# Patient Record
Sex: FEMALE | Race: WHITE | NOT HISPANIC OR LATINO | Employment: OTHER | URBAN - METROPOLITAN AREA
[De-identification: names, ages, dates, MRNs, and addresses within clinical notes are randomized per-mention and may not be internally consistent; named-entity substitution may affect disease eponyms.]

---

## 2022-11-01 ENCOUNTER — HOSPITAL ENCOUNTER (INPATIENT)
Facility: HOSPITAL | Age: 62
LOS: 5 days | Discharge: HOME | End: 2022-11-08
Attending: EMERGENCY MEDICINE | Admitting: INTERNAL MEDICINE

## 2022-11-01 ENCOUNTER — APPOINTMENT (OUTPATIENT)
Dept: CT IMAGING | Facility: HOSPITAL | Age: 62
End: 2022-11-01

## 2022-11-01 DIAGNOSIS — J18.0 BRONCHOPNEUMONIA: ICD-10-CM

## 2022-11-01 DIAGNOSIS — N17.9 ACUTE KIDNEY INJURY: Primary | ICD-10-CM

## 2022-11-01 PROBLEM — I48.91 ATRIAL FIBRILLATION: Status: ACTIVE | Noted: 2022-11-01

## 2022-11-01 PROBLEM — J18.9 PNEUMONIA: Status: ACTIVE | Noted: 2022-11-01

## 2022-11-01 LAB
ALBUMIN SERPL-MCNC: 4.1 G/DL (ref 3.5–5.2)
ALBUMIN/GLOB SERPL: 1.1 G/DL
ALP SERPL-CCNC: 92 U/L (ref 39–117)
ALT SERPL W P-5'-P-CCNC: 12 U/L (ref 1–33)
AMORPH URATE CRY URNS QL MICRO: ABNORMAL /HPF
ANION GAP SERPL CALCULATED.3IONS-SCNC: 21 MMOL/L (ref 5–15)
AST SERPL-CCNC: 21 U/L (ref 1–32)
BACTERIA UR QL AUTO: ABNORMAL /HPF
BASOPHILS # BLD AUTO: 0 10*3/MM3 (ref 0–0.2)
BASOPHILS NFR BLD AUTO: 0.2 % (ref 0–1.5)
BILIRUB SERPL-MCNC: 0.4 MG/DL (ref 0–1.2)
BILIRUB UR QL STRIP: ABNORMAL
BUN SERPL-MCNC: 50 MG/DL (ref 8–23)
BUN/CREAT SERPL: 18.1 (ref 7–25)
CALCIUM SPEC-SCNC: 9 MG/DL (ref 8.6–10.5)
CHLORIDE SERPL-SCNC: 103 MMOL/L (ref 98–107)
CLARITY UR: ABNORMAL
CO2 SERPL-SCNC: 17 MMOL/L (ref 22–29)
COLOR UR: ABNORMAL
CREAT SERPL-MCNC: 2.77 MG/DL (ref 0.57–1)
D DIMER PPP FEU-MCNC: 1.09 MG/L (FEU) (ref 0–0.59)
D-LACTATE SERPL-SCNC: 1.1 MMOL/L (ref 0.5–2)
DEPRECATED RDW RBC AUTO: 43.3 FL (ref 37–54)
EGFRCR SERPLBLD CKD-EPI 2021: 18.6 ML/MIN/1.73
EOSINOPHIL # BLD AUTO: 0.1 10*3/MM3 (ref 0–0.4)
EOSINOPHIL NFR BLD AUTO: 1.2 % (ref 0.3–6.2)
ERYTHROCYTE [DISTWIDTH] IN BLOOD BY AUTOMATED COUNT: 13.5 % (ref 12.3–15.4)
GLOBULIN UR ELPH-MCNC: 3.8 GM/DL
GLUCOSE SERPL-MCNC: 116 MG/DL (ref 65–99)
GLUCOSE UR STRIP-MCNC: NEGATIVE MG/DL
HCT VFR BLD AUTO: 34.3 % (ref 34–46.6)
HGB BLD-MCNC: 11.5 G/DL (ref 12–15.9)
HGB UR QL STRIP.AUTO: NEGATIVE
HOLD SPECIMEN: NORMAL
HYALINE CASTS UR QL AUTO: ABNORMAL /LPF
KETONES UR QL STRIP: ABNORMAL
L PNEUMO1 AG UR QL IA: NEGATIVE
LEUKOCYTE ESTERASE UR QL STRIP.AUTO: ABNORMAL
LIPASE SERPL-CCNC: 42 U/L (ref 13–60)
LYMPHOCYTES # BLD AUTO: 2.7 10*3/MM3 (ref 0.7–3.1)
LYMPHOCYTES NFR BLD AUTO: 31.9 % (ref 19.6–45.3)
MCH RBC QN AUTO: 29.2 PG (ref 26.6–33)
MCHC RBC AUTO-ENTMCNC: 33.6 G/DL (ref 31.5–35.7)
MCV RBC AUTO: 86.7 FL (ref 79–97)
MONOCYTES # BLD AUTO: 0.9 10*3/MM3 (ref 0.1–0.9)
MONOCYTES NFR BLD AUTO: 10.5 % (ref 5–12)
NEUTROPHILS NFR BLD AUTO: 4.7 10*3/MM3 (ref 1.7–7)
NEUTROPHILS NFR BLD AUTO: 56.2 % (ref 42.7–76)
NITRITE UR QL STRIP: NEGATIVE
NRBC BLD AUTO-RTO: 0.1 /100 WBC (ref 0–0.2)
NT-PROBNP SERPL-MCNC: 1217 PG/ML (ref 0–900)
PH UR STRIP.AUTO: <=5 [PH] (ref 5–8)
PLATELET # BLD AUTO: 258 10*3/MM3 (ref 140–450)
PMV BLD AUTO: 7.5 FL (ref 6–12)
POTASSIUM SERPL-SCNC: 3.6 MMOL/L (ref 3.5–5.2)
PROCALCITONIN SERPL-MCNC: 0.17 NG/ML (ref 0–0.25)
PROT SERPL-MCNC: 7.9 G/DL (ref 6–8.5)
PROT UR QL STRIP: ABNORMAL
QT INTERVAL: 442 MS
RBC # BLD AUTO: 3.95 10*6/MM3 (ref 3.77–5.28)
RBC # UR STRIP: ABNORMAL /HPF
REF LAB TEST METHOD: ABNORMAL
S PNEUM AG SPEC QL LA: NEGATIVE
SODIUM SERPL-SCNC: 141 MMOL/L (ref 136–145)
SP GR UR STRIP: 1.02 (ref 1–1.03)
SQUAMOUS #/AREA URNS HPF: ABNORMAL /HPF
TROPONIN T SERPL-MCNC: <0.01 NG/ML (ref 0–0.03)
UROBILINOGEN UR QL STRIP: ABNORMAL
WBC # UR STRIP: ABNORMAL /HPF
WBC NRBC COR # BLD: 8.3 10*3/MM3 (ref 3.4–10.8)

## 2022-11-01 PROCEDURE — 84145 PROCALCITONIN (PCT): CPT | Performed by: EMERGENCY MEDICINE

## 2022-11-01 PROCEDURE — P9612 CATHETERIZE FOR URINE SPEC: HCPCS

## 2022-11-01 PROCEDURE — 81001 URINALYSIS AUTO W/SCOPE: CPT | Performed by: EMERGENCY MEDICINE

## 2022-11-01 PROCEDURE — 94640 AIRWAY INHALATION TREATMENT: CPT

## 2022-11-01 PROCEDURE — 87449 NOS EACH ORGANISM AG IA: CPT | Performed by: PHYSICIAN ASSISTANT

## 2022-11-01 PROCEDURE — 80053 COMPREHEN METABOLIC PANEL: CPT | Performed by: EMERGENCY MEDICINE

## 2022-11-01 PROCEDURE — 25010000002 MORPHINE PER 10 MG: Performed by: INTERNAL MEDICINE

## 2022-11-01 PROCEDURE — G0378 HOSPITAL OBSERVATION PER HR: HCPCS

## 2022-11-01 PROCEDURE — 25010000002 VANCOMYCIN 10 G RECONSTITUTED SOLUTION: Performed by: EMERGENCY MEDICINE

## 2022-11-01 PROCEDURE — 83690 ASSAY OF LIPASE: CPT | Performed by: EMERGENCY MEDICINE

## 2022-11-01 PROCEDURE — 71250 CT THORAX DX C-: CPT

## 2022-11-01 PROCEDURE — 36415 COLL VENOUS BLD VENIPUNCTURE: CPT

## 2022-11-01 PROCEDURE — 83605 ASSAY OF LACTIC ACID: CPT

## 2022-11-01 PROCEDURE — 85025 COMPLETE CBC W/AUTO DIFF WBC: CPT | Performed by: EMERGENCY MEDICINE

## 2022-11-01 PROCEDURE — 87899 AGENT NOS ASSAY W/OPTIC: CPT | Performed by: PHYSICIAN ASSISTANT

## 2022-11-01 PROCEDURE — 83880 ASSAY OF NATRIURETIC PEPTIDE: CPT | Performed by: EMERGENCY MEDICINE

## 2022-11-01 PROCEDURE — 84484 ASSAY OF TROPONIN QUANT: CPT | Performed by: EMERGENCY MEDICINE

## 2022-11-01 PROCEDURE — 87040 BLOOD CULTURE FOR BACTERIA: CPT | Performed by: EMERGENCY MEDICINE

## 2022-11-01 PROCEDURE — 93005 ELECTROCARDIOGRAM TRACING: CPT

## 2022-11-01 PROCEDURE — 99285 EMERGENCY DEPT VISIT HI MDM: CPT

## 2022-11-01 PROCEDURE — 25010000002 ONDANSETRON PER 1 MG: Performed by: PHYSICIAN ASSISTANT

## 2022-11-01 PROCEDURE — 25010000002 FENTANYL CITRATE (PF) 50 MCG/ML SOLUTION: Performed by: EMERGENCY MEDICINE

## 2022-11-01 PROCEDURE — 94799 UNLISTED PULMONARY SVC/PX: CPT

## 2022-11-01 PROCEDURE — 93005 ELECTROCARDIOGRAM TRACING: CPT | Performed by: EMERGENCY MEDICINE

## 2022-11-01 PROCEDURE — 85379 FIBRIN DEGRADATION QUANT: CPT | Performed by: EMERGENCY MEDICINE

## 2022-11-01 PROCEDURE — 25010000002 CEFEPIME PER 500 MG: Performed by: EMERGENCY MEDICINE

## 2022-11-01 RX ORDER — SODIUM CHLORIDE 0.9 % (FLUSH) 0.9 %
10 SYRINGE (ML) INJECTION EVERY 12 HOURS SCHEDULED
Status: DISCONTINUED | OUTPATIENT
Start: 2022-11-01 | End: 2022-11-08 | Stop reason: HOSPADM

## 2022-11-01 RX ORDER — SODIUM CHLORIDE 0.9 % (FLUSH) 0.9 %
10 SYRINGE (ML) INJECTION AS NEEDED
Status: DISCONTINUED | OUTPATIENT
Start: 2022-11-01 | End: 2022-11-08 | Stop reason: HOSPADM

## 2022-11-01 RX ORDER — MAGNESIUM SULFATE 1 G/100ML
1 INJECTION INTRAVENOUS AS NEEDED
Status: DISCONTINUED | OUTPATIENT
Start: 2022-11-01 | End: 2022-11-08 | Stop reason: HOSPADM

## 2022-11-01 RX ORDER — HYDROXYZINE HYDROCHLORIDE 25 MG/1
25 TABLET, FILM COATED ORAL 3 TIMES DAILY PRN
COMMUNITY

## 2022-11-01 RX ORDER — FLUTICASONE PROPIONATE 50 MCG
1 SPRAY, SUSPENSION (ML) NASAL DAILY
COMMUNITY

## 2022-11-01 RX ORDER — POTASSIUM CHLORIDE 1.5 G/1.77G
40 POWDER, FOR SOLUTION ORAL AS NEEDED
Status: DISCONTINUED | OUTPATIENT
Start: 2022-11-01 | End: 2022-11-08 | Stop reason: HOSPADM

## 2022-11-01 RX ORDER — ONDANSETRON 4 MG/1
4 TABLET, FILM COATED ORAL EVERY 6 HOURS PRN
Status: DISCONTINUED | OUTPATIENT
Start: 2022-11-01 | End: 2022-11-08 | Stop reason: HOSPADM

## 2022-11-01 RX ORDER — MULTIPLE VITAMINS W/ MINERALS TAB 9MG-400MCG
1 TAB ORAL DAILY
COMMUNITY

## 2022-11-01 RX ORDER — ACETAMINOPHEN 160 MG/5ML
650 SOLUTION ORAL EVERY 4 HOURS PRN
Status: DISCONTINUED | OUTPATIENT
Start: 2022-11-01 | End: 2022-11-08 | Stop reason: HOSPADM

## 2022-11-01 RX ORDER — LISINOPRIL 20 MG/1
20 TABLET ORAL 2 TIMES DAILY
Status: ON HOLD | COMMUNITY
End: 2022-12-02 | Stop reason: SDUPTHER

## 2022-11-01 RX ORDER — BISACODYL 10 MG
10 SUPPOSITORY, RECTAL RECTAL DAILY PRN
Status: DISCONTINUED | OUTPATIENT
Start: 2022-11-01 | End: 2022-11-08 | Stop reason: HOSPADM

## 2022-11-01 RX ORDER — ACETAMINOPHEN 325 MG/1
650 TABLET ORAL EVERY 4 HOURS PRN
Status: DISCONTINUED | OUTPATIENT
Start: 2022-11-01 | End: 2022-11-08 | Stop reason: HOSPADM

## 2022-11-01 RX ORDER — METOPROLOL TARTRATE 50 MG/1
75 TABLET, FILM COATED ORAL 2 TIMES DAILY
COMMUNITY
End: 2022-11-08 | Stop reason: HOSPADM

## 2022-11-01 RX ORDER — POTASSIUM CHLORIDE 20 MEQ/1
40 TABLET, EXTENDED RELEASE ORAL AS NEEDED
Status: DISCONTINUED | OUTPATIENT
Start: 2022-11-01 | End: 2022-11-08 | Stop reason: HOSPADM

## 2022-11-01 RX ORDER — MAGNESIUM SULFATE HEPTAHYDRATE 40 MG/ML
2 INJECTION, SOLUTION INTRAVENOUS AS NEEDED
Status: DISCONTINUED | OUTPATIENT
Start: 2022-11-01 | End: 2022-11-08 | Stop reason: HOSPADM

## 2022-11-01 RX ORDER — BISACODYL 5 MG/1
5 TABLET, DELAYED RELEASE ORAL DAILY PRN
Status: DISCONTINUED | OUTPATIENT
Start: 2022-11-01 | End: 2022-11-08 | Stop reason: HOSPADM

## 2022-11-01 RX ORDER — SODIUM CHLORIDE 9 MG/ML
125 INJECTION, SOLUTION INTRAVENOUS CONTINUOUS
Status: DISCONTINUED | OUTPATIENT
Start: 2022-11-01 | End: 2022-11-02

## 2022-11-01 RX ORDER — HYDROXYZINE HYDROCHLORIDE 25 MG/1
25 TABLET, FILM COATED ORAL 3 TIMES DAILY PRN
Status: DISCONTINUED | OUTPATIENT
Start: 2022-11-01 | End: 2022-11-08 | Stop reason: HOSPADM

## 2022-11-01 RX ORDER — BUSPIRONE HYDROCHLORIDE 15 MG/1
15 TABLET ORAL 3 TIMES DAILY
Status: DISCONTINUED | OUTPATIENT
Start: 2022-11-01 | End: 2022-11-08 | Stop reason: HOSPADM

## 2022-11-01 RX ORDER — ONDANSETRON 2 MG/ML
4 INJECTION INTRAMUSCULAR; INTRAVENOUS EVERY 6 HOURS PRN
Status: DISCONTINUED | OUTPATIENT
Start: 2022-11-01 | End: 2022-11-08 | Stop reason: HOSPADM

## 2022-11-01 RX ORDER — HYDROCHLOROTHIAZIDE 25 MG/1
25 TABLET ORAL DAILY
COMMUNITY
End: 2022-12-04 | Stop reason: HOSPADM

## 2022-11-01 RX ORDER — SODIUM CHLORIDE 9 MG/ML
75 INJECTION, SOLUTION INTRAVENOUS CONTINUOUS
Status: DISCONTINUED | OUTPATIENT
Start: 2022-11-01 | End: 2022-11-03

## 2022-11-01 RX ORDER — ATORVASTATIN CALCIUM 40 MG/1
40 TABLET, FILM COATED ORAL NIGHTLY
Status: DISCONTINUED | OUTPATIENT
Start: 2022-11-01 | End: 2022-11-08 | Stop reason: HOSPADM

## 2022-11-01 RX ORDER — ACETAMINOPHEN 650 MG/1
650 SUPPOSITORY RECTAL EVERY 4 HOURS PRN
Status: DISCONTINUED | OUTPATIENT
Start: 2022-11-01 | End: 2022-11-08 | Stop reason: HOSPADM

## 2022-11-01 RX ORDER — SUMATRIPTAN 50 MG/1
50 TABLET, FILM COATED ORAL
COMMUNITY

## 2022-11-01 RX ORDER — NITROGLYCERIN 0.4 MG/1
0.4 TABLET SUBLINGUAL
Status: DISCONTINUED | OUTPATIENT
Start: 2022-11-01 | End: 2022-11-08 | Stop reason: HOSPADM

## 2022-11-01 RX ORDER — ESCITALOPRAM OXALATE 20 MG/1
20 TABLET ORAL DAILY
COMMUNITY

## 2022-11-01 RX ORDER — TOPIRAMATE 25 MG/1
50 TABLET ORAL 2 TIMES DAILY
Status: DISCONTINUED | OUTPATIENT
Start: 2022-11-01 | End: 2022-11-08 | Stop reason: HOSPADM

## 2022-11-01 RX ORDER — POLYETHYLENE GLYCOL 3350 17 G/17G
17 POWDER, FOR SOLUTION ORAL DAILY PRN
Status: DISCONTINUED | OUTPATIENT
Start: 2022-11-01 | End: 2022-11-08 | Stop reason: HOSPADM

## 2022-11-01 RX ORDER — IPRATROPIUM BROMIDE AND ALBUTEROL SULFATE 2.5; .5 MG/3ML; MG/3ML
3 SOLUTION RESPIRATORY (INHALATION) EVERY 4 HOURS PRN
Status: DISCONTINUED | OUTPATIENT
Start: 2022-11-01 | End: 2022-11-08 | Stop reason: HOSPADM

## 2022-11-01 RX ORDER — ESCITALOPRAM OXALATE 10 MG/1
20 TABLET ORAL DAILY
Status: DISCONTINUED | OUTPATIENT
Start: 2022-11-02 | End: 2022-11-08 | Stop reason: HOSPADM

## 2022-11-01 RX ORDER — ATORVASTATIN CALCIUM 40 MG/1
40 TABLET, FILM COATED ORAL DAILY
COMMUNITY

## 2022-11-01 RX ORDER — TRAZODONE HYDROCHLORIDE 50 MG/1
50 TABLET ORAL NIGHTLY PRN
COMMUNITY

## 2022-11-01 RX ORDER — CHOLECALCIFEROL (VITAMIN D3) 125 MCG
5 CAPSULE ORAL NIGHTLY PRN
Status: DISCONTINUED | OUTPATIENT
Start: 2022-11-01 | End: 2022-11-08 | Stop reason: HOSPADM

## 2022-11-01 RX ORDER — SUMATRIPTAN 50 MG/1
50 TABLET, FILM COATED ORAL
Status: DISCONTINUED | OUTPATIENT
Start: 2022-11-01 | End: 2022-11-08 | Stop reason: HOSPADM

## 2022-11-01 RX ORDER — ALUMINA, MAGNESIA, AND SIMETHICONE 2400; 2400; 240 MG/30ML; MG/30ML; MG/30ML
15 SUSPENSION ORAL EVERY 6 HOURS PRN
Status: DISCONTINUED | OUTPATIENT
Start: 2022-11-01 | End: 2022-11-08 | Stop reason: HOSPADM

## 2022-11-01 RX ORDER — AMOXICILLIN 250 MG
2 CAPSULE ORAL 2 TIMES DAILY
Status: DISCONTINUED | OUTPATIENT
Start: 2022-11-01 | End: 2022-11-08 | Stop reason: HOSPADM

## 2022-11-01 RX ORDER — FENTANYL CITRATE 50 UG/ML
50 INJECTION, SOLUTION INTRAMUSCULAR; INTRAVENOUS ONCE
Status: COMPLETED | OUTPATIENT
Start: 2022-11-01 | End: 2022-11-01

## 2022-11-01 RX ORDER — BUSPIRONE HYDROCHLORIDE 15 MG/1
15 TABLET ORAL 3 TIMES DAILY
COMMUNITY

## 2022-11-01 RX ORDER — TOPIRAMATE 50 MG/1
50 TABLET, FILM COATED ORAL 2 TIMES DAILY
COMMUNITY

## 2022-11-01 RX ADMIN — ATORVASTATIN CALCIUM 40 MG: 40 TABLET, FILM COATED ORAL at 21:46

## 2022-11-01 RX ADMIN — FENTANYL CITRATE 50 MCG: 50 INJECTION, SOLUTION INTRAMUSCULAR; INTRAVENOUS at 12:10

## 2022-11-01 RX ADMIN — ONDANSETRON 4 MG: 2 INJECTION INTRAMUSCULAR; INTRAVENOUS at 15:12

## 2022-11-01 RX ADMIN — SODIUM CHLORIDE 1000 ML: 9 INJECTION, SOLUTION INTRAVENOUS at 10:25

## 2022-11-01 RX ADMIN — MORPHINE SULFATE 4 MG: 4 INJECTION, SOLUTION INTRAMUSCULAR; INTRAVENOUS at 21:46

## 2022-11-01 RX ADMIN — BUSPIRONE HYDROCHLORIDE 15 MG: 15 TABLET ORAL at 21:46

## 2022-11-01 RX ADMIN — MORPHINE SULFATE 4 MG: 4 INJECTION, SOLUTION INTRAMUSCULAR; INTRAVENOUS at 15:12

## 2022-11-01 RX ADMIN — SODIUM CHLORIDE 75 ML/HR: 9 INJECTION, SOLUTION INTRAVENOUS at 18:04

## 2022-11-01 RX ADMIN — TOPIRAMATE 50 MG: 25 TABLET, FILM COATED ORAL at 21:45

## 2022-11-01 RX ADMIN — Medication 10 ML: at 21:46

## 2022-11-01 RX ADMIN — CEFEPIME 2 G: 2 INJECTION, POWDER, FOR SOLUTION INTRAVENOUS at 10:27

## 2022-11-01 RX ADMIN — SODIUM CHLORIDE, POTASSIUM CHLORIDE, SODIUM LACTATE AND CALCIUM CHLORIDE 1000 ML: 600; 310; 30; 20 INJECTION, SOLUTION INTRAVENOUS at 11:30

## 2022-11-01 RX ADMIN — SENNOSIDES AND DOCUSATE SODIUM 2 TABLET: 50; 8.6 TABLET ORAL at 21:45

## 2022-11-01 RX ADMIN — SODIUM CHLORIDE 125 ML/HR: 9 INJECTION, SOLUTION INTRAVENOUS at 18:42

## 2022-11-01 RX ADMIN — VANCOMYCIN HYDROCHLORIDE 1500 MG: 10 INJECTION, POWDER, LYOPHILIZED, FOR SOLUTION INTRAVENOUS at 10:28

## 2022-11-01 RX ADMIN — SODIUM CHLORIDE 1000 ML: 9 INJECTION, SOLUTION INTRAVENOUS at 18:42

## 2022-11-01 RX ADMIN — IPRATROPIUM BROMIDE AND ALBUTEROL SULFATE 3 ML: .5; 3 SOLUTION RESPIRATORY (INHALATION) at 17:07

## 2022-11-01 RX ADMIN — ACETAMINOPHEN 650 MG: 325 TABLET, FILM COATED ORAL at 17:02

## 2022-11-01 NOTE — ED PROVIDER NOTES
Subjective   History of Present Illness  History Provided By: Patient    Chief Complaint: Cough, shortness of breath going on for 3 days, also has not had any urine output for slightly less than 24 hours.  Onset: 3 days ago  Timing: Worsening  Location: Chest  Quality: Productive cough, shortness of breath  Severity: Moderate  Modifying Factors: None    Other: Found to be very hypotensive in triage.  71/44.  Patient states she has a history of A. fib.  Has been nauseous but no vomiting.  No fever.    Review of Systems   Constitutional: Negative for chills and fever.   Respiratory: Positive for cough, shortness of breath and wheezing.    Cardiovascular: Negative for chest pain.   Gastrointestinal: Negative for abdominal pain, diarrhea and vomiting.   All other systems reviewed and are negative.      Past Medical History:   Diagnosis Date   • Atrial fibrillation (HCC)    • Atrial fibrillation (HCC)    • Depression    • Hypertension    • Migraine        Allergies   Allergen Reactions   • Penicillins Shortness Of Breath       Past Surgical History:   Procedure Laterality Date   • THORACOTOMY Left     for TB       Family History   Problem Relation Age of Onset   • No Known Problems Mother    • No Known Problems Father        Social History     Socioeconomic History   • Marital status:    Tobacco Use   • Smoking status: Never   • Smokeless tobacco: Never   Vaping Use   • Vaping Use: Never used           Objective   Physical Exam  Constitutional:  No acute distress.  Head:  Atraumatic.  Normocephalic.   Eyes:  No scleral icterus. Normal conjunctiva  ENT:  Moist mucosa.  No nasal discharge present.  Cardiovascular:  Well perfused.  Equal pulses.  Regular rhythm and normal rate.  Normal capillary refill.    Pulmonary/Chest: Tachypneic.  Speaking in mildly shortened sentences.  Diminished breath sounds bilaterally.  Abdominal:  Non-distended. Non-tender.   Extremities:  No peripheral edema.  No Deformity  Skin:  Warm,  "dry  Neurological:  Alert, awake, and appropriate.  Normal speech.      Procedures           ED Course      /53   Pulse 64   Temp 98.6 °F (37 °C) (Oral)   Resp 16   Ht 162.6 cm (64\")   Wt 74.8 kg (165 lb)   SpO2 99%   BMI 28.32 kg/m²   Labs Reviewed   COMPREHENSIVE METABOLIC PANEL - Abnormal; Notable for the following components:       Result Value    Glucose 116 (*)     BUN 50 (*)     Creatinine 2.77 (*)     CO2 17.0 (*)     Anion Gap 21.0 (*)     eGFR 18.6 (*)     All other components within normal limits    Narrative:     GFR Normal >60  Chronic Kidney Disease <60  Kidney Failure <15     D-DIMER, QUANTITATIVE - Abnormal; Notable for the following components:    D-Dimer, Quantitative 1.09 (*)     All other components within normal limits    Narrative:     Reference Range  --------------------------------------------------------------------     < 0.50   Negative Predictive Value  0.50-0.59   Indeterminate    >= 0.60   Probable VTE             A very low percentage of patients with DVT may yield D-Dimer results   below the cut-off of 0.50 mg/L FEU.  This is known to be more   prevalent in patients with distal DVT.             Results of this test should always be interpreted in conjunction with   the patient's medical history, clinical presentation and other   findings.  Clinical diagnosis should not be based on the result of   [place] D-Dimer alone.   BNP (IN-HOUSE) - Abnormal; Notable for the following components:    proBNP 1,217.0 (*)     All other components within normal limits    Narrative:     Among patients with dyspnea, NT-proBNP is highly sensitive for the detection of acute congestive heart failure. In addition NT-proBNP of <300 pg/ml effectively rules out acute congestive heart failure with 99% negative predictive value.    Results may be falsely decreased if patient taking Biotin.     CBC WITH AUTO DIFFERENTIAL - Abnormal; Notable for the following components:    Hemoglobin 11.5 (*)     " "All other components within normal limits   URINALYSIS W/ MICROSCOPIC IF INDICATED (NO CULTURE) - Abnormal; Notable for the following components:    Color, UA Dark Yellow (*)     Appearance, UA Cloudy (*)     Ketones, UA Trace (*)     Bilirubin, UA Small (1+) (*)     Protein, UA 30 mg/dL (1+) (*)     Leuk Esterase, UA Trace (*)     All other components within normal limits   URINALYSIS, MICROSCOPIC ONLY - Abnormal; Notable for the following components:    RBC, UA 0-2 (*)     WBC, UA 0-2 (*)     All other components within normal limits   LEGIONELLA ANTIGEN, URINE - Normal   STREP PNEUMO AG, URINE OR CSF - Normal   LIPASE - Normal   TROPONIN (IN-HOUSE) - Normal    Narrative:     Troponin T Reference Range:  <= 0.03 ng/mL-   Negative for AMI  >0.03 ng/mL-     Abnormal for myocardial necrosis.  Clinicians would have to utilize clinical acumen, EKG, Troponin and serial changes to determine if it is an Acute Myocardial Infarction or myocardial injury due to an underlying chronic condition.       Results may be falsely decreased if patient taking Biotin.     PROCALCITONIN - Normal    Narrative:     As a Marker for Sepsis (Non-Neonates):    1. <0.5 ng/mL represents a low risk of severe sepsis and/or septic shock.  2. >2 ng/mL represents a high risk of severe sepsis and/or septic shock.    As a Marker for Lower Respiratory Tract Infections that require antibiotic therapy:    PCT on Admission    Antibiotic Therapy       6-12 Hrs later    >0.5                Strongly Recommended  >0.25 - <0.5        Recommended   0.1 - 0.25          Discouraged              Remeasure/reassess PCT  <0.1                Strongly Discouraged     Remeasure/reassess PCT    As 28 day mortality risk marker: \"Change in Procalcitonin Result\" (>80% or <=80%) if Day 0 (or Day 1) and Day 4 values are available. Refer to http://www.[employer]-pct-calculator.com    Change in PCT <=80%  A decrease of PCT levels below or equal to 80% defines a positive change in " PCT test result representing a higher risk for 28-day all-cause mortality of patients diagnosed with severe sepsis for septic shock.    Change in PCT >80%  A decrease of PCT levels of more than 80% defines a negative change in PCT result representing a lower risk for 28-day all-cause mortality of patients diagnosed with severe sepsis or septic shock.      POC LACTATE - Normal   BLOOD CULTURE   BLOOD CULTURE   RESPIRATORY CULTURE   MRSA SCREEN, PCR   POC LACTATE   CBC AND DIFFERENTIAL    Narrative:     The following orders were created for panel order CBC & Differential.  Procedure                               Abnormality         Status                     ---------                               -----------         ------                     CBC Auto Differential[[id number]]        Abnormal            Final result                 Please view results for these tests on the individual orders.   EXTRA TUBES    Narrative:     The following orders were created for panel order Extra Tubes.  Procedure                               Abnormality         Status                     ---------                               -----------         ------                     Gold Top - SST[[id number]]                                   Final result                 Please view results for these tests on the individual orders.   GOLD TOP - SST     Medications   sodium chloride 0.9 % flush 10 mL (has no administration in time range)   nitroglycerin (NITROSTAT) SL tablet 0.4 mg (has no administration in time range)   sodium chloride 0.9 % flush 10 mL (has no administration in time range)   sodium chloride 0.9 % flush 10 mL (has no administration in time range)   acetaminophen (TYLENOL) tablet 650 mg (650 mg Oral Given 11/1/22 1702)     Or   acetaminophen (TYLENOL) 160 MG/5ML solution 650 mg ( Oral Not Given:  See Alt 11/1/22 1702)     Or   acetaminophen (TYLENOL) suppository 650 mg ( Rectal Not Given:  See Alt 11/1/22 1702)    aluminum-magnesium hydroxide-simethicone (MAALOX MAX) 400-400-40 MG/5ML suspension 15 mL (has no administration in time range)   sennosides-docusate (PERICOLACE) 8.6-50 MG per tablet 2 tablet (has no administration in time range)     And   polyethylene glycol (MIRALAX) packet 17 g (has no administration in time range)     And   bisacodyl (DULCOLAX) EC tablet 5 mg (has no administration in time range)     And   bisacodyl (DULCOLAX) suppository 10 mg (has no administration in time range)   ondansetron (ZOFRAN) tablet 4 mg ( Oral Not Given:  See Alt 11/1/22 1512)     Or   ondansetron (ZOFRAN) injection 4 mg (4 mg Intravenous Given 11/1/22 1512)   melatonin tablet 5 mg (has no administration in time range)   ipratropium-albuterol (DUO-NEB) nebulizer solution 3 mL (3 mL Nebulization Given 11/1/22 1707)   potassium chloride (K-DUR,KLOR-CON) CR tablet 40 mEq (has no administration in time range)   potassium chloride (KLOR-CON) packet 40 mEq (has no administration in time range)   Magnesium Sulfate 2 gram infusion - Mg less than or equal to 1.5 mg/dL (has no administration in time range)     Or   Magnesium Sulfate 1 gram infusion - Mg 1.6-1.9 mg/dL (has no administration in time range)   atorvastatin (LIPITOR) tablet 40 mg (has no administration in time range)   busPIRone (BUSPAR) tablet 15 mg (has no administration in time range)   escitalopram (LEXAPRO) tablet 20 mg (has no administration in time range)   hydrOXYzine (ATARAX) tablet 25 mg (has no administration in time range)   metoprolol tartrate (LOPRESSOR) tablet 75 mg (has no administration in time range)   SUMAtriptan (IMITREX) tablet 50 mg (has no administration in time range)   topiramate (TOPAMAX) tablet 50 mg (has no administration in time range)   morphine injection 4 mg (4 mg Intravenous Given 11/1/22 1512)   sodium chloride 0.9 % infusion (has no administration in time range)   Pharmacy to dose vancomycin (has no administration in time range)   sodium  chloride 0.9 % bolus 1,000 mL (0 mL Intravenous Stopped 11/1/22 1114)   vancomycin 1500 mg/500 mL 0.9% NS IVPB ([facility]) (0 mg Intravenous Stopped 11/1/22 1325)   cefepime 2 gm IVPB in 100 ml NS (MBP) (0 g Intravenous Stopped 11/1/22 1114)   lactated ringers bolus 1,000 mL (0 mL Intravenous Stopped 11/1/22 1321)   fentaNYL citrate (PF) (SUBLIMAZE) injection 50 mcg (50 mcg Intravenous Given 11/1/22 1210)     CT Chest Without Contrast Diagnostic    Result Date: 11/1/2022  1. Patchy groundglass opacities within the bilateral lower lobes, likely representing bronchopneumonia and/or bronchiolitis.    Electronically Signed By-Tee Rao MD On:11/1/2022 12:51 PM This report was finalized on [id number] by  Tee Rao MD.                                         MDM   Critical Care Time     The high probability of sudden, clinically significant deterioration in the patient's condition required the highest level of my preparedness to intervene urgently.  The services I provided to this patient were to treat and/or prevent clinically significant deterioration that could result in: Cardiovascular collapse and death. Services included the following: chart data review, reviewing nurses notes an/or old charts, documentation time, consultant collaboration regarding findings and treatment options, vital sign assessments and ordering, interpreting and reviewing diagnostic studies/lab test.  Aggregate critical care time was 35 minutes, which includes only time during which I was engaged in work directly related to the patient's care, as described above, whether at the bedside or elsewhere in the Emergency Department. It did not include time spent performing other reported procedures or the services of residents, students, nurses or physician assistants.        Patient very hypotensive upon arrival.  Patient with IRENE.  Bronchopneumonia.  Pressure responded quickly to fluids.  Given 2 L and now hypertensive.  Patient  nontoxic-appearing.  Will admit to hospitalist service.  Final diagnoses:   Acute kidney injury (HCC)   Bronchopneumonia       ED Disposition  ED Disposition     ED Disposition   Decision to Admit    Condition   --    Comment   Level of Care: Med/Surg [1]   Admitting Physician: TC IVEY [[id number]]   Attending Physician: TC IVEY [[id number]]               No follow-up provider specified.       Medication List      No changes were made to your prescriptions during this visit.          Wilmer Rodrigues MD  11/01/22 8822

## 2022-11-01 NOTE — CONSULTS
[facility] CONSULT NOTE    Referring Provider: Dr. Blake Walker  Reason for Consultation: Acute kidney injury    Chief complaint.  Shortness of breath, cough    History of present illness:   Patient is 62 years old female with history of hypertension, depression, paroxysmal atrial fibrillation, presented to the hospital with few days history of cough, shortness of breath, thick expectoration, nausea, decreased appetite and oral intake.  Patient did not have any abdominal pain, vomiting, diarrhea, constipation, hematuria or dysuria but had decreased urine output since yesterday.  Patient was hypotensive on presentation so received IV fluid bolus.  Her creatinine was 2.77 Mg/DL which prompted renal consult.  Patient denies any use of NSAIDs.  Patient is on metoprolol, lisinopril and hydrochlorothiazide for blood pressure control as per HPI    History  Past Medical History:   Diagnosis Date   • Atrial fibrillation (HCC)    • Atrial fibrillation (HCC)    • Depression    • Hypertension    • Migraine      Past Surgical History:   Procedure Laterality Date   • THORACOTOMY Left     for TB     Social History     Tobacco Use   • Smoking status: Never   • Smokeless tobacco: Never   Vaping Use   • Vaping Use: Never used     Family History   Problem Relation Age of Onset   • No Known Problems Mother    • No Known Problems Father        Review of Systems  ROS  As per HPI  Objective     Vital Signs  Temp:  [98.6 °F (37 °C)] 98.6 °F (37 °C)  Heart Rate:  [57-93] 64  Resp:  [12-26] 16  BP: ()/(42-99) 131/53    I/O this shift:  In: 2600 [IV Piggyback:2600]  Out: -   No intake/output data recorded.    Physical Exam:  Physical Exam    General Appearance: alert, oriented x 3, no acute distress   Skin: warm and dry  HEENT: oral mucosa dry, nonicteric sclera  Neck: supple, no JVD  Lungs: Few scattered wheezes  Heart: RRR, normal S1 and S2  Abdomen: soft, nontender, nondistended  : no palpable bladder  Extremities: no edema,  cyanosis or clubbing  Neuro: normal speech and mental status     Results Review:   I reviewed the patient's new clinical results.    Lab Results   Component Value Date    CALCIUM 9.0 11/01/2022     Results from last 7 days   Lab Units 11/01/22  1024   SODIUM mmol/L 141   POTASSIUM mmol/L 3.6   CHLORIDE mmol/L 103   CO2 mmol/L 17.0*   BUN mg/dL 50*   CREATININE mg/dL 2.77*   GLUCOSE mg/dL 116*   CALCIUM mg/dL 9.0   WBC 10*3/mm3 8.30   HEMOGLOBIN g/dL 11.5*   PLATELETS 10*3/mm3 258   ALT (SGPT) U/L 12   AST (SGOT) U/L 21     Lab Results   Component Value Date    TROPONINT <0.010 11/01/2022     Estimated Creatinine Clearance: 20.3 mL/min (A) (by C-G formula based on SCr of 2.77 mg/dL (H)).No results found for: URICACID    Brief Urine Lab Results  (Last result in the past 365 days)      Color   Clarity   Blood   Leuk Est   Nitrite   Protein   CREAT   Urine HCG        11/01/22 1040 Dark Yellow  Comment: Result checked   Cloudy  Comment: Result checked   Negative   Trace   Negative   30 mg/dL (1+)                 Prior to Admission medications    Medication Sig Start Date End Date Taking? Authorizing Provider   atorvastatin (LIPITOR) 40 MG tablet Take 1 tablet by mouth Daily.   Yes Danny Cervantes MD   busPIRone (BUSPAR) 15 MG tablet Take 1 tablet by mouth 3 (Three) Times a Day.   Yes Danny Cervantes MD   escitalopram (LEXAPRO) 20 MG tablet Take 1 tablet by mouth Daily.   Yes Danny Cervantes MD   fluticasone (FLONASE) 50 MCG/ACT nasal spray 1 spray into the nostril(s) as directed by provider Daily.   Yes Danny Cervantes MD   hydroCHLOROthiazide (HYDRODIURIL) 25 MG tablet Take 1 tablet by mouth Daily.   Yes Danny Cervantes MD   hydrOXYzine (ATARAX) 25 MG tablet Take 1 tablet by mouth 3 (Three) Times a Day As Needed for Itching.   Yes Danny Cervantes MD   lisinopril (PRINIVIL,ZESTRIL) 20 MG tablet Take 1 tablet by mouth 2 (Two) Times a Day.   Yes Danny Cervantes MD   metoprolol  tartrate (LOPRESSOR) 50 MG tablet Take 1.5 tablets by mouth 2 (Two) Times a Day.   Yes Danny Cervantes MD   multivitamin with minerals tablet tablet Take 1 tablet by mouth Daily.   Yes Danny Cervantes MD   SUMAtriptan (IMITREX) 50 MG tablet Take 1 tablet by mouth Every 2 (Two) Hours As Needed for Migraine. Take one tablet at onset of headache. May repeat dose one time in 2 hours if headache not relieved.   Yes Danny Cervantes MD   topiramate (TOPAMAX) 50 MG tablet Take 1 tablet by mouth 2 (Two) Times a Day.   Yes Danny Cervantes MD   traZODone (DESYREL) 50 MG tablet Take 1-2 tablets by mouth At Night As Needed for Sleep.   Yes Danny Cervantes MD       atorvastatin, 40 mg, Oral, Nightly  busPIRone, 15 mg, Oral, TID  [START ON 11/2/2022] escitalopram, 20 mg, Oral, Daily  [START ON 11/2/2022] metoprolol tartrate, 75 mg, Oral, BID  senna-docusate sodium, 2 tablet, Oral, BID  sodium chloride, 1,000 mL, Intravenous, Once  sodium chloride, 10 mL, Intravenous, Q12H  topiramate, 50 mg, Oral, BID      Pharmacy to dose vancomycin,   sodium chloride, 75 mL/hr, Last Rate: 75 mL/hr (11/01/22 1804)  sodium chloride, 125 mL/hr        Assessment & Plan       1.  Acute kidney injury  Patient had normal creatinine as baseline but increased to 2.77 Mg/DL.  Seems to be prerenal in etiology.  Patient clinically looks dry.  Electrolytes acceptable    2.  History of hypertension  Patient hypotensive on presentation,Secondary to dehydration or SIRS  Patient received IV fluid bolus on presentation.  Antihypertensives on hold    3.  Dyspnea/cough  Probably due to pneumonia.    4.  Metabolic acidosis  Increased anion gap, secondary to IRENE    Recs:  -Repeat IV fluid bolus and continue IV hydration  -Keep off antihypertensives  -Order urine sodium  -Surveillance labs    I discussed the patients findings and my recommendations with patient, family and nursing staff    Christiano Jose MD  11/01/22  18:27 EDT

## 2022-11-01 NOTE — H&P
[facility]      Patient Name: Andressa Marks  : 1960  MRN: [id number]  Primary Care Physician:  Provider, No Known  Date of admission: 2022      Subjective      Chief Complaint: SOA    History of Present Illness: Andressa Marks is a 62 y.o. female with a past medical history to include atrial fibrillation rate control, hypertension, depression, migraines who presented to [facility] on 2022 complaining of 3-day history of shortness of breath and low urine output for 24 hours.  Patient reports she is having a productive cough with brown/bloody sputum for 2 days.  She reports that she is short of breath with coughing.  She denies having any chest pain, nausea, vomiting.  She denies having any abdominal pain, diarrhea or constipation.  She reports decreased urine.  She denies fever and chills.  She denies lightheadedness and syncopal episodes.  She reports there is no provoking or alleviating factors.    Emergency department work-up vital signs upon arrival 71/44 she was given a fluid bolus which blood pressure improved to 150/99.  She was also given vancomycin in ED.  UA negative.  Glucose 116.  BUN 50.  Creatinine 2.77.  CO2 17.0.  Lipase 42.  Troponin <0.010.  D-dimer 1.09.  proBNP 1217.0.  Cultures pending.  Sputum culture pending.  Pro-Honorio 0.17.  Lactate 1.1.  White blood count 8.30.  Hemoglobin 11.5.      Review of Systems   Constitutional: Negative for chills and fever.   HENT: Negative for congestion.    Eyes: Negative.    Cardiovascular: Negative for chest pain and syncope.   Respiratory: Positive for cough, hemoptysis, shortness of breath and sputum production.    Skin: Negative.    Musculoskeletal: Positive for back pain.   Gastrointestinal: Negative for bloating, abdominal pain, constipation, diarrhea, nausea and vomiting.   Genitourinary: Negative.    Neurological: Negative.    Psychiatric/Behavioral: Positive for depression.           Personal History     Past Medical History:   Diagnosis Date   • Atrial fibrillation (HCC)    • Atrial fibrillation (HCC)    • Depression    • Hypertension    • Migraine        Past Surgical History:   Procedure Laterality Date   • THORACOTOMY Left     for TB       Family History: family history includes No Known Problems in her father and mother. Otherwise pertinent FHx was reviewed and not pertinent to current issue.    Social History:  reports that she has never smoked. She has never used smokeless tobacco.    Home Medications:  Prior to Admission Medications     Prescriptions Last Dose Informant Patient Reported? Taking?    atorvastatin (LIPITOR) 40 MG tablet   Yes Yes    Take 1 tablet by mouth Daily.    busPIRone (BUSPAR) 15 MG tablet   Yes Yes    Take 1 tablet by mouth 3 (Three) Times a Day.    escitalopram (LEXAPRO) 20 MG tablet   Yes Yes    Take 1 tablet by mouth Daily.    fluticasone (FLONASE) 50 MCG/ACT nasal spray   Yes Yes    1 spray into the nostril(s) as directed by provider Daily.    hydroCHLOROthiazide (HYDRODIURIL) 25 MG tablet   Yes Yes    Take 1 tablet by mouth Daily.    hydrOXYzine (ATARAX) 25 MG tablet   Yes Yes    Take 1 tablet by mouth 3 (Three) Times a Day As Needed for Itching.    lisinopril (PRINIVIL,ZESTRIL) 20 MG tablet   Yes Yes    Take 1 tablet by mouth 2 (Two) Times a Day.    metoprolol tartrate (LOPRESSOR) 50 MG tablet   Yes Yes    Take 1.5 tablets by mouth 2 (Two) Times a Day.    multivitamin with minerals tablet tablet   Yes Yes    Take 1 tablet by mouth Daily.    SUMAtriptan (IMITREX) 50 MG tablet   Yes Yes    Take 1 tablet by mouth Every 2 (Two) Hours As Needed for Migraine. Take one tablet at onset of headache. May repeat dose one time in 2 hours if headache not relieved.    topiramate (TOPAMAX) 50 MG tablet   Yes Yes    Take 1 tablet by mouth 2 (Two) Times a Day.    traZODone (DESYREL) 50 MG tablet   Yes Yes    Take 1-2 tablets by mouth At Night As Needed for Sleep.             Allergies:  Allergies   Allergen Reactions   • Penicillins Shortness Of Breath       Objective      Vitals:   Temp:  [98.6 °F (37 °C)] 98.6 °F (37 °C)  Heart Rate:  [57-93] 62  Resp:  [12-26] 12  BP: ()/(42-99) 118/46  Flow (L/min):  [2] 2    Physical Exam  Constitutional:       General: She is not in acute distress.     Appearance: She is obese.   HENT:      Head: Normocephalic and atraumatic.   Cardiovascular:      Rate and Rhythm: Normal rate and regular rhythm.      Pulses: Normal pulses.      Heart sounds: Normal heart sounds.   Pulmonary:      Effort: Pulmonary effort is normal.      Breath sounds: Wheezing present.   Abdominal:      General: Bowel sounds are normal.      Palpations: Abdomen is soft.      Tenderness: There is no abdominal tenderness.   Musculoskeletal:         General: Normal range of motion.      Cervical back: Normal range of motion and neck supple.   Skin:     General: Skin is warm and dry.   Neurological:      General: No focal deficit present.      Mental Status: She is alert and oriented to person, place, and time.            Result Review    Result Review:  I have personally reviewed the results from the time of this admission to 11/1/2022 16:55 EDT and agree with these findings:  [x]  Laboratory  [x]  Microbiology  [x]  Radiology  [x]  EKG/Telemetry   [x]  Cardiology/Vascular   []  Pathology  []  Old records  []  Other:        Assessment & Plan        Active Hospital Problems:  Active Hospital Problems    Diagnosis    • **Pneumonia    • Atrial fibrillation (HCC)      Plan:     Pneumonia   -CT chest without Patchy groundglass opacities within the bilateral lower lobes, likely  representing bronchopneumonia and/or bronchiolitis.  -White blood count 8.30  -Lactate 1.1  -Pro-Honorio 0.17  -Blood culture pending  -Sputum culture pending  -Urine antigen strep pneumo and Legionella negative  -Respiratory viral panel negative  -O2 and sats 2 L nasal cannula 100%.  -ABX: Vancomycin in  ED  -Nebs  -D-dimer 1.09  -proBNP 1277.0  -Monitor H&H      IRENE   -Current BUN 50  -Current creatinine 2.77  -Hold nephrotoxic medicines  -UA negative  -Fluids  -Patient with elevated D-dimer, hemoptysis, CTA PE protocol needed once okay with nephrology.  Patient on therapeutic heparin for prophylaxis    A-fib, stable   -Continue metoprolol    Hypertension  -Hold lisinopril and hydrochlorothiazide  -Current /46      Hyperlipidemia  -Continue statin therapy    Migraines  - Continue sumatriptan and Topamax    DVT prophylaxis:  No DVT prophylaxis order currently exists.  Therapeutic heparin has been ordered.  CODE STATUS:    Level Of Support Discussed With: Patient  Code Status (Patient has no pulse and is not breathing): CPR (Attempt to Resuscitate)  Medical Interventions (Patient has pulse or is breathing): Full Support    Admission Status:  I believe this patient meets inpatient status.    I discussed the patient's findings and my recommendations with patient.        Signature: Electronically signed by Kelli Curiel PA-C, 11/01/22, 4:46 PM EDT.

## 2022-11-01 NOTE — CASE MANAGEMENT/SOCIAL WORK
Discharge Planning Assessment   [facility]     Patient Name: Andressa Marks  MRN: [id number]  Today's Date: 11/1/2022    Admit Date: 11/1/2022    Plan: Home with daughter, watch for Oxygen needs and IV ATB   Discharge Needs Assessment     Row Name 11/01/22 1922       Living Environment    People in Home child(arin), adult    Current Living Arrangements home    Primary Care Provided by self    Able to Return to Prior Arrangements yes       Resource/Environmental Concerns    Resource/Environmental Concerns none    Transportation Concerns none       Transition Planning    Patient/Family Anticipates Transition to home with family    Patient/Family Anticipated Services at Transition none    Transportation Anticipated family or friend will provide       Discharge Needs Assessment    Readmission Within the Last 30 Days no previous admission in last 30 days    Equipment Currently Used at Home none    Concerns to be Addressed denies needs/concerns at this time    Anticipated Changes Related to Illness none    Equipment Needed After Discharge none               Discharge Plan     Row Name 11/01/22 1923       Plan    Plan Home with daughter, watch for Oxygen needs and IV ATB    Patient/Family in Agreement with Plan yes    Plan Comments Met with Patient at bedside Lives at home with Daughter PCP is Dr Barragan in [place] (not listed in [email or web address]) and Pharmacy verified, able to afford medications. Denies any needs at this time. D/C Barriers: New oxygen need, IV ATB, Renal consult              Continued Care and Services - Admitted Since 11/1/2022    Coordination has not been started for this encounter.       Expected Discharge Date and Time     Expected Discharge Date Expected Discharge Time    Nov 3, 2022          Demographic Summary     Row Name 11/01/22 1922       General Information    Admission Type observation    Arrived From emergency department    Referral Source admission list    Reason for Consult discharge planning     Preferred Language English               Functional Status     Row Name 11/01/22 1922       Functional Status    Usual Activity Tolerance good    Current Activity Tolerance good       Functional Status, IADL    Medications independent    Meal Preparation independent    Housekeeping independent    Laundry independent    Shopping independent       Mental Status    General Appearance WDL WDL       Mental Status Summary    Recent Changes in Mental Status/Cognitive Functioning no changes              Met with patient at bedside wearing mask and goggles, Spent less than 15 minutes in room at greater than 6 feet distance.           Rosalia Alfred RN

## 2022-11-01 NOTE — PROGRESS NOTES
"Pharmacy Antimicrobial Dosing Service    Subjective:  Andressa Marks is a 62 y.o.female admitted with PNA. Pharmacy has been consulted to dose Vancomycin for possible PNA.    PMH: elevated Scr, afib      Assessment/Plan    1. Day #1 Vancomycin: Pulse dosing d/t renal dysfxn. Vanc 1500mg (20 mg/kg ABW) given in ED. Will initiate pulse dosing at this time due to elevated SCr. Random level 0800 on 11/2. Will plan to re-dose when vancomycin level <20 mcg/mL.    2. Day #1 Cefepime: 2g IV q24h for estCrCl < 30 mL/min.    Will continue to monitor drug levels, renal function, culture and sensitivities, and patient clinical status.       Objective:  Relevant clinical data and objective history reviewed:  162.6 cm (64\")   74.8 kg (165 lb)   Ideal body weight: 54.7 kg (120 lb 9.5 oz)  Adjusted ideal body weight: 62.8 kg (138 lb 5.7 oz)  Body mass index is 28.32 kg/m².        Results from last 7 days   Lab Units 11/01/22  1024   CREATININE mg/dL 2.77*     Estimated Creatinine Clearance: 20.3 mL/min (A) (by C-G formula based on SCr of 2.77 mg/dL (H)).  I/O last 3 completed shifts:  In: 2600 [IV Piggyback:2600]  Out: -     Results from last 7 days   Lab Units 11/01/22  1024   WBC 10*3/mm3 8.30     Temperature    11/01/22 1007   Temp: 98.6 °F (37 °C)     Baseline culture/source/susceptibility:  Microbiology Results (last 10 days)       Procedure Component Value - Date/Time    Legionella Antigen, Urine - Urine, Urine, Clean Catch [[id number]]  (Normal) Collected: 11/01/22 1040    Lab Status: Final result Specimen: Urine, Clean Catch Updated: 11/01/22 1529     LEGIONELLA ANTIGEN, URINE Negative    S. Pneumo Ag Urine or CSF - Urine, Urine, Clean Catch [[id number]]  (Normal) Collected: 11/01/22 1040    Lab Status: Final result Specimen: Urine, Clean Catch Updated: 11/01/22 1529     Strep Pneumo Ag Negative            Anti-Infectives (From admission, onward)      Ordered     Dose/Rate Route Frequency Start Stop    11/01/22 1920  " Vancomycin Pharmacy Intermittent/Pulse Dosing        Ordering Provider: Kelli Curiel PA-C     Does not apply As Needed 11/01/22 1918 11/06/22 1917 11/01/22 1712  Pharmacy to dose vancomycin        Ordering Provider: Kelli Curiel PA-C     Does not apply Continuous PRN 11/01/22 1712 11/06/22 1711 11/01/22 1022  vancomycin 1500 mg/500 mL 0.9% NS IVPB ([facility])        Ordering Provider: Wilmer Rodrigues MD    20 mg/kg × 74.8 kg Intravenous Once 11/01/22 1024 11/01/22 1325    11/01/22 1022  cefepime 2 gm IVPB in 100 ml NS (MBP)        Ordering Provider: Wilmer Rodrigues MD    2 g  over 30 Minutes Intravenous Once 11/01/22 1024 11/01/22 1114            Jenny Nicolas [facility]  11/01/22 19:21 EDT

## 2022-11-01 NOTE — ED NOTES
Nursing report ED to floor  Andressa Marks  62 y.o.  female    HPI:   Chief Complaint   Patient presents with    Shortness of Breath     SOA,, Not eating or drinking, pt reports she has an abscess to right side has been sick for one week not getting any better.         Admitting doctor:   Blake Walker MD    Admitting diagnosis:   The primary encounter diagnosis was Acute kidney injury (HCC). A diagnosis of Bronchopneumonia was also pertinent to this visit.    Code status:   Current Code Status       Date Active Code Status Order ID Comments User Context       11/1/2022 1458 CPR (Attempt to Resuscitate) [id number]  Kelli Curiel, [facility] ED        Question Answer    Code Status (Patient has no pulse and is not breathing) CPR (Attempt to Resuscitate)    Medical Interventions (Patient has pulse or is breathing) Full Support    Level Of Support Discussed With Patient                    Allergies:   Penicillins    Isolation:  No active isolations     Fall Risk:  Fall Risk Assessment was completed, and patient is at high risk for falls.   Predictive Model Details         7 (Low) Factor Value    Calculated 11/1/2022 18:08 Age 62    Risk of Fall Model Musculoskeletal Assessment WDL     Active Peripheral IV Present     Imaging order in this encounter Present     Number of Distinct Medication Classes administered 7     Drug Use Not Asked     Diastolic BP 53     Skin Assessment WDL     Magnesium not on file     Creatinine 2.77 mg/dL     Kashif Scale not on file     Number of administrations of Analgesic Narcotics 2     Peripheral Vascular Assessment WDL     Calcium 9 mg/dL     Financial Class Medicaid     Gastrointestinal Assessment WDL     Cardiac Assessment WDL     Respiratory Rate 16     Albumin 4.1 g/dL     Days after Admission 0.329     Chloride 103 mmol/L     Total Bilirubin 0.4 mg/dL     ALT 12 U/L     Potassium 3.6 mmol/L         Weight:       11/01/22  1007   Weight: 74.8 kg (165 lb)       Intake and  Output    Intake/Output Summary (Last 24 hours) at 11/1/2022 1933  Last data filed at 11/1/2022 1325  Gross per 24 hour   Intake 2600 ml   Output --   Net 2600 ml       Diet:   Dietary Orders (From admission, onward)       Start     Ordered    11/01/22 1457  Diet Regular  Diet Effective Now        Question:  Diet / Texture / Consistency  Answer:  Regular    11/01/22 1458                     Most recent vitals:   Vitals:    11/01/22 1701 11/01/22 1707 11/01/22 1830 11/01/22 1841   BP: 131/53  106/44    BP Location:       Patient Position:       Pulse: 73 64 87 76   Resp:  16     Temp:       TempSrc:       SpO2: 100% 99% 97% 97%   Weight:       Height:           Active LDAs/IV Access:   Lines, Drains & Airways       Active LDAs       Name Placement date Placement time Site Days    Peripheral IV 11/01/22 1020 Right Forearm 11/01/22  1020  Forearm  less than 1    Peripheral IV 11/01/22 1028 Left Forearm 11/01/22  1028  Forearm  less than 1                    Skin Condition:   Skin Assessments (last day)       None             Labs (abnormal labs have a star):   Labs Reviewed   COMPREHENSIVE METABOLIC PANEL - Abnormal; Notable for the following components:       Result Value    Glucose 116 (*)     BUN 50 (*)     Creatinine 2.77 (*)     CO2 17.0 (*)     Anion Gap 21.0 (*)     eGFR 18.6 (*)     All other components within normal limits    Narrative:     GFR Normal >60  Chronic Kidney Disease <60  Kidney Failure <15     D-DIMER, QUANTITATIVE - Abnormal; Notable for the following components:    D-Dimer, Quantitative 1.09 (*)     All other components within normal limits    Narrative:     Reference Range  --------------------------------------------------------------------     < 0.50   Negative Predictive Value  0.50-0.59   Indeterminate    >= 0.60   Probable VTE             A very low percentage of patients with DVT may yield D-Dimer results   below the cut-off of 0.50 mg/L FEU.  This is known to be more   prevalent in  patients with distal DVT.             Results of this test should always be interpreted in conjunction with   the patient's medical history, clinical presentation and other   findings.  Clinical diagnosis should not be based on the result of   [place] D-Dimer alone.   BNP (IN-HOUSE) - Abnormal; Notable for the following components:    proBNP 1,217.0 (*)     All other components within normal limits    Narrative:     Among patients with dyspnea, NT-proBNP is highly sensitive for the detection of acute congestive heart failure. In addition NT-proBNP of <300 pg/ml effectively rules out acute congestive heart failure with 99% negative predictive value.    Results may be falsely decreased if patient taking Biotin.     CBC WITH AUTO DIFFERENTIAL - Abnormal; Notable for the following components:    Hemoglobin 11.5 (*)     All other components within normal limits   URINALYSIS W/ MICROSCOPIC IF INDICATED (NO CULTURE) - Abnormal; Notable for the following components:    Color, UA Dark Yellow (*)     Appearance, UA Cloudy (*)     Ketones, UA Trace (*)     Bilirubin, UA Small (1+) (*)     Protein, UA 30 mg/dL (1+) (*)     Leuk Esterase, UA Trace (*)     All other components within normal limits   URINALYSIS, MICROSCOPIC ONLY - Abnormal; Notable for the following components:    RBC, UA 0-2 (*)     WBC, UA 0-2 (*)     All other components within normal limits   LEGIONELLA ANTIGEN, URINE - Normal   STREP PNEUMO AG, URINE OR CSF - Normal   LIPASE - Normal   TROPONIN (IN-HOUSE) - Normal    Narrative:     Troponin T Reference Range:  <= 0.03 ng/mL-   Negative for AMI  >0.03 ng/mL-     Abnormal for myocardial necrosis.  Clinicians would have to utilize clinical acumen, EKG, Troponin and serial changes to determine if it is an Acute Myocardial Infarction or myocardial injury due to an underlying chronic condition.       Results may be falsely decreased if patient taking Biotin.     PROCALCITONIN - Normal    Narrative:     As a  "Marker for Sepsis (Non-Neonates):    1. <0.5 ng/mL represents a low risk of severe sepsis and/or septic shock.  2. >2 ng/mL represents a high risk of severe sepsis and/or septic shock.    As a Marker for Lower Respiratory Tract Infections that require antibiotic therapy:    PCT on Admission    Antibiotic Therapy       6-12 Hrs later    >0.5                Strongly Recommended  >0.25 - <0.5        Recommended   0.1 - 0.25          Discouraged              Remeasure/reassess PCT  <0.1                Strongly Discouraged     Remeasure/reassess PCT    As 28 day mortality risk marker: \"Change in Procalcitonin Result\" (>80% or <=80%) if Day 0 (or Day 1) and Day 4 values are available. Refer to http://[email or web address]    Change in PCT <=80%  A decrease of PCT levels below or equal to 80% defines a positive change in PCT test result representing a higher risk for 28-day all-cause mortality of patients diagnosed with severe sepsis for septic shock.    Change in PCT >80%  A decrease of PCT levels of more than 80% defines a negative change in PCT result representing a lower risk for 28-day all-cause mortality of patients diagnosed with severe sepsis or septic shock.      POC LACTATE - Normal   BLOOD CULTURE   BLOOD CULTURE   RESPIRATORY CULTURE   MRSA SCREEN, PCR   SODIUM, URINE, RANDOM   POC LACTATE   CBC AND DIFFERENTIAL    Narrative:     The following orders were created for panel order CBC & Differential.  Procedure                               Abnormality         Status                     ---------                               -----------         ------                     CBC Auto Differential[[id number]]        Abnormal            Final result                 Please view results for these tests on the individual orders.   EXTRA TUBES    Narrative:     The following orders were created for panel order Extra Tubes.  Procedure                               Abnormality         Status                   "   ---------                               -----------         ------                     Gold Top - SST[[id number]]                                   Final result                 Please view results for these tests on the individual orders.   GOLD TOP - SST       LOC: Person, Place, Time, and Situation    Telemetry:  Med/Surg    Cardiac Monitoring Ordered: yes    EKG:   ECG 12 Lead Dyspnea   Preliminary Result   HEART RATE= 74  bpm   RR Interval= 808  ms   VT Interval= 135  ms   P Horizontal Axis=   deg   P Front Axis= 66  deg   QRSD Interval= 106  ms   QT Interval= 442  ms   QRS Axis= -15  deg   T Wave Axis= 61  deg   - NORMAL ECG -   Sinus rhythm   Electronically Signed By:    Date and Time of Study: 2022-11-01 10:12:37          Medications Given in the ED:   Medications   sodium chloride 0.9 % flush 10 mL (has no administration in time range)   nitroglycerin (NITROSTAT) SL tablet 0.4 mg (has no administration in time range)   sodium chloride 0.9 % flush 10 mL (has no administration in time range)   sodium chloride 0.9 % flush 10 mL (has no administration in time range)   acetaminophen (TYLENOL) tablet 650 mg (650 mg Oral Given 11/1/22 1702)     Or   acetaminophen (TYLENOL) 160 MG/5ML solution 650 mg ( Oral Not Given:  See Alt 11/1/22 1702)     Or   acetaminophen (TYLENOL) suppository 650 mg ( Rectal Not Given:  See Alt 11/1/22 1702)   aluminum-magnesium hydroxide-simethicone (MAALOX MAX) 400-400-40 MG/5ML suspension 15 mL (has no administration in time range)   sennosides-docusate (PERICOLACE) 8.6-50 MG per tablet 2 tablet (has no administration in time range)     And   polyethylene glycol (MIRALAX) packet 17 g (has no administration in time range)     And   bisacodyl (DULCOLAX) EC tablet 5 mg (has no administration in time range)     And   bisacodyl (DULCOLAX) suppository 10 mg (has no administration in time range)   ondansetron (ZOFRAN) tablet 4 mg ( Oral Not Given:  See Alt 11/1/22 1512)     Or   ondansetron  (ZOFRAN) injection 4 mg (4 mg Intravenous Given 11/1/22 1512)   melatonin tablet 5 mg (has no administration in time range)   ipratropium-albuterol (DUO-NEB) nebulizer solution 3 mL (3 mL Nebulization Given 11/1/22 1707)   potassium chloride (K-DUR,KLOR-CON) CR tablet 40 mEq (has no administration in time range)   potassium chloride (KLOR-CON) packet 40 mEq (has no administration in time range)   Magnesium Sulfate 2 gram infusion - Mg less than or equal to 1.5 mg/dL (has no administration in time range)     Or   Magnesium Sulfate 1 gram infusion - Mg 1.6-1.9 mg/dL (has no administration in time range)   atorvastatin (LIPITOR) tablet 40 mg (has no administration in time range)   busPIRone (BUSPAR) tablet 15 mg (has no administration in time range)   escitalopram (LEXAPRO) tablet 20 mg (has no administration in time range)   hydrOXYzine (ATARAX) tablet 25 mg (has no administration in time range)   metoprolol tartrate (LOPRESSOR) tablet 75 mg (has no administration in time range)   SUMAtriptan (IMITREX) tablet 50 mg (has no administration in time range)   topiramate (TOPAMAX) tablet 50 mg (has no administration in time range)   morphine injection 4 mg (4 mg Intravenous Given 11/1/22 1512)   sodium chloride 0.9 % infusion (75 mL/hr Intravenous New Bag 11/1/22 1804)   Pharmacy to dose vancomycin (has no administration in time range)   sodium chloride 0.9 % bolus 1,000 mL (1,000 mL Intravenous New Bag 11/1/22 1842)   sodium chloride 0.9 % infusion (125 mL/hr Intravenous New Bag 11/1/22 1842)   Vancomycin Pharmacy Intermittent/Pulse Dosing (has no administration in time range)   cefepime 2 gm IVPB in 100 ml NS (MBP) (has no administration in time range)   sodium chloride 0.9 % bolus 1,000 mL (0 mL Intravenous Stopped 11/1/22 1114)   vancomycin 1500 mg/500 mL 0.9% NS IVPB ([facility]) (0 mg Intravenous Stopped 11/1/22 1325)   cefepime 2 gm IVPB in 100 ml NS (MBP) (0 g Intravenous Stopped 11/1/22 1114)   lactated ringers bolus  1,000 mL (0 mL Intravenous Stopped 11/1/22 1321)   fentaNYL citrate (PF) (SUBLIMAZE) injection 50 mcg (50 mcg Intravenous Given 11/1/22 1210)       Imaging results:  CT Chest Without Contrast Diagnostic    Result Date: 11/1/2022  1. Patchy groundglass opacities within the bilateral lower lobes, likely representing bronchopneumonia and/or bronchiolitis.    Electronically Signed By-Tee Rao MD On:11/1/2022 12:51 PM This report was finalized on [id number] by  Tee Rao MD.     Social issues:   Social History     Socioeconomic History    Marital status:    Tobacco Use    Smoking status: Never    Smokeless tobacco: Never   Vaping Use    Vaping Use: Never used       NIH Stroke Scale:  Interval: (not recorded)  1a. Level of Consciousness: (not recorded)  1b. LOC Questions: (not recorded)  1c. LOC Commands: (not recorded)  2. Best Gaze: (not recorded)  3. Visual: (not recorded)  4. Facial Palsy: (not recorded)  5a. Motor Arm, Left: (not recorded)  5b. Motor Arm, Right: (not recorded)  6a. Motor Leg, Left: (not recorded)  6b. Motor Leg, Right: (not recorded)  7. Limb Ataxia: (not recorded)  8. Sensory: (not recorded)  9. Best Language: (not recorded)  10. Dysarthria: (not recorded)  11. Extinction and Inattention (formerly Neglect): (not recorded)    Total (NIH Stroke Scale): (not recorded)     Additional notable assessment information:     Nursing report ED to floor:    Umu Elias RN   11/01/22 19:33 EDT

## 2022-11-02 LAB
ANION GAP SERPL CALCULATED.3IONS-SCNC: 9 MMOL/L (ref 5–15)
B PARAPERT DNA SPEC QL NAA+PROBE: NOT DETECTED
B PERT DNA SPEC QL NAA+PROBE: NOT DETECTED
BASOPHILS # BLD AUTO: 0 10*3/MM3 (ref 0–0.2)
BASOPHILS NFR BLD AUTO: 0.4 % (ref 0–1.5)
BUN SERPL-MCNC: 39 MG/DL (ref 8–23)
BUN/CREAT SERPL: 31.5 (ref 7–25)
C PNEUM DNA NPH QL NAA+NON-PROBE: NOT DETECTED
CALCIUM SPEC-SCNC: 7.9 MG/DL (ref 8.6–10.5)
CHLORIDE SERPL-SCNC: 116 MMOL/L (ref 98–107)
CO2 SERPL-SCNC: 20 MMOL/L (ref 22–29)
CREAT SERPL-MCNC: 1.24 MG/DL (ref 0.57–1)
DEPRECATED RDW RBC AUTO: 44.2 FL (ref 37–54)
EGFRCR SERPLBLD CKD-EPI 2021: 48.7 ML/MIN/1.73
EOSINOPHIL # BLD AUTO: 0.3 10*3/MM3 (ref 0–0.4)
EOSINOPHIL NFR BLD AUTO: 4.5 % (ref 0.3–6.2)
ERYTHROCYTE [DISTWIDTH] IN BLOOD BY AUTOMATED COUNT: 13.7 % (ref 12.3–15.4)
FLUAV SUBTYP SPEC NAA+PROBE: NOT DETECTED
FLUBV RNA ISLT QL NAA+PROBE: NOT DETECTED
GLUCOSE SERPL-MCNC: 111 MG/DL (ref 65–99)
HADV DNA SPEC NAA+PROBE: NOT DETECTED
HCOV 229E RNA SPEC QL NAA+PROBE: NOT DETECTED
HCOV HKU1 RNA SPEC QL NAA+PROBE: NOT DETECTED
HCOV NL63 RNA SPEC QL NAA+PROBE: NOT DETECTED
HCOV OC43 RNA SPEC QL NAA+PROBE: NOT DETECTED
HCT VFR BLD AUTO: 27 % (ref 34–46.6)
HGB BLD-MCNC: 9.1 G/DL (ref 12–15.9)
HMPV RNA NPH QL NAA+NON-PROBE: DETECTED
HPIV1 RNA ISLT QL NAA+PROBE: NOT DETECTED
HPIV2 RNA SPEC QL NAA+PROBE: NOT DETECTED
HPIV3 RNA NPH QL NAA+PROBE: NOT DETECTED
HPIV4 P GENE NPH QL NAA+PROBE: NOT DETECTED
LYMPHOCYTES # BLD AUTO: 1.5 10*3/MM3 (ref 0.7–3.1)
LYMPHOCYTES NFR BLD AUTO: 26.9 % (ref 19.6–45.3)
M PNEUMO IGG SER IA-ACNC: NOT DETECTED
MAGNESIUM SERPL-MCNC: 1.9 MG/DL (ref 1.6–2.4)
MCH RBC QN AUTO: 29.3 PG (ref 26.6–33)
MCHC RBC AUTO-ENTMCNC: 33.7 G/DL (ref 31.5–35.7)
MCV RBC AUTO: 87 FL (ref 79–97)
MONOCYTES # BLD AUTO: 0.5 10*3/MM3 (ref 0.1–0.9)
MONOCYTES NFR BLD AUTO: 9.5 % (ref 5–12)
MRSA DNA SPEC QL NAA+PROBE: NORMAL
NEUTROPHILS NFR BLD AUTO: 3.3 10*3/MM3 (ref 1.7–7)
NEUTROPHILS NFR BLD AUTO: 58.7 % (ref 42.7–76)
NRBC BLD AUTO-RTO: 0.1 /100 WBC (ref 0–0.2)
PLATELET # BLD AUTO: 175 10*3/MM3 (ref 140–450)
PMV BLD AUTO: 7.7 FL (ref 6–12)
POTASSIUM SERPL-SCNC: 4.5 MMOL/L (ref 3.5–5.2)
RBC # BLD AUTO: 3.11 10*6/MM3 (ref 3.77–5.28)
RHINOVIRUS RNA SPEC NAA+PROBE: NOT DETECTED
RSV RNA NPH QL NAA+NON-PROBE: NOT DETECTED
SARS-COV-2 RNA NPH QL NAA+NON-PROBE: NOT DETECTED
SODIUM SERPL-SCNC: 145 MMOL/L (ref 136–145)
SODIUM UR-SCNC: 26 MMOL/L
WBC NRBC COR # BLD: 5.6 10*3/MM3 (ref 3.4–10.8)

## 2022-11-02 PROCEDURE — 94799 UNLISTED PULMONARY SVC/PX: CPT

## 2022-11-02 PROCEDURE — 84300 ASSAY OF URINE SODIUM: CPT | Performed by: INTERNAL MEDICINE

## 2022-11-02 PROCEDURE — 25010000002 HEPARIN (PORCINE) PER 1000 UNITS: Performed by: HOSPITALIST

## 2022-11-02 PROCEDURE — 25010000002 ONDANSETRON PER 1 MG: Performed by: PHYSICIAN ASSISTANT

## 2022-11-02 PROCEDURE — 87070 CULTURE OTHR SPECIMN AEROBIC: CPT | Performed by: PHYSICIAN ASSISTANT

## 2022-11-02 PROCEDURE — 25010000002 MORPHINE PER 10 MG: Performed by: INTERNAL MEDICINE

## 2022-11-02 PROCEDURE — 36415 COLL VENOUS BLD VENIPUNCTURE: CPT | Performed by: PHYSICIAN ASSISTANT

## 2022-11-02 PROCEDURE — G0378 HOSPITAL OBSERVATION PER HR: HCPCS

## 2022-11-02 PROCEDURE — 25010000002 CEFEPIME PER 500 MG: Performed by: HOSPITALIST

## 2022-11-02 PROCEDURE — 0202U NFCT DS 22 TRGT SARS-COV-2: CPT | Performed by: INTERNAL MEDICINE

## 2022-11-02 PROCEDURE — 87641 MR-STAPH DNA AMP PROBE: CPT | Performed by: PHYSICIAN ASSISTANT

## 2022-11-02 PROCEDURE — 85025 COMPLETE CBC W/AUTO DIFF WBC: CPT | Performed by: PHYSICIAN ASSISTANT

## 2022-11-02 PROCEDURE — 80048 BASIC METABOLIC PNL TOTAL CA: CPT | Performed by: HOSPITALIST

## 2022-11-02 PROCEDURE — 83735 ASSAY OF MAGNESIUM: CPT | Performed by: PHYSICIAN ASSISTANT

## 2022-11-02 PROCEDURE — 87205 SMEAR GRAM STAIN: CPT | Performed by: PHYSICIAN ASSISTANT

## 2022-11-02 RX ORDER — GUAIFENESIN 600 MG/1
600 TABLET, EXTENDED RELEASE ORAL EVERY 12 HOURS SCHEDULED
Status: DISCONTINUED | OUTPATIENT
Start: 2022-11-02 | End: 2022-11-02

## 2022-11-02 RX ORDER — GUAIFENESIN/DEXTROMETHORPHAN 100-10MG/5
5 SYRUP ORAL EVERY 4 HOURS PRN
Status: DISCONTINUED | OUTPATIENT
Start: 2022-11-02 | End: 2022-11-08 | Stop reason: HOSPADM

## 2022-11-02 RX ORDER — HYDROCODONE BITARTRATE AND ACETAMINOPHEN 5; 325 MG/1; MG/1
1 TABLET ORAL EVERY 4 HOURS PRN
Status: DISCONTINUED | OUTPATIENT
Start: 2022-11-02 | End: 2022-11-08 | Stop reason: HOSPADM

## 2022-11-02 RX ORDER — HEPARIN SODIUM 5000 [USP'U]/ML
5000 INJECTION, SOLUTION INTRAVENOUS; SUBCUTANEOUS EVERY 8 HOURS SCHEDULED
Status: DISCONTINUED | OUTPATIENT
Start: 2022-11-02 | End: 2022-11-08 | Stop reason: HOSPADM

## 2022-11-02 RX ADMIN — TOPIRAMATE 50 MG: 25 TABLET, FILM COATED ORAL at 20:56

## 2022-11-02 RX ADMIN — ACETAMINOPHEN 650 MG: 325 TABLET, FILM COATED ORAL at 16:09

## 2022-11-02 RX ADMIN — MORPHINE SULFATE 4 MG: 4 INJECTION, SOLUTION INTRAMUSCULAR; INTRAVENOUS at 02:23

## 2022-11-02 RX ADMIN — SENNOSIDES AND DOCUSATE SODIUM 2 TABLET: 50; 8.6 TABLET ORAL at 09:51

## 2022-11-02 RX ADMIN — IPRATROPIUM BROMIDE AND ALBUTEROL SULFATE 3 ML: .5; 3 SOLUTION RESPIRATORY (INHALATION) at 06:50

## 2022-11-02 RX ADMIN — GUAIFENESIN SYRUP AND DEXTROMETHORPHAN 5 ML: 100; 10 SYRUP ORAL at 16:09

## 2022-11-02 RX ADMIN — HYDROCODONE BITARTRATE AND ACETAMINOPHEN 1 TABLET: 5; 325 TABLET ORAL at 22:30

## 2022-11-02 RX ADMIN — Medication 10 ML: at 20:57

## 2022-11-02 RX ADMIN — ATORVASTATIN CALCIUM 40 MG: 40 TABLET, FILM COATED ORAL at 20:56

## 2022-11-02 RX ADMIN — TOPIRAMATE 50 MG: 25 TABLET, FILM COATED ORAL at 09:51

## 2022-11-02 RX ADMIN — METOPROLOL TARTRATE 75 MG: 50 TABLET, FILM COATED ORAL at 20:56

## 2022-11-02 RX ADMIN — SODIUM CHLORIDE 125 ML/HR: 9 INJECTION, SOLUTION INTRAVENOUS at 05:41

## 2022-11-02 RX ADMIN — Medication 10 ML: at 09:50

## 2022-11-02 RX ADMIN — BUSPIRONE HYDROCHLORIDE 15 MG: 15 TABLET ORAL at 09:51

## 2022-11-02 RX ADMIN — ONDANSETRON 4 MG: 2 INJECTION INTRAMUSCULAR; INTRAVENOUS at 02:23

## 2022-11-02 RX ADMIN — CEFEPIME 2 G: 2 INJECTION, POWDER, FOR SOLUTION INTRAVENOUS at 22:30

## 2022-11-02 RX ADMIN — METOPROLOL TARTRATE 75 MG: 50 TABLET, FILM COATED ORAL at 09:51

## 2022-11-02 RX ADMIN — CEFEPIME 2 G: 2 INJECTION, POWDER, FOR SOLUTION INTRAVENOUS at 09:54

## 2022-11-02 RX ADMIN — HEPARIN SODIUM 5000 UNITS: 5000 INJECTION INTRAVENOUS; SUBCUTANEOUS at 21:00

## 2022-11-02 RX ADMIN — ESCITALOPRAM OXALATE 20 MG: 10 TABLET ORAL at 09:51

## 2022-11-02 RX ADMIN — SENNOSIDES AND DOCUSATE SODIUM 2 TABLET: 50; 8.6 TABLET ORAL at 20:56

## 2022-11-02 RX ADMIN — IPRATROPIUM BROMIDE AND ALBUTEROL SULFATE 3 ML: .5; 3 SOLUTION RESPIRATORY (INHALATION) at 18:57

## 2022-11-02 RX ADMIN — GUAIFENESIN SYRUP AND DEXTROMETHORPHAN 5 ML: 100; 10 SYRUP ORAL at 20:56

## 2022-11-02 RX ADMIN — BUSPIRONE HYDROCHLORIDE 15 MG: 15 TABLET ORAL at 20:56

## 2022-11-02 RX ADMIN — HYDROCODONE BITARTRATE AND ACETAMINOPHEN 1 TABLET: 5; 325 TABLET ORAL at 18:07

## 2022-11-02 NOTE — PROGRESS NOTES
[facility] Medicine Services Daily Progress Note    Patient Name: Andressa Marks  : 1960  MRN: [id number]  Primary Care Physician:  Provider, No Known  Date of admission: 2022      Subjective      Patient reports that she is feeling better this morning.  Renal function has significantly improved.  Nephrology has seen the patient and signed off.  We will keep for another 24 hours to monitor her respiratory status.  Hopefully discharge tomorrow.    Objective      Vitals:   Temp:  [97.9 °F (36.6 °C)-98 °F (36.7 °C)] 97.9 °F (36.6 °C)  Heart Rate:  [56-87] 57  Resp:  [16-23] 16  BP: (106-134)/(44-70) 134/70  Flow (L/min):  [1-2] 1    PHYSICAL EXAM:     Constitutional:       Appearance: Normal appearance.   HENT:      Head: Normocephalic and atraumatic.   Eyes:      Extraocular Movements: Extraocular movements intact.      Pupils: Pupils are equal, round, and reactive to light.   Cardiovascular:      Rate and Rhythm: Normal rate and regular rhythm.      Pulses: Normal pulses.      Heart sounds: Normal heart sounds.   Pulmonary:     Lungs are clear to auscultation bilaterally; no wheezes appreciated  Abdominal:      General: Abdomen is flat. Bowel sounds are normal. There is no distension.   Musculoskeletal:      Cervical back: Normal range of motion and neck supple.      Right lower leg: No edema.      Left lower leg: No edema.   Skin:     General: Skin is warm.   Neurological:      General: No focal deficit present.      Mental Status: He is alert and oriented to person, place, and time.   Psychiatric:         Mood and Affect: Mood normal.         Behavior: Behavior normal.         Assessment & Plan    Pneumonia   -CT chest without Patchy groundglass opacities within the bilateral lower lobes, likely  -Respiratory viral panel positive for human metapneumovirus  - We will continue antibiotics for now but will likely discontinue on discharge  - Wean O2      IRENE, improved   - Trend  BMP  - IV fluids  - Was seen by nephrology who subsequently signed off     A-fib, stable   -Continue metoprolol     Hypertension   Hyperlipidemia  Migraines  - Continue  home meds    DVT prophylaxis:  Medical DVT prophylaxis orders are present.    CODE STATUS:    Level Of Support Discussed With: Patient  Code Status (Patient has no pulse and is not breathing): CPR (Attempt to Resuscitate)  Medical Interventions (Patient has pulse or is breathing): Full Support      Expected length of stay: TBD    Electronically signed by Uche Siegel MD, 11/02/22, 14:46 EDT.  [facility] Hospitalist Team

## 2022-11-02 NOTE — PROGRESS NOTES
"[facility] PROGRESS NOTE     LOS: 0 days    Patient Care Team:  Provider, No Known as PCP - General      Subjective     Patient seen and examined this morning.  Patient has cough and wheezing  Urine output started to improve    Objective     Vital Sign Min/Max for last 24 hours  Temp:  [98 °F (36.7 °C)] 98 °F (36.7 °C)  Heart Rate:  [56-93] 71  Resp:  [12-23] 23  BP: ()/(42-99) 108/53                       Flowsheet Rows    Flowsheet Row First Filed Value   Admission Height 162.6 cm (64\") Documented at 11/01/2022 1007   Admission Weight 74.8 kg (165 lb) Documented at 11/01/2022 1007          I/O this shift:  In: 360 [P.O.:360]  Out: -   I/O last 3 completed shifts:  In: 4972.9 [I.V.:1372.9; IV Piggyback:3600]  Out: 0     Physical Exam:  Physical Exam    General Appearance: alert, oriented x 3, no acute distress   Skin: warm and dry  HEENT: oral mucosa dry, nonicteric sclera  Neck: supple, no JVD  Lungs: Bilateral wheezing  Heart: RRR, normal S1 and S2  Abdomen: soft, nontender, nondistended  : no palpable bladder  Extremities: no edema, cyanosis or clubbing  Neuro: normal speech and mental status        LABS:  Lab Results   Component Value Date    CALCIUM 7.9 (L) 11/02/2022     Results from last 7 days   Lab Units 11/02/22  0808 11/01/22  1024   MAGNESIUM mg/dL 1.9  --    SODIUM mmol/L 145 141   POTASSIUM mmol/L 4.5 3.6   CHLORIDE mmol/L 116* 103   CO2 mmol/L 20.0* 17.0*   BUN mg/dL 39* 50*   CREATININE mg/dL 1.24* 2.77*   GLUCOSE mg/dL 111* 116*   CALCIUM mg/dL 7.9* 9.0   WBC 10*3/mm3 5.60 8.30   HEMOGLOBIN g/dL 9.1* 11.5*   PLATELETS 10*3/mm3 175 258   ALT (SGPT) U/L  --  12   AST (SGOT) U/L  --  21     Lab Results   Component Value Date    TROPONINT <0.010 11/01/2022     Estimated Creatinine Clearance: 46.6 mL/min (A) (by C-G formula based on SCr of 1.24 mg/dL (H)).      Brief Urine Lab Results  (Last result in the past 365 days)      Color   Clarity   Blood   Leuk Est   Nitrite   Protein   CREAT   " Urine HCG        11/01/22 1040 Dark Yellow  Comment: Result checked   Cloudy  Comment: Result checked   Negative   Trace   Negative   30 mg/dL (1+)               WEIGHTS:     Wt Readings from Last 1 Encounters:   11/02/22 0435 79 kg (174 lb 2.6 oz)   11/01/22 1007 74.8 kg (165 lb)       atorvastatin, 40 mg, Oral, Nightly  busPIRone, 15 mg, Oral, TID  cefepime, 2 g, Intravenous, Q12H  escitalopram, 20 mg, Oral, Daily  guaiFENesin, 600 mg, Oral, Q12H  metoprolol tartrate, 75 mg, Oral, BID  senna-docusate sodium, 2 tablet, Oral, BID  sodium chloride, 10 mL, Intravenous, Q12H  topiramate, 50 mg, Oral, BID      sodium chloride, 75 mL/hr, Last Rate: 75 mL/hr (11/01/22 1804)        Assessment & Plan       1.  Acute kidney injury  Nonoliguric ATN due to dehydration and hypotension  Renal function improving with IV hydration.  Creatinine 1.2 Mg/DL this morning     2.  History of hypertension  Patient hypotensive on presentation,Secondary to dehydration or SIRS  Blood pressure still low but improving  Antihypertensives on hold     3.  Dyspnea/cough  Probably due to pneumonia.     4.  Metabolic acidosis  Increased anion gap, secondary to IRENE  Improving with IV hydration     Recs:  -Continue gentle IV hydration  -Keep off antihypertensives  -I will sign off.  Please call for any question    Christiano Jose MD  11/02/22  10:26 EDT

## 2022-11-03 ENCOUNTER — APPOINTMENT (OUTPATIENT)
Dept: CT IMAGING | Facility: HOSPITAL | Age: 62
End: 2022-11-03

## 2022-11-03 PROBLEM — N17.9 ACUTE KIDNEY INJURY: Status: ACTIVE | Noted: 2022-11-03

## 2022-11-03 LAB
ALBUMIN SERPL-MCNC: 3.2 G/DL (ref 3.5–5.2)
ANION GAP SERPL CALCULATED.3IONS-SCNC: 9 MMOL/L (ref 5–15)
BUN SERPL-MCNC: 32 MG/DL (ref 8–23)
BUN/CREAT SERPL: 28.3 (ref 7–25)
CALCIUM SPEC-SCNC: 8 MG/DL (ref 8.6–10.5)
CHLORIDE SERPL-SCNC: 114 MMOL/L (ref 98–107)
CO2 SERPL-SCNC: 19 MMOL/L (ref 22–29)
CREAT SERPL-MCNC: 1.13 MG/DL (ref 0.57–1)
EGFRCR SERPLBLD CKD-EPI 2021: 54.4 ML/MIN/1.73
GLUCOSE SERPL-MCNC: 105 MG/DL (ref 65–99)
MAGNESIUM SERPL-MCNC: 1.7 MG/DL (ref 1.6–2.4)
PHOSPHATE SERPL-MCNC: 2.1 MG/DL (ref 2.5–4.5)
PHOSPHATE SERPL-MCNC: 2.1 MG/DL (ref 2.5–4.5)
POTASSIUM SERPL-SCNC: 4.2 MMOL/L (ref 3.5–5.2)
SODIUM SERPL-SCNC: 142 MMOL/L (ref 136–145)

## 2022-11-03 PROCEDURE — 25010000002 HEPARIN (PORCINE) PER 1000 UNITS: Performed by: HOSPITALIST

## 2022-11-03 PROCEDURE — 94799 UNLISTED PULMONARY SVC/PX: CPT

## 2022-11-03 PROCEDURE — 25010000002 CEFEPIME PER 500 MG: Performed by: HOSPITALIST

## 2022-11-03 PROCEDURE — 0 IOPAMIDOL PER 1 ML: Performed by: HOSPITALIST

## 2022-11-03 PROCEDURE — 80069 RENAL FUNCTION PANEL: CPT | Performed by: INTERNAL MEDICINE

## 2022-11-03 PROCEDURE — 93010 ELECTROCARDIOGRAM REPORT: CPT | Performed by: INTERNAL MEDICINE

## 2022-11-03 PROCEDURE — 84100 ASSAY OF PHOSPHORUS: CPT | Performed by: HOSPITALIST

## 2022-11-03 PROCEDURE — 93005 ELECTROCARDIOGRAM TRACING: CPT | Performed by: HOSPITALIST

## 2022-11-03 PROCEDURE — 36415 COLL VENOUS BLD VENIPUNCTURE: CPT | Performed by: PHYSICIAN ASSISTANT

## 2022-11-03 PROCEDURE — 83735 ASSAY OF MAGNESIUM: CPT | Performed by: PHYSICIAN ASSISTANT

## 2022-11-03 PROCEDURE — 71275 CT ANGIOGRAPHY CHEST: CPT

## 2022-11-03 PROCEDURE — 25010000002 ONDANSETRON PER 1 MG: Performed by: PHYSICIAN ASSISTANT

## 2022-11-03 RX ORDER — HYDROCHLOROTHIAZIDE 25 MG/1
25 TABLET ORAL DAILY
Status: DISCONTINUED | OUTPATIENT
Start: 2022-11-03 | End: 2022-11-08 | Stop reason: HOSPADM

## 2022-11-03 RX ADMIN — HYDROCODONE BITARTRATE AND ACETAMINOPHEN 1 TABLET: 5; 325 TABLET ORAL at 21:03

## 2022-11-03 RX ADMIN — ATORVASTATIN CALCIUM 40 MG: 40 TABLET, FILM COATED ORAL at 21:03

## 2022-11-03 RX ADMIN — GUAIFENESIN SYRUP AND DEXTROMETHORPHAN 5 ML: 100; 10 SYRUP ORAL at 05:12

## 2022-11-03 RX ADMIN — METOPROLOL TARTRATE 75 MG: 50 TABLET, FILM COATED ORAL at 09:04

## 2022-11-03 RX ADMIN — HEPARIN SODIUM 5000 UNITS: 5000 INJECTION INTRAVENOUS; SUBCUTANEOUS at 21:03

## 2022-11-03 RX ADMIN — Medication 10 ML: at 09:03

## 2022-11-03 RX ADMIN — IOPAMIDOL 100 ML: 755 INJECTION, SOLUTION INTRAVENOUS at 12:29

## 2022-11-03 RX ADMIN — BUSPIRONE HYDROCHLORIDE 15 MG: 15 TABLET ORAL at 21:03

## 2022-11-03 RX ADMIN — CEFEPIME 2 G: 2 INJECTION, POWDER, FOR SOLUTION INTRAVENOUS at 10:55

## 2022-11-03 RX ADMIN — HEPARIN SODIUM 5000 UNITS: 5000 INJECTION INTRAVENOUS; SUBCUTANEOUS at 15:17

## 2022-11-03 RX ADMIN — TOPIRAMATE 50 MG: 25 TABLET, FILM COATED ORAL at 09:02

## 2022-11-03 RX ADMIN — IPRATROPIUM BROMIDE AND ALBUTEROL SULFATE 3 ML: .5; 3 SOLUTION RESPIRATORY (INHALATION) at 21:36

## 2022-11-03 RX ADMIN — IPRATROPIUM BROMIDE AND ALBUTEROL SULFATE 3 ML: .5; 3 SOLUTION RESPIRATORY (INHALATION) at 04:59

## 2022-11-03 RX ADMIN — HYDROCHLOROTHIAZIDE 25 MG: 25 TABLET ORAL at 09:03

## 2022-11-03 RX ADMIN — HEPARIN SODIUM 5000 UNITS: 5000 INJECTION INTRAVENOUS; SUBCUTANEOUS at 05:12

## 2022-11-03 RX ADMIN — GUAIFENESIN SYRUP AND DEXTROMETHORPHAN 5 ML: 100; 10 SYRUP ORAL at 21:52

## 2022-11-03 RX ADMIN — SUMATRIPTAN SUCCINATE 50 MG: 50 TABLET ORAL at 15:57

## 2022-11-03 RX ADMIN — ESCITALOPRAM OXALATE 20 MG: 10 TABLET ORAL at 09:02

## 2022-11-03 RX ADMIN — ACETAMINOPHEN 650 MG: 325 TABLET, FILM COATED ORAL at 21:04

## 2022-11-03 RX ADMIN — ONDANSETRON 4 MG: 2 INJECTION INTRAMUSCULAR; INTRAVENOUS at 21:03

## 2022-11-03 RX ADMIN — METOPROLOL TARTRATE 75 MG: 50 TABLET, FILM COATED ORAL at 21:03

## 2022-11-03 RX ADMIN — BUSPIRONE HYDROCHLORIDE 15 MG: 15 TABLET ORAL at 15:17

## 2022-11-03 RX ADMIN — BUSPIRONE HYDROCHLORIDE 15 MG: 15 TABLET ORAL at 09:02

## 2022-11-03 RX ADMIN — CEFEPIME 2 G: 2 INJECTION, POWDER, FOR SOLUTION INTRAVENOUS at 21:04

## 2022-11-03 RX ADMIN — HYDROCODONE BITARTRATE AND ACETAMINOPHEN 1 TABLET: 5; 325 TABLET ORAL at 13:55

## 2022-11-03 RX ADMIN — HYDROCODONE BITARTRATE AND ACETAMINOPHEN 1 TABLET: 5; 325 TABLET ORAL at 09:12

## 2022-11-03 RX ADMIN — TOPIRAMATE 50 MG: 25 TABLET, FILM COATED ORAL at 21:03

## 2022-11-03 RX ADMIN — HYDROCODONE BITARTRATE AND ACETAMINOPHEN 1 TABLET: 5; 325 TABLET ORAL at 05:12

## 2022-11-03 RX ADMIN — Medication 10 ML: at 21:03

## 2022-11-03 NOTE — PROGRESS NOTES
[facility] Medicine Services Daily Progress Note    Patient Name: Andressa Marks  : 1960  MRN: [id number]  Primary Care Physician:  Provider, No Known  Date of admission: 2022      Subjective      Patient's renal function continues to improve and blood pressure is now elevated.  She reports that she is feeling worse this morning from a respiratory perspective.  CT angiography of the chest ordered now that her renal function is appropriate.  Showing some edema and small bilateral pleural effusions.  I discontinued her fluids and have asked pulmonary to review the case.    Objective      Vitals:   Temp:  [98 °F (36.7 °C)-98.9 °F (37.2 °C)] 98.9 °F (37.2 °C)  Heart Rate:  [66-82] 72  Resp:  [18-20] 20  BP: (134-181)/(68-76) 175/76  Flow (L/min):  [0-2] 0    PHYSICAL EXAM:     Constitutional:       Appearance: Appears unwell but not toxic  HENT:      Head: Normocephalic and atraumatic.   Eyes:      Extraocular Movements: Extraocular movements intact.      Pupils: Pupils are equal, round, and reactive to light.   Cardiovascular:      Rate and Rhythm: Normal rate and regular rhythm.      Pulses: Normal pulses.      Heart sounds: Normal heart sounds.   Pulmonary:     Lungs are clear to auscultation bilaterally; no wheezes appreciated  Abdominal:      General: Abdomen is flat. Bowel sounds are normal. There is no distension.   Musculoskeletal:      Cervical back: Normal range of motion and neck supple.      Right lower leg: No edema.      Left lower leg: No edema.   Skin:     General: Skin is warm.   Neurological:      General: No focal deficit present.      Mental Status: He is alert and oriented to person, place, and time.   Psychiatric:         Mood and Affect: Mood normal.         Behavior: Behavior normal.         Assessment & Plan    Pneumonia, multifocal  Human metapneumovirus   - Initial CT chest without contrast showing patchy groundglass opacities within the bilateral lower  lobes  - Respiratory viral panel positive for human metapneumovirus  - Repeat CT angiography of the chest showing edema and small bilateral pleural effusions  - Pulmonary consulted  - Holding IV fluids for now and may benefit from some diuresis and/oral steroids     IRENE, improved   - Trend BMP  - Was seen by nephrology who subsequently signed off     A-fib, stable   -Continue metoprolol     Hypertension   Hyperlipidemia  Migraines  - Continue  home meds    DVT prophylaxis:  Medical DVT prophylaxis orders are present.    CODE STATUS:    Level Of Support Discussed With: Patient  Code Status (Patient has no pulse and is not breathing): CPR (Attempt to Resuscitate)  Medical Interventions (Patient has pulse or is breathing): Full Support      Expected length of stay: TBD    Electronically signed by Uche Siegel MD, 11/03/22, 13:38 EDT.  [facility] Hospitalist Team

## 2022-11-03 NOTE — PLAN OF CARE
Goal Outcome Evaluation:  Plan of Care Reviewed With: patient, daughter        Progress: no change  Outcome Evaluation: Pt c/o feeling worse today, c/o headache and overall weakness. Pt receiving IV abx. Currently on RA, vital signs stable. Will continue to monitor.

## 2022-11-04 ENCOUNTER — APPOINTMENT (OUTPATIENT)
Dept: GENERAL RADIOLOGY | Facility: HOSPITAL | Age: 62
End: 2022-11-04

## 2022-11-04 LAB
ANION GAP SERPL CALCULATED.3IONS-SCNC: 13 MMOL/L (ref 5–15)
BACTERIA SPEC RESP CULT: NORMAL
BUN SERPL-MCNC: 18 MG/DL (ref 8–23)
BUN/CREAT SERPL: 18.2 (ref 7–25)
CALCIUM SPEC-SCNC: 8.6 MG/DL (ref 8.6–10.5)
CHLORIDE SERPL-SCNC: 107 MMOL/L (ref 98–107)
CO2 SERPL-SCNC: 20 MMOL/L (ref 22–29)
CREAT SERPL-MCNC: 0.99 MG/DL (ref 0.57–1)
CRP SERPL-MCNC: 14.25 MG/DL (ref 0–0.5)
EGFRCR SERPLBLD CKD-EPI 2021: 63.8 ML/MIN/1.73
GLUCOSE SERPL-MCNC: 117 MG/DL (ref 65–99)
GRAM STN SPEC: NORMAL
MAGNESIUM SERPL-MCNC: 1.7 MG/DL (ref 1.6–2.4)
PHOSPHATE SERPL-MCNC: 2.4 MG/DL (ref 2.5–4.5)
POTASSIUM SERPL-SCNC: 4.4 MMOL/L (ref 3.5–5.2)
PROCALCITONIN SERPL-MCNC: 0.08 NG/ML (ref 0–0.25)
SODIUM SERPL-SCNC: 140 MMOL/L (ref 136–145)
WHOLE BLOOD HOLD SPECIMEN: NORMAL
WHOLE BLOOD HOLD SPECIMEN: NORMAL

## 2022-11-04 PROCEDURE — 94664 DEMO&/EVAL PT USE INHALER: CPT

## 2022-11-04 PROCEDURE — 86140 C-REACTIVE PROTEIN: CPT | Performed by: HOSPITALIST

## 2022-11-04 PROCEDURE — 94799 UNLISTED PULMONARY SVC/PX: CPT

## 2022-11-04 PROCEDURE — 36415 COLL VENOUS BLD VENIPUNCTURE: CPT | Performed by: HOSPITALIST

## 2022-11-04 PROCEDURE — 83735 ASSAY OF MAGNESIUM: CPT | Performed by: PHYSICIAN ASSISTANT

## 2022-11-04 PROCEDURE — 25010000002 CEFEPIME PER 500 MG: Performed by: HOSPITALIST

## 2022-11-04 PROCEDURE — 84145 PROCALCITONIN (PCT): CPT | Performed by: HOSPITALIST

## 2022-11-04 PROCEDURE — 84100 ASSAY OF PHOSPHORUS: CPT | Performed by: HOSPITALIST

## 2022-11-04 PROCEDURE — 80048 BASIC METABOLIC PNL TOTAL CA: CPT | Performed by: HOSPITALIST

## 2022-11-04 PROCEDURE — 71046 X-RAY EXAM CHEST 2 VIEWS: CPT

## 2022-11-04 PROCEDURE — 25010000002 HEPARIN (PORCINE) PER 1000 UNITS: Performed by: HOSPITALIST

## 2022-11-04 RX ORDER — HEPARIN SODIUM 5000 [USP'U]/ML
5000 INJECTION, SOLUTION INTRAVENOUS; SUBCUTANEOUS EVERY 8 HOURS SCHEDULED
Status: DISCONTINUED | OUTPATIENT
Start: 2022-11-04 | End: 2022-11-04 | Stop reason: SDUPTHER

## 2022-11-04 RX ORDER — BUDESONIDE 0.5 MG/2ML
0.5 INHALANT ORAL
Status: DISCONTINUED | OUTPATIENT
Start: 2022-11-04 | End: 2022-11-05

## 2022-11-04 RX ORDER — IPRATROPIUM BROMIDE AND ALBUTEROL SULFATE 2.5; .5 MG/3ML; MG/3ML
3 SOLUTION RESPIRATORY (INHALATION)
Status: DISCONTINUED | OUTPATIENT
Start: 2022-11-04 | End: 2022-11-05

## 2022-11-04 RX ADMIN — METOPROLOL TARTRATE 75 MG: 50 TABLET, FILM COATED ORAL at 09:11

## 2022-11-04 RX ADMIN — CEFEPIME 2 G: 2 INJECTION, POWDER, FOR SOLUTION INTRAVENOUS at 09:11

## 2022-11-04 RX ADMIN — Medication 10 ML: at 21:13

## 2022-11-04 RX ADMIN — SENNOSIDES AND DOCUSATE SODIUM 2 TABLET: 50; 8.6 TABLET ORAL at 09:11

## 2022-11-04 RX ADMIN — DOXYCYCLINE 100 MG: 100 INJECTION, POWDER, LYOPHILIZED, FOR SOLUTION INTRAVENOUS at 23:39

## 2022-11-04 RX ADMIN — HYDROCHLOROTHIAZIDE 25 MG: 25 TABLET ORAL at 09:11

## 2022-11-04 RX ADMIN — ATORVASTATIN CALCIUM 40 MG: 40 TABLET, FILM COATED ORAL at 20:50

## 2022-11-04 RX ADMIN — HEPARIN SODIUM 5000 UNITS: 5000 INJECTION INTRAVENOUS; SUBCUTANEOUS at 21:04

## 2022-11-04 RX ADMIN — TOPIRAMATE 50 MG: 25 TABLET, FILM COATED ORAL at 09:11

## 2022-11-04 RX ADMIN — HYDROCODONE BITARTRATE AND ACETAMINOPHEN 1 TABLET: 5; 325 TABLET ORAL at 10:26

## 2022-11-04 RX ADMIN — CEFEPIME 2 G: 2 INJECTION, POWDER, FOR SOLUTION INTRAVENOUS at 20:50

## 2022-11-04 RX ADMIN — BUSPIRONE HYDROCHLORIDE 15 MG: 15 TABLET ORAL at 16:35

## 2022-11-04 RX ADMIN — IPRATROPIUM BROMIDE AND ALBUTEROL SULFATE 3 ML: .5; 3 SOLUTION RESPIRATORY (INHALATION) at 07:23

## 2022-11-04 RX ADMIN — TOPIRAMATE 50 MG: 25 TABLET, FILM COATED ORAL at 20:49

## 2022-11-04 RX ADMIN — BUSPIRONE HYDROCHLORIDE 15 MG: 15 TABLET ORAL at 09:11

## 2022-11-04 RX ADMIN — ESCITALOPRAM OXALATE 20 MG: 10 TABLET ORAL at 09:11

## 2022-11-04 RX ADMIN — BUSPIRONE HYDROCHLORIDE 15 MG: 15 TABLET ORAL at 20:49

## 2022-11-04 RX ADMIN — Medication 10 ML: at 09:12

## 2022-11-04 RX ADMIN — HEPARIN SODIUM 5000 UNITS: 5000 INJECTION INTRAVENOUS; SUBCUTANEOUS at 16:35

## 2022-11-04 RX ADMIN — HEPARIN SODIUM 5000 UNITS: 5000 INJECTION INTRAVENOUS; SUBCUTANEOUS at 05:55

## 2022-11-04 RX ADMIN — HYDROCODONE BITARTRATE AND ACETAMINOPHEN 1 TABLET: 5; 325 TABLET ORAL at 05:55

## 2022-11-04 RX ADMIN — METOPROLOL TARTRATE 75 MG: 50 TABLET, FILM COATED ORAL at 20:49

## 2022-11-04 RX ADMIN — HYDROCODONE BITARTRATE AND ACETAMINOPHEN 1 TABLET: 5; 325 TABLET ORAL at 21:03

## 2022-11-04 RX ADMIN — ACETAMINOPHEN 650 MG: 325 TABLET, FILM COATED ORAL at 09:15

## 2022-11-04 RX ADMIN — HYDROCODONE BITARTRATE AND ACETAMINOPHEN 1 TABLET: 5; 325 TABLET ORAL at 16:35

## 2022-11-04 NOTE — PAYOR COMM NOTE
"PA FORM WITH CLINICALS FOR INPATIENT PRECERT:                                AUTHORIZATION PENDING:   PLEASE CALL OR FAX DETERMINATION TO CONTACT BELOW. THANK YOU.        Dinah Raymond RN MSN  /UR  [facility]  [phone] office  [phone] fax  [email or web address]    [facility]  NPI: [id number]  Tax: [phone]              Angela Marks (62 y.o. Female)     Date of Birth   1960    Social Security Number       Address   [address] [place] [address]    Home Phone   [phone]    MRN   [id number]       D[place]ze   [place]    Marital Status                               Admission Date   11/1/22    Admission Type   Emergency    Admitting Provider   Blake Walker MD    Attending Provider   Uche Siegel MD    Department, Room/Bed   [facility] 3[facility] MEDICAL INPATIENT, 361/1       Discharge Date       Discharge Disposition       Discharge Destination                               Attending Provider: Uche Siegel MD    Allergies: Penicillins    Isolation: Contact   Infection: Human Metapneumovirus  (11/02/22)   Code Status: CPR    Ht: 162.6 cm (64\")   Wt: 80 kg (176 lb 5.9 oz)    Admission Cmt: None   Principal Problem: Pneumonia [J18.9]                 Active Insurance as of 11/1/2022     Primary Coverage     Payor Plan Insurance Group Employer/Plan Group    [facility] BY TABBY [facility] BY TABBY [id number]     Payor Plan Address Payor Plan Phone Number Payor Plan Fax Number Effective Dates    [address] [address]   1/1/2021 - None Entered    [facility] [phone]       Subscriber Name Subscriber Birth Date Member ID       ANGELA MARKS 1960 [id number]                 Emergency Contacts      (Rel.) Home Phone Work Phone Mobile Phone    ZI LINDSEY JO (Daughter) -- -- [phone]        11/03/22 1556  Inpatient Admission  Once     Completed     Level of Care: Med/Surg    Diagnosis: Acute " kidney injury (HCC) [394163]    Admitting Physician: BLAKE IVEY [[id number]]    Attending Physician: AYDEN ROCHA [[id number]]    Certification: I Certify That Inpatient Hospital Services Are Medically Necessary For Greater Than 2 Midnights        22 1555         22 1325  Initiate Observation Status  Once     Completed     Level of Care: Med/Surg    Diagnosis: Pneumonia [965712]                    History & Physical      O'Kelli Lainez PA-C at 22 1608     Attestation signed by Blake Ivey MD at 22 0926        Electronically signed by Blake Ivey MD, 22, 9:26 AM EDT.  I have reviewed this documentation and agree.                      [facility]      Patient Name: Andressa Marks  : 1960  MRN: [id number]  Primary Care Physician:  Provider, No Known  Date of admission: 2022      Subjective       Chief Complaint: SOA    History of Present Illness: Andressa Marks is a 62 y.o. female with a past medical history to include atrial fibrillation rate control, hypertension, depression, migraines who presented to [facility] on 2022 complaining of 3-day history of shortness of breath and low urine output for 24 hours.  Patient reports she is having a productive cough with brown/bloody sputum for 2 days.  She reports that she is short of breath with coughing.  She denies having any chest pain, nausea, vomiting.  She denies having any abdominal pain, diarrhea or constipation.  She reports decreased urine.  She denies fever and chills.  She denies lightheadedness and syncopal episodes.  She reports there is no provoking or alleviating factors.    Emergency department work-up vital signs upon arrival 71/44 she was given a fluid bolus which blood pressure improved to 150/99.  She was also given vancomycin in ED.  UA negative.  Glucose 116.  BUN 50.  Creatinine 2.77.  CO2 17.0.  Lipase 42.  Troponin <0.010.  D-dimer 1.09.  proBNP  1217.0.  Cultures pending.  Sputum culture pending.  Pro-Honorio 0.17.  Lactate 1.1.  White blood count 8.30.  Hemoglobin 11.5.      Review of Systems   Constitutional: Negative for chills and fever.   HENT: Negative for congestion.    Eyes: Negative.    Cardiovascular: Negative for chest pain and syncope.   Respiratory: Positive for cough, hemoptysis, shortness of breath and sputum production.    Skin: Negative.    Musculoskeletal: Positive for back pain.   Gastrointestinal: Negative for bloating, abdominal pain, constipation, diarrhea, nausea and vomiting.   Genitourinary: Negative.    Neurological: Negative.    Psychiatric/Behavioral: Positive for depression.          Personal History     Past Medical History:   Diagnosis Date   • Atrial fibrillation (HCC)    • Atrial fibrillation (HCC)    • Depression    • Hypertension    • Migraine        Past Surgical History:   Procedure Laterality Date   • THORACOTOMY Left     for TB       Family History: family history includes No Known Problems in her father and mother. Otherwise pertinent FHx was reviewed and not pertinent to current issue.    Social History:  reports that she has never smoked. She has never used smokeless tobacco.    Home Medications:  Prior to Admission Medications     Prescriptions Last Dose Informant Patient Reported? Taking?    atorvastatin (LIPITOR) 40 MG tablet   Yes Yes    Take 1 tablet by mouth Daily.    busPIRone (BUSPAR) 15 MG tablet   Yes Yes    Take 1 tablet by mouth 3 (Three) Times a Day.    escitalopram (LEXAPRO) 20 MG tablet   Yes Yes    Take 1 tablet by mouth Daily.    fluticasone (FLONASE) 50 MCG/ACT nasal spray   Yes Yes    1 spray into the nostril(s) as directed by provider Daily.    hydroCHLOROthiazide (HYDRODIURIL) 25 MG tablet   Yes Yes    Take 1 tablet by mouth Daily.    hydrOXYzine (ATARAX) 25 MG tablet   Yes Yes    Take 1 tablet by mouth 3 (Three) Times a Day As Needed for Itching.    lisinopril (PRINIVIL,ZESTRIL) 20 MG tablet   Yes  Yes    Take 1 tablet by mouth 2 (Two) Times a Day.    metoprolol tartrate (LOPRESSOR) 50 MG tablet   Yes Yes    Take 1.5 tablets by mouth 2 (Two) Times a Day.    multivitamin with minerals tablet tablet   Yes Yes    Take 1 tablet by mouth Daily.    SUMAtriptan (IMITREX) 50 MG tablet   Yes Yes    Take 1 tablet by mouth Every 2 (Two) Hours As Needed for Migraine. Take one tablet at onset of headache. May repeat dose one time in 2 hours if headache not relieved.    topiramate (TOPAMAX) 50 MG tablet   Yes Yes    Take 1 tablet by mouth 2 (Two) Times a Day.    traZODone (DESYREL) 50 MG tablet   Yes Yes    Take 1-2 tablets by mouth At Night As Needed for Sleep.            Allergies:  Allergies   Allergen Reactions   • Penicillins Shortness Of Breath       Objective       Vitals:   Temp:  [98.6 °F (37 °C)] 98.6 °F (37 °C)  Heart Rate:  [57-93] 62  Resp:  [12-26] 12  BP: ()/(42-99) 118/46  Flow (L/min):  [2] 2    Physical Exam  Constitutional:       General: She is not in acute distress.     Appearance: She is obese.   HENT:      Head: Normocephalic and atraumatic.   Cardiovascular:      Rate and Rhythm: Normal rate and regular rhythm.      Pulses: Normal pulses.      Heart sounds: Normal heart sounds.   Pulmonary:      Effort: Pulmonary effort is normal.      Breath sounds: Wheezing present.   Abdominal:      General: Bowel sounds are normal.      Palpations: Abdomen is soft.      Tenderness: There is no abdominal tenderness.   Musculoskeletal:         General: Normal range of motion.      Cervical back: Normal range of motion and neck supple.   Skin:     General: Skin is warm and dry.   Neurological:      General: No focal deficit present.      Mental Status: She is alert and oriented to person, place, and time.            Result Review    Result Review:  I have personally reviewed the results from the time of this admission to 11/1/2022 16:55 EDT and agree with these findings:  [x]  Laboratory  [x]   Microbiology  [x]  Radiology  [x]  EKG/Telemetry   [x]  Cardiology/Vascular   []  Pathology  []  Old records  []  Other:        Assessment & Plan        Active Hospital Problems:  Active Hospital Problems    Diagnosis    • **Pneumonia    • Atrial fibrillation (HCC)      Plan:     Pneumonia   -CT chest without Patchy groundglass opacities within the bilateral lower lobes, likely  representing bronchopneumonia and/or bronchiolitis.  -White blood count 8.30  -Lactate 1.1  -Pro-Honorio 0.17  -Blood culture pending  -Sputum culture pending  -Urine antigen strep pneumo and Legionella negative  -Respiratory viral panel negative  -O2 and sats 2 L nasal cannula 100%.  -ABX: Vancomycin in ED  -Nebs  -D-dimer 1.09  -proBNP 1277.0  -Monitor H&H      IRENE   -Current BUN 50  -Current creatinine 2.77  -Hold nephrotoxic medicines  -UA negative  -Fluids  -Patient with elevated D-dimer, hemoptysis, CTA PE protocol needed once okay with nephrology.  Patient on therapeutic heparin for prophylaxis    A-fib, stable   -Continue metoprolol    Hypertension  -Hold lisinopril and hydrochlorothiazide  -Current /46      Hyperlipidemia  -Continue statin therapy    Migraines  - Continue sumatriptan and Topamax    DVT prophylaxis:  No DVT prophylaxis order currently exists.  Therapeutic heparin has been ordered.  CODE STATUS:    Level Of Support Discussed With: Patient  Code Status (Patient has no pulse and is not breathing): CPR (Attempt to Resuscitate)  Medical Interventions (Patient has pulse or is breathing): Full Support    Admission Status:  I believe this patient meets inpatient status.    I discussed the patient's findings and my recommendations with patient.        Signature: Electronically signed by Kelli Curiel PA-C, 11/01/22, 4:46 PM EDT.    Electronically signed by Blake Walker MD at 11/02/22 0900          Emergency Department Notes      Wilmer Rodrigues MD at 11/01/22 1028          Subjective   History of Present  Illness  History Provided By: Patient    Chief Complaint: Cough, shortness of breath going on for 3 days, also has not had any urine output for slightly less than 24 hours.  Onset: 3 days ago  Timing: Worsening  Location: Chest  Quality: Productive cough, shortness of breath  Severity: Moderate  Modifying Factors: None    Other: Found to be very hypotensive in triage.  71/44.  Patient states she has a history of A. fib.  Has been nauseous but no vomiting.  No fever.    Review of Systems   Constitutional: Negative for chills and fever.   Respiratory: Positive for cough, shortness of breath and wheezing.    Cardiovascular: Negative for chest pain.   Gastrointestinal: Negative for abdominal pain, diarrhea and vomiting.   All other systems reviewed and are negative.      Past Medical History:   Diagnosis Date   • Atrial fibrillation (HCC)    • Atrial fibrillation (HCC)    • Depression    • Hypertension    • Migraine        Allergies   Allergen Reactions   • Penicillins Shortness Of Breath       Past Surgical History:   Procedure Laterality Date   • THORACOTOMY Left     for TB       Family History   Problem Relation Age of Onset   • No Known Problems Mother    • No Known Problems Father        Social History     Socioeconomic History   • Marital status:    Tobacco Use   • Smoking status: Never   • Smokeless tobacco: Never   Vaping Use   • Vaping Use: Never used           Objective   Physical Exam  Constitutional:  No acute distress.  Head:  Atraumatic.  Normocephalic.   Eyes:  No scleral icterus. Normal conjunctiva  ENT:  Moist mucosa.  No nasal discharge present.  Cardiovascular:  Well perfused.  Equal pulses.  Regular rhythm and normal rate.  Normal capillary refill.    Pulmonary/Chest: Tachypneic.  Speaking in mildly shortened sentences.  Diminished breath sounds bilaterally.  Abdominal:  Non-distended. Non-tender.   Extremities:  No peripheral edema.  No Deformity  Skin:  Warm, dry  Neurological:  Alert,  "awake, and appropriate.  Normal speech.      Procedures          ED Course      /53   Pulse 64   Temp 98.6 °F (37 °C) (Oral)   Resp 16   Ht 162.6 cm (64\")   Wt 74.8 kg (165 lb)   SpO2 99%   BMI 28.32 kg/m²   Labs Reviewed   COMPREHENSIVE METABOLIC PANEL - Abnormal; Notable for the following components:       Result Value    Glucose 116 (*)     BUN 50 (*)     Creatinine 2.77 (*)     CO2 17.0 (*)     Anion Gap 21.0 (*)     eGFR 18.6 (*)     All other components within normal limits    Narrative:     GFR Normal >60  Chronic Kidney Disease <60  Kidney Failure <15     D-DIMER, QUANTITATIVE - Abnormal; Notable for the following components:    D-Dimer, Quantitative 1.09 (*)     All other components within normal limits    Narrative:     Reference Range  --------------------------------------------------------------------     < 0.50   Negative Predictive Value  0.50-0.59   Indeterminate    >= 0.60   Probable VTE             A very low percentage of patients with DVT may yield D-Dimer results   below the cut-off of 0.50 mg/L FEU.  This is known to be more   prevalent in patients with distal DVT.             Results of this test should always be interpreted in conjunction with   the patient's medical history, clinical presentation and other   findings.  Clinical diagnosis should not be based on the result of   INNOVANCE D-Dimer alone.   BNP (IN-HOUSE) - Abnormal; Notable for the following components:    proBNP 1,217.0 (*)     All other components within normal limits    Narrative:     Among patients with dyspnea, NT-proBNP is highly sensitive for the detection of acute congestive heart failure. In addition NT-proBNP of <300 pg/ml effectively rules out acute congestive heart failure with 99% negative predictive value.    Results may be falsely decreased if patient taking Biotin.     CBC WITH AUTO DIFFERENTIAL - Abnormal; Notable for the following components:    Hemoglobin 11.5 (*)     All other components within " "normal limits   URINALYSIS W/ MICROSCOPIC IF INDICATED (NO CULTURE) - Abnormal; Notable for the following components:    Color, UA Dark Yellow (*)     Appearance, UA Cloudy (*)     Ketones, UA Trace (*)     Bilirubin, UA Small (1+) (*)     Protein, UA 30 mg/dL (1+) (*)     Leuk Esterase, UA Trace (*)     All other components within normal limits   URINALYSIS, MICROSCOPIC ONLY - Abnormal; Notable for the following components:    RBC, UA 0-2 (*)     WBC, UA 0-2 (*)     All other components within normal limits   LEGIONELLA ANTIGEN, URINE - Normal   STREP PNEUMO AG, URINE OR CSF - Normal   LIPASE - Normal   TROPONIN (IN-HOUSE) - Normal    Narrative:     Troponin T Reference Range:  <= 0.03 ng/mL-   Negative for AMI  >0.03 ng/mL-     Abnormal for myocardial necrosis.  Clinicians would have to utilize clinical acumen, EKG, Troponin and serial changes to determine if it is an Acute Myocardial Infarction or myocardial injury due to an underlying chronic condition.       Results may be falsely decreased if patient taking Biotin.     PROCALCITONIN - Normal    Narrative:     As a Marker for Sepsis (Non-Neonates):    1. <0.5 ng/mL represents a low risk of severe sepsis and/or septic shock.  2. >2 ng/mL represents a high risk of severe sepsis and/or septic shock.    As a Marker for Lower Respiratory Tract Infections that require antibiotic therapy:    PCT on Admission    Antibiotic Therapy       6-12 Hrs later    >0.5                Strongly Recommended  >0.25 - <0.5        Recommended   0.1 - 0.25          Discouraged              Remeasure/reassess PCT  <0.1                Strongly Discouraged     Remeasure/reassess PCT    As 28 day mortality risk marker: \"Change in Procalcitonin Result\" (>80% or <=80%) if Day 0 (or Day 1) and Day 4 values are available. Refer to http://www.Livesets-pct-calculator.com    Change in PCT <=80%  A decrease of PCT levels below or equal to 80% defines a positive change in PCT test result representing " a higher risk for 28-day all-cause mortality of patients diagnosed with severe sepsis for septic shock.    Change in PCT >80%  A decrease of PCT levels of more than 80% defines a negative change in PCT result representing a lower risk for 28-day all-cause mortality of patients diagnosed with severe sepsis or septic shock.      POC LACTATE - Normal   BLOOD CULTURE   BLOOD CULTURE   RESPIRATORY CULTURE   MRSA SCREEN, PCR   POC LACTATE   CBC AND DIFFERENTIAL    Narrative:     The following orders were created for panel order CBC & Differential.  Procedure                               Abnormality         Status                     ---------                               -----------         ------                     CBC Auto Differential[[id number]]        Abnormal            Final result                 Please view results for these tests on the individual orders.   EXTRA TUBES    Narrative:     The following orders were created for panel order Extra Tubes.  Procedure                               Abnormality         Status                     ---------                               -----------         ------                     Gold Top - SST[[id number]]                                   Final result                 Please view results for these tests on the individual orders.   GOLD TOP - SST     Medications   sodium chloride 0.9 % flush 10 mL (has no administration in time range)   nitroglycerin (NITROSTAT) SL tablet 0.4 mg (has no administration in time range)   sodium chloride 0.9 % flush 10 mL (has no administration in time range)   sodium chloride 0.9 % flush 10 mL (has no administration in time range)   acetaminophen (TYLENOL) tablet 650 mg (650 mg Oral Given 11/1/22 1702)     Or   acetaminophen (TYLENOL) 160 MG/5ML solution 650 mg ( Oral Not Given:  See Alt 11/1/22 1702)     Or   acetaminophen (TYLENOL) suppository 650 mg ( Rectal Not Given:  See Alt 11/1/22 1702)   aluminum-magnesium hydroxide-simethicone  (MAALOX MAX) 400-400-40 MG/5ML suspension 15 mL (has no administration in time range)   sennosides-docusate (PERICOLACE) 8.6-50 MG per tablet 2 tablet (has no administration in time range)     And   polyethylene glycol (MIRALAX) packet 17 g (has no administration in time range)     And   bisacodyl (DULCOLAX) EC tablet 5 mg (has no administration in time range)     And   bisacodyl (DULCOLAX) suppository 10 mg (has no administration in time range)   ondansetron (ZOFRAN) tablet 4 mg ( Oral Not Given:  See Alt 11/1/22 1512)     Or   ondansetron (ZOFRAN) injection 4 mg (4 mg Intravenous Given 11/1/22 1512)   melatonin tablet 5 mg (has no administration in time range)   ipratropium-albuterol (DUO-NEB) nebulizer solution 3 mL (3 mL Nebulization Given 11/1/22 1707)   potassium chloride (K-DUR,KLOR-CON) CR tablet 40 mEq (has no administration in time range)   potassium chloride (KLOR-CON) packet 40 mEq (has no administration in time range)   Magnesium Sulfate 2 gram infusion - Mg less than or equal to 1.5 mg/dL (has no administration in time range)     Or   Magnesium Sulfate 1 gram infusion - Mg 1.6-1.9 mg/dL (has no administration in time range)   atorvastatin (LIPITOR) tablet 40 mg (has no administration in time range)   busPIRone (BUSPAR) tablet 15 mg (has no administration in time range)   escitalopram (LEXAPRO) tablet 20 mg (has no administration in time range)   hydrOXYzine (ATARAX) tablet 25 mg (has no administration in time range)   metoprolol tartrate (LOPRESSOR) tablet 75 mg (has no administration in time range)   SUMAtriptan (IMITREX) tablet 50 mg (has no administration in time range)   topiramate (TOPAMAX) tablet 50 mg (has no administration in time range)   morphine injection 4 mg (4 mg Intravenous Given 11/1/22 1512)   sodium chloride 0.9 % infusion (has no administration in time range)   Pharmacy to dose vancomycin (has no administration in time range)   sodium chloride 0.9 % bolus 1,000 mL (0 mL Intravenous  Stopped 11/1/22 1114)   vancomycin 1500 mg/500 mL 0.9% NS IVPB ([facility]) (0 mg Intravenous Stopped 11/1/22 1325)   cefepime 2 gm IVPB in 100 ml NS (MBP) (0 g Intravenous Stopped 11/1/22 1114)   lactated ringers bolus 1,000 mL (0 mL Intravenous Stopped 11/1/22 1321)   fentaNYL citrate (PF) (SUBLIMAZE) injection 50 mcg (50 mcg Intravenous Given 11/1/22 1210)     CT Chest Without Contrast Diagnostic    Result Date: 11/1/2022  1. Patchy groundglass opacities within the bilateral lower lobes, likely representing bronchopneumonia and/or bronchiolitis.    Electronically Signed By-Tee Rao MD On:11/1/2022 12:51 PM This report was finalized on [id number] by  Tee Rao MD.                                         MDM   Critical Care Time     The high probability of sudden, clinically significant deterioration in the patient's condition required the highest level of my preparedness to intervene urgently.  The services I provided to this patient were to treat and/or prevent clinically significant deterioration that could result in: Cardiovascular collapse and death. Services included the following: chart data review, reviewing nurses notes an/or old charts, documentation time, consultant collaboration regarding findings and treatment options, vital sign assessments and ordering, interpreting and reviewing diagnostic studies/lab test.  Aggregate critical care time was 35 minutes, which includes only time during which I was engaged in work directly related to the patient's care, as described above, whether at the bedside or elsewhere in the Emergency Department. It did not include time spent performing other reported procedures or the services of residents, students, nurses or physician assistants.        Patient very hypotensive upon arrival.  Patient with IRENE.  Bronchopneumonia.  Pressure responded quickly to fluids.  Given 2 L and now hypertensive.  Patient nontoxic-appearing.  Will admit to hospitalist  service.  Final diagnoses:   Acute kidney injury (HCC)   Bronchopneumonia       ED Disposition  ED Disposition     ED Disposition   Decision to Admit    Condition   --    Comment   Level of Care: Med/Surg [1]   Admitting Physician: BLAKE IVEY [[id number]]   Attending Physician: BLAKE IVEY [[id number]]               No follow-up provider specified.       Medication List      No changes were made to your prescriptions during this visit.          Wilmer Rodrigues MD  11/01/22 1727      Electronically signed by Wilmer Rodrigues MD at 11/01/22 1727     Martha Melton, RN at 11/01/22 1045        Pt     Electronically signed by Martha Melton, RN at 11/01/22 1053     Umu Elias, RN at 11/01/22 1932          Nursing report ED to floor  [facility]  62 y.o.  female    HPI:   Chief Complaint   Patient presents with   • Shortness of Breath     SOA,, Not eating or drinking, pt reports she has an abscess to right side has been sick for one week not getting any better.         Admitting doctor:   Blake Ivey MD    Admitting diagnosis:   The primary encounter diagnosis was Acute kidney injury (HCC). A diagnosis of Bronchopneumonia was also pertinent to this visit.    Code status:   Current Code Status       Date Active Code Status Order ID Comments User Context       11/1/2022 1458 CPR (Attempt to Resuscitate) [id number]  Kelli Curiel, BOB ED        Question Answer    Code Status (Patient has no pulse and is not breathing) CPR (Attempt to Resuscitate)    Medical Interventions (Patient has pulse or is breathing) Full Support    Level Of Support Discussed With Patient                    Allergies:   Penicillins    Isolation:  No active isolations     Fall Risk:  Fall Risk Assessment was completed, and patient is at high risk for falls.   Predictive Model Details         7 (Low) Factor Value    Calculated 11/1/2022 18:08 Age 62    Risk of Fall Model Musculoskeletal Assessment WDL     Active Peripheral IV Present      Imaging order in this encounter Present     Number of Distinct Medication Classes administered 7     Drug Use Not Asked     Diastolic BP 53     Skin Assessment WDL     Magnesium not on file     Creatinine 2.77 mg/dL     Kashif Scale not on file     Number of administrations of Analgesic Narcotics 2     Peripheral Vascular Assessment WDL     Calcium 9 mg/dL     Financial Class Medicaid     Gastrointestinal Assessment WDL     Cardiac Assessment WDL     Respiratory Rate 16     Albumin 4.1 g/dL     Days after Admission 0.329     Chloride 103 mmol/L     Total Bilirubin 0.4 mg/dL     ALT 12 U/L     Potassium 3.6 mmol/L         Weight:       11/01/22  1007   Weight: 74.8 kg (165 lb)       Intake and Output    Intake/Output Summary (Last 24 hours) at 11/1/2022 1933  Last data filed at 11/1/2022 1325  Gross per 24 hour   Intake 2600 ml   Output --   Net 2600 ml       Diet:   Dietary Orders (From admission, onward)       Start     Ordered    11/01/22 1457  Diet Regular  Diet Effective Now        Question:  Diet / Texture / Consistency  Answer:  Regular    11/01/22 1458                     Most recent vitals:   Vitals:    11/01/22 1701 11/01/22 1707 11/01/22 1830 11/01/22 1841   BP: 131/53  106/44    BP Location:       Patient Position:       Pulse: 73 64 87 76   Resp:  16     Temp:       TempSrc:       SpO2: 100% 99% 97% 97%   Weight:       Height:           Active LDAs/IV Access:   Lines, Drains & Airways       Active LDAs       Name Placement date Placement time Site Days    Peripheral IV 11/01/22 1020 Right Forearm 11/01/22  1020  Forearm  less than 1    Peripheral IV 11/01/22 1028 Left Forearm 11/01/22  1028  Forearm  less than 1                    Skin Condition:   Skin Assessments (last day)       None             Labs (abnormal labs have a star):   Labs Reviewed   COMPREHENSIVE METABOLIC PANEL - Abnormal; Notable for the following components:       Result Value    Glucose 116 (*)     BUN 50 (*)     Creatinine 2.77 (*)      CO2 17.0 (*)     Anion Gap 21.0 (*)     eGFR 18.6 (*)     All other components within normal limits    Narrative:     GFR Normal >60  Chronic Kidney Disease <60  Kidney Failure <15     D-DIMER, QUANTITATIVE - Abnormal; Notable for the following components:    D-Dimer, Quantitative 1.09 (*)     All other components within normal limits    Narrative:     Reference Range  --------------------------------------------------------------------     < 0.50   Negative Predictive Value  0.50-0.59   Indeterminate    >= 0.60   Probable VTE             A very low percentage of patients with DVT may yield D-Dimer results   below the cut-off of 0.50 mg/L FEU.  This is known to be more   prevalent in patients with distal DVT.             Results of this test should always be interpreted in conjunction with   the patient's medical history, clinical presentation and other   findings.  Clinical diagnosis should not be based on the result of   [place] D-Dimer alone.   BNP (IN-HOUSE) - Abnormal; Notable for the following components:    proBNP 1,217.0 (*)     All other components within normal limits    Narrative:     Among patients with dyspnea, NT-proBNP is highly sensitive for the detection of acute congestive heart failure. In addition NT-proBNP of <300 pg/ml effectively rules out acute congestive heart failure with 99% negative predictive value.    Results may be falsely decreased if patient taking Biotin.     CBC WITH AUTO DIFFERENTIAL - Abnormal; Notable for the following components:    Hemoglobin 11.5 (*)     All other components within normal limits   URINALYSIS W/ MICROSCOPIC IF INDICATED (NO CULTURE) - Abnormal; Notable for the following components:    Color, UA Dark Yellow (*)     Appearance, UA Cloudy (*)     Ketones, UA Trace (*)     Bilirubin, UA Small (1+) (*)     Protein, UA 30 mg/dL (1+) (*)     Leuk Esterase, UA Trace (*)     All other components within normal limits   URINALYSIS, MICROSCOPIC ONLY - Abnormal;  "Notable for the following components:    RBC, UA 0-2 (*)     WBC, UA 0-2 (*)     All other components within normal limits   LEGIONELLA ANTIGEN, URINE - Normal   STREP PNEUMO AG, URINE OR CSF - Normal   LIPASE - Normal   TROPONIN (IN-HOUSE) - Normal    Narrative:     Troponin T Reference Range:  <= 0.03 ng/mL-   Negative for AMI  >0.03 ng/mL-     Abnormal for myocardial necrosis.  Clinicians would have to utilize clinical acumen, EKG, Troponin and serial changes to determine if it is an Acute Myocardial Infarction or myocardial injury due to an underlying chronic condition.       Results may be falsely decreased if patient taking Biotin.     PROCALCITONIN - Normal    Narrative:     As a Marker for Sepsis (Non-Neonates):    1. <0.5 ng/mL represents a low risk of severe sepsis and/or septic shock.  2. >2 ng/mL represents a high risk of severe sepsis and/or septic shock.    As a Marker for Lower Respiratory Tract Infections that require antibiotic therapy:    PCT on Admission    Antibiotic Therapy       6-12 Hrs later    >0.5                Strongly Recommended  >0.25 - <0.5        Recommended   0.1 - 0.25          Discouraged              Remeasure/reassess PCT  <0.1                Strongly Discouraged     Remeasure/reassess PCT    As 28 day mortality risk marker: \"Change in Procalcitonin Result\" (>80% or <=80%) if Day 0 (or Day 1) and Day 4 values are available. Refer to http://www.[employer]-pct-calculator.com    Change in PCT <=80%  A decrease of PCT levels below or equal to 80% defines a positive change in PCT test result representing a higher risk for 28-day all-cause mortality of patients diagnosed with severe sepsis for septic shock.    Change in PCT >80%  A decrease of PCT levels of more than 80% defines a negative change in PCT result representing a lower risk for 28-day all-cause mortality of patients diagnosed with severe sepsis or septic shock.      POC LACTATE - Normal   BLOOD CULTURE   BLOOD CULTURE "   RESPIRATORY CULTURE   MRSA SCREEN, PCR   SODIUM, URINE, RANDOM   POC LACTATE   CBC AND DIFFERENTIAL    Narrative:     The following orders were created for panel order CBC & Differential.  Procedure                               Abnormality         Status                     ---------                               -----------         ------                     CBC Auto Differential[[id number]]        Abnormal            Final result                 Please view results for these tests on the individual orders.   EXTRA TUBES    Narrative:     The following orders were created for panel order Extra Tubes.  Procedure                               Abnormality         Status                     ---------                               -----------         ------                     Gold Top - SST[[id number]]                                   Final result                 Please view results for these tests on the individual orders.   GOLD TOP - [facility]       LOC: Person, Place, Time, and Situation    Telemetry:  Med/Surg    Cardiac Monitoring Ordered: yes    EKG:   ECG 12 Lead Dyspnea   Preliminary Result   HEART RATE= 74  bpm   RR Interval= 808  ms   NV Interval= 135  ms   P Horizontal Axis=   deg   P Front Axis= 66  deg   QRSD Interval= 106  ms   QT Interval= 442  ms   QRS Axis= -15  deg   T Wave Axis= 61  deg   - NORMAL ECG -   Sinus rhythm   Electronically Signed By:    Date and Time of Study: 2022-11-01 10:12:37          Medications Given in the ED:   Medications   sodium chloride 0.9 % flush 10 mL (has no administration in time range)   nitroglycerin (NITROSTAT) SL tablet 0.4 mg (has no administration in time range)   sodium chloride 0.9 % flush 10 mL (has no administration in time range)   sodium chloride 0.9 % flush 10 mL (has no administration in time range)   acetaminophen (TYLENOL) tablet 650 mg (650 mg Oral Given 11/1/22 1702)     Or   acetaminophen (TYLENOL) 160 MG/5ML solution 650 mg ( Oral Not Given:  See Alt  11/1/22 1702)     Or   acetaminophen (TYLENOL) suppository 650 mg ( Rectal Not Given:  See Alt 11/1/22 1702)   aluminum-magnesium hydroxide-simethicone (MAALOX MAX) 400-400-40 MG/5ML suspension 15 mL (has no administration in time range)   sennosides-docusate (PERICOLACE) 8.6-50 MG per tablet 2 tablet (has no administration in time range)     And   polyethylene glycol (MIRALAX) packet 17 g (has no administration in time range)     And   bisacodyl (DULCOLAX) EC tablet 5 mg (has no administration in time range)     And   bisacodyl (DULCOLAX) suppository 10 mg (has no administration in time range)   ondansetron (ZOFRAN) tablet 4 mg ( Oral Not Given:  See Alt 11/1/22 1512)     Or   ondansetron (ZOFRAN) injection 4 mg (4 mg Intravenous Given 11/1/22 1512)   melatonin tablet 5 mg (has no administration in time range)   ipratropium-albuterol (DUO-NEB) nebulizer solution 3 mL (3 mL Nebulization Given 11/1/22 1707)   potassium chloride (K-DUR,KLOR-CON) CR tablet 40 mEq (has no administration in time range)   potassium chloride (KLOR-CON) packet 40 mEq (has no administration in time range)   Magnesium Sulfate 2 gram infusion - Mg less than or equal to 1.5 mg/dL (has no administration in time range)     Or   Magnesium Sulfate 1 gram infusion - Mg 1.6-1.9 mg/dL (has no administration in time range)   atorvastatin (LIPITOR) tablet 40 mg (has no administration in time range)   busPIRone (BUSPAR) tablet 15 mg (has no administration in time range)   escitalopram (LEXAPRO) tablet 20 mg (has no administration in time range)   hydrOXYzine (ATARAX) tablet 25 mg (has no administration in time range)   metoprolol tartrate (LOPRESSOR) tablet 75 mg (has no administration in time range)   SUMAtriptan (IMITREX) tablet 50 mg (has no administration in time range)   topiramate (TOPAMAX) tablet 50 mg (has no administration in time range)   morphine injection 4 mg (4 mg Intravenous Given 11/1/22 1512)   sodium chloride 0.9 % infusion (75 mL/hr  Intravenous New Bag 11/1/22 1804)   Pharmacy to dose vancomycin (has no administration in time range)   sodium chloride 0.9 % bolus 1,000 mL (1,000 mL Intravenous New Bag 11/1/22 1842)   sodium chloride 0.9 % infusion (125 mL/hr Intravenous New Bag 11/1/22 1842)   Vancomycin Pharmacy Intermittent/Pulse Dosing (has no administration in time range)   cefepime 2 gm IVPB in 100 ml NS (MBP) (has no administration in time range)   sodium chloride 0.9 % bolus 1,000 mL (0 mL Intravenous Stopped 11/1/22 1114)   vancomycin 1500 mg/500 mL 0.9% NS IVPB ([facility]) (0 mg Intravenous Stopped 11/1/22 1325)   cefepime 2 gm IVPB in 100 ml NS (MBP) (0 g Intravenous Stopped 11/1/22 1114)   lactated ringers bolus 1,000 mL (0 mL Intravenous Stopped 11/1/22 1321)   fentaNYL citrate (PF) (SUBLIMAZE) injection 50 mcg (50 mcg Intravenous Given 11/1/22 1210)       Imaging results:  CT Chest Without Contrast Diagnostic    Result Date: 11/1/2022  1. Patchy groundglass opacities within the bilateral lower lobes, likely representing bronchopneumonia and/or bronchiolitis.    Electronically Signed By-Tee Rao MD On:11/1/2022 12:51 PM This report was finalized on [id number] by  Tee Rao MD.     Social issues:   Social History     Socioeconomic History   • Marital status:    Tobacco Use   • Smoking status: Never   • Smokeless tobacco: Never   Vaping Use   • Vaping Use: Never used       NIH Stroke Scale:  Interval: (not recorded)  1a. Level of Consciousness: (not recorded)  1b. LOC Questions: (not recorded)  1c. LOC Commands: (not recorded)  2. Best Gaze: (not recorded)  3. Visual: (not recorded)  4. Facial Palsy: (not recorded)  5a. Motor Arm, Left: (not recorded)  5b. Motor Arm, Right: (not recorded)  6a. Motor Leg, Left: (not recorded)  6b. Motor Leg, Right: (not recorded)  7. Limb Ataxia: (not recorded)  8. Sensory: (not recorded)  9. Best Language: (not recorded)  10. Dysarthria: (not recorded)  11. Extinction and  Inattention (formerly Neglect): (not recorded)    Total (NIH Stroke Scale): (not recorded)     Additional notable assessment information:     Nursing report ED to floor:    Umu Elias RN   22 19:33 EDT      Electronically signed by Umu Elias RN at 22 1933          Physician Progress Notes (all)      Uche Siegel MD at 22 1338              [facility] Medicine Services Daily Progress Note    Patient Name: Andressa Marks  : 1960  MRN: [id number]  Primary Care Physician:  Provider, No Known  Date of admission: 2022      Subjective       Patient's renal function continues to improve and blood pressure is now elevated.  She reports that she is feeling worse this morning from a respiratory perspective.  CT angiography of the chest ordered now that her renal function is appropriate.  Showing some edema and small bilateral pleural effusions.  I discontinued her fluids and have asked pulmonary to review the case.    Objective       Vitals:   Temp:  [98 °F (36.7 °C)-98.9 °F (37.2 °C)] 98.9 °F (37.2 °C)  Heart Rate:  [66-82] 72  Resp:  [18-20] 20  BP: (134-181)/(68-76) 175/76  Flow (L/min):  [0-2] 0    PHYSICAL EXAM:     Constitutional:       Appearance: Appears unwell but not toxic  HENT:      Head: Normocephalic and atraumatic.   Eyes:      Extraocular Movements: Extraocular movements intact.      Pupils: Pupils are equal, round, and reactive to light.   Cardiovascular:      Rate and Rhythm: Normal rate and regular rhythm.      Pulses: Normal pulses.      Heart sounds: Normal heart sounds.   Pulmonary:     Lungs are clear to auscultation bilaterally; no wheezes appreciated  Abdominal:      General: Abdomen is flat. Bowel sounds are normal. There is no distension.   Musculoskeletal:      Cervical back: Normal range of motion and neck supple.      Right lower leg: No edema.      Left lower leg: No edema.   Skin:     General: Skin is warm.   Neurological:       General: No focal deficit present.      Mental Status: He is alert and oriented to person, place, and time.   Psychiatric:         Mood and Affect: Mood normal.         Behavior: Behavior normal.         Assessment & Plan    Pneumonia, multifocal  Human metapneumovirus   - Initial CT chest without contrast showing patchy groundglass opacities within the bilateral lower lobes  - Respiratory viral panel positive for human metapneumovirus  - Repeat CT angiography of the chest showing edema and small bilateral pleural effusions  - Pulmonary consulted  - Holding IV fluids for now and may benefit from some diuresis and/oral steroids     IRENE, improved   - Trend BMP  - Was seen by nephrology who subsequently signed off     A-fib, stable   -Continue metoprolol     Hypertension   Hyperlipidemia  Migraines  - Continue  home meds    DVT prophylaxis:  Medical DVT prophylaxis orders are present.    CODE STATUS:    Level Of Support Discussed With: Patient  Code Status (Patient has no pulse and is not breathing): CPR (Attempt to Resuscitate)  Medical Interventions (Patient has pulse or is breathing): Full Support      Expected length of stay: TBD    Electronically signed by Uche Siegel MD, 22, 13:38 EDT.  [facility] Team               Electronically signed by Uche Siegel MD at 22 1341     Uche Siegel MD at 22 1446              [facility] Medicine Services Daily Progress Note    Patient Name: Andressa Marks  : 1960  MRN: [id number]  Primary Care Physician:  Provider, No Known  Date of admission: 2022      Subjective       Patient reports that she is feeling better this morning.  Renal function has significantly improved.  Nephrology has seen the patient and signed off.  We will keep for another 24 hours to monitor her respiratory status.  Hopefully discharge tomorrow.    Objective       Vitals:   Temp:  [97.9 °F (36.6 °C)-98 °F (36.7 °C)] 97.9 °F  (36.6 °C)  Heart Rate:  [56-87] 57  Resp:  [16-23] 16  BP: (106-134)/(44-70) 134/70  Flow (L/min):  [1-2] 1    PHYSICAL EXAM:     Constitutional:       Appearance: Normal appearance.   HENT:      Head: Normocephalic and atraumatic.   Eyes:      Extraocular Movements: Extraocular movements intact.      Pupils: Pupils are equal, round, and reactive to light.   Cardiovascular:      Rate and Rhythm: Normal rate and regular rhythm.      Pulses: Normal pulses.      Heart sounds: Normal heart sounds.   Pulmonary:     Lungs are clear to auscultation bilaterally; no wheezes appreciated  Abdominal:      General: Abdomen is flat. Bowel sounds are normal. There is no distension.   Musculoskeletal:      Cervical back: Normal range of motion and neck supple.      Right lower leg: No edema.      Left lower leg: No edema.   Skin:     General: Skin is warm.   Neurological:      General: No focal deficit present.      Mental Status: He is alert and oriented to person, place, and time.   Psychiatric:         Mood and Affect: Mood normal.         Behavior: Behavior normal.         Assessment & Plan    Pneumonia   -CT chest without Patchy groundglass opacities within the bilateral lower lobes, likely  -Respiratory viral panel positive for human metapneumovirus  - We will continue antibiotics for now but will likely discontinue on discharge  - Wean O2      IRENE, improved   - Trend BMP  - IV fluids  - Was seen by nephrology who subsequently signed off     A-fib, stable   -Continue metoprolol     Hypertension   Hyperlipidemia  Migraines  - Continue  home meds    DVT prophylaxis:  Medical DVT prophylaxis orders are present.    CODE STATUS:    Level Of Support Discussed With: Patient  Code Status (Patient has no pulse and is not breathing): CPR (Attempt to Resuscitate)  Medical Interventions (Patient has pulse or is breathing): Full Support      Expected length of stay: TBD    Electronically signed by Uche Siegel MD, 11/02/22, 14:46  "EDT.  [facility]               Electronically signed by Uche Siegel MD at 11/02/22 1449     Christiano Jose MD at 11/02/22 1025          [facility] NEPHROLOGY PROGRESS NOTE     LOS: 0 days    Patient Care Team:  Provider, No Known as PCP - General      Subjective     Patient seen and examined this morning.  Patient has cough and wheezing  Urine output started to improve    Objective     Vital Sign Min/Max for last 24 hours  Temp:  [98 °F (36.7 °C)] 98 °F (36.7 °C)  Heart Rate:  [56-93] 71  Resp:  [12-23] 23  BP: ()/(42-99) 108/53                       Flowsheet Rows    Flowsheet Row First Filed Value   Admission Height 162.6 cm (64\") Documented at 11/01/2022 1007   Admission Weight 74.8 kg (165 lb) Documented at 11/01/2022 1007          I/O this shift:  In: 360 [P.O.:360]  Out: -   I/O last 3 completed shifts:  In: 4972.9 [I.V.:1372.9; IV Piggyback:3600]  Out: 0     Physical Exam:  Physical Exam    General Appearance: alert, oriented x 3, no acute distress   Skin: warm and dry  HEENT: oral mucosa dry, nonicteric sclera  Neck: supple, no JVD  Lungs: Bilateral wheezing  Heart: RRR, normal S1 and S2  Abdomen: soft, nontender, nondistended  : no palpable bladder  Extremities: no edema, cyanosis or clubbing  Neuro: normal speech and mental status        LABS:  Lab Results   Component Value Date    CALCIUM 7.9 (L) 11/02/2022     Results from last 7 days   Lab Units 11/02/22  0808 11/01/22  1024   MAGNESIUM mg/dL 1.9  --    SODIUM mmol/L 145 141   POTASSIUM mmol/L 4.5 3.6   CHLORIDE mmol/L 116* 103   CO2 mmol/L 20.0* 17.0*   BUN mg/dL 39* 50*   CREATININE mg/dL 1.24* 2.77*   GLUCOSE mg/dL 111* 116*   CALCIUM mg/dL 7.9* 9.0   WBC 10*3/mm3 5.60 8.30   HEMOGLOBIN g/dL 9.1* 11.5*   PLATELETS 10*3/mm3 175 258   ALT (SGPT) U/L  --  12   AST (SGOT) U/L  --  21     Lab Results   Component Value Date    TROPONINT <0.010 11/01/2022     Estimated Creatinine Clearance: 46.6 mL/min (A) (by C-G formula based on SCr " of 1.24 mg/dL (H)).      Brief Urine Lab Results  (Last result in the past 365 days)      Color   Clarity   Blood   Leuk Est   Nitrite   Protein   CREAT   Urine HCG        11/01/22 1040 Dark Yellow  Comment: Result checked   Cloudy  Comment: Result checked   Negative   Trace   Negative   30 mg/dL (1+)               WEIGHTS:     Wt Readings from Last 1 Encounters:   11/02/22 0435 79 kg (174 lb 2.6 oz)   11/01/22 1007 74.8 kg (165 lb)       atorvastatin, 40 mg, Oral, Nightly  busPIRone, 15 mg, Oral, TID  cefepime, 2 g, Intravenous, Q12H  escitalopram, 20 mg, Oral, Daily  guaiFENesin, 600 mg, Oral, Q12H  metoprolol tartrate, 75 mg, Oral, BID  senna-docusate sodium, 2 tablet, Oral, BID  sodium chloride, 10 mL, Intravenous, Q12H  topiramate, 50 mg, Oral, BID      sodium chloride, 75 mL/hr, Last Rate: 75 mL/hr (11/01/22 1804)        Assessment & Plan       1.  Acute kidney injury  Nonoliguric ATN due to dehydration and hypotension  Renal function improving with IV hydration.  Creatinine 1.2 Mg/DL this morning     2.  History of hypertension  Patient hypotensive on presentation,Secondary to dehydration or SIRS  Blood pressure still low but improving  Antihypertensives on hold     3.  Dyspnea/cough  Probably due to pneumonia.     4.  Metabolic acidosis  Increased anion gap, secondary to IRENE  Improving with IV hydration     Recs:  -Continue gentle IV hydration  -Keep off antihypertensives  -I will sign off.  Please call for any question    Christiano Jose MD  11/02/22  10:26 EDT    Electronically signed by Christiano Jose MD at 11/02/22 1027          Consult Notes (all)      Christiano Jose MD at 11/01/22 1827      Consult Orders    1. Inpatient Nephrology Consult [[id number]] ordered by Kelli Curiel PA-C at 11/01/22 4882               [facility] NEPHROLOGY CONSULT NOTE    Referring Provider: Dr. Blake Walker  Reason for Consultation: Acute kidney injury    Chief complaint.  Shortness of breath, cough    History of present illness:   Patient is  62 years old female with history of hypertension, depression, paroxysmal atrial fibrillation, presented to the hospital with few days history of cough, shortness of breath, thick expectoration, nausea, decreased appetite and oral intake.  Patient did not have any abdominal pain, vomiting, diarrhea, constipation, hematuria or dysuria but had decreased urine output since yesterday.  Patient was hypotensive on presentation so received IV fluid bolus.  Her creatinine was 2.77 Mg/DL which prompted renal consult.  Patient denies any use of NSAIDs.  Patient is on metoprolol, lisinopril and hydrochlorothiazide for blood pressure control as per HPI    History  Past Medical History:   Diagnosis Date   • Atrial fibrillation (HCC)    • Atrial fibrillation (HCC)    • Depression    • Hypertension    • Migraine      Past Surgical History:   Procedure Laterality Date   • THORACOTOMY Left     for TB     Social History     Tobacco Use   • Smoking status: Never   • Smokeless tobacco: Never   Vaping Use   • Vaping Use: Never used     Family History   Problem Relation Age of Onset   • No Known Problems Mother    • No Known Problems Father        Review of Systems  ROS  As per HPI  Objective     Vital Signs  Temp:  [98.6 °F (37 °C)] 98.6 °F (37 °C)  Heart Rate:  [57-93] 64  Resp:  [12-26] 16  BP: ()/(42-99) 131/53    I/O this shift:  In: 2600 [IV Piggyback:2600]  Out: -   No intake/output data recorded.    Physical Exam:  Physical Exam    General Appearance: alert, oriented x 3, no acute distress   Skin: warm and dry  HEENT: oral mucosa dry, nonicteric sclera  Neck: supple, no JVD  Lungs: Few scattered wheezes  Heart: RRR, normal S1 and S2  Abdomen: soft, nontender, nondistended  : no palpable bladder  Extremities: no edema, cyanosis or clubbing  Neuro: normal speech and mental status     Results Review:   I reviewed the patient's new clinical results.    Lab Results   Component Value Date    CALCIUM 9.0 11/01/2022     Results  from last 7 days   Lab Units 11/01/22  1024   SODIUM mmol/L 141   POTASSIUM mmol/L 3.6   CHLORIDE mmol/L 103   CO2 mmol/L 17.0*   BUN mg/dL 50*   CREATININE mg/dL 2.77*   GLUCOSE mg/dL 116*   CALCIUM mg/dL 9.0   WBC 10*3/mm3 8.30   HEMOGLOBIN g/dL 11.5*   PLATELETS 10*3/mm3 258   ALT (SGPT) U/L 12   AST (SGOT) U/L 21     Lab Results   Component Value Date    TROPONINT <0.010 11/01/2022     Estimated Creatinine Clearance: 20.3 mL/min (A) (by C-G formula based on SCr of 2.77 mg/dL (H)).No results found for: URICACID    Brief Urine Lab Results  (Last result in the past 365 days)      Color   Clarity   Blood   Leuk Est   Nitrite   Protein   CREAT   Urine HCG        11/01/22 1040 Dark Yellow  Comment: Result checked   Cloudy  Comment: Result checked   Negative   Trace   Negative   30 mg/dL (1+)                 Prior to Admission medications    Medication Sig Start Date End Date Taking? Authorizing Provider   atorvastatin (LIPITOR) 40 MG tablet Take 1 tablet by mouth Daily.   Yes ProviderDanny MD   busPIRone (BUSPAR) 15 MG tablet Take 1 tablet by mouth 3 (Three) Times a Day.   Yes ProviderDanny MD   escitalopram (LEXAPRO) 20 MG tablet Take 1 tablet by mouth Daily.   Yes ProviderDanny MD   fluticasone (FLONASE) 50 MCG/ACT nasal spray 1 spray into the nostril(s) as directed by provider Daily.   Yes ProviderDanny MD   hydroCHLOROthiazide (HYDRODIURIL) 25 MG tablet Take 1 tablet by mouth Daily.   Yes ProviderDanny MD   hydrOXYzine (ATARAX) 25 MG tablet Take 1 tablet by mouth 3 (Three) Times a Day As Needed for Itching.   Yes ProviderDanny MD   lisinopril (PRINIVIL,ZESTRIL) 20 MG tablet Take 1 tablet by mouth 2 (Two) Times a Day.   Yes Danny Cervantes MD   metoprolol tartrate (LOPRESSOR) 50 MG tablet Take 1.5 tablets by mouth 2 (Two) Times a Day.   Yes Danny Cervantes MD   multivitamin with minerals tablet tablet Take 1 tablet by mouth Daily.   Yes Provider  MD Danny   SUMAtriptan (IMITREX) 50 MG tablet Take 1 tablet by mouth Every 2 (Two) Hours As Needed for Migraine. Take one tablet at onset of headache. May repeat dose one time in 2 hours if headache not relieved.   Yes ProviderDanny MD   topiramate (TOPAMAX) 50 MG tablet Take 1 tablet by mouth 2 (Two) Times a Day.   Yes ProviderDanny MD   traZODone (DESYREL) 50 MG tablet Take 1-2 tablets by mouth At Night As Needed for Sleep.   Yes ProviderDanny MD       atorvastatin, 40 mg, Oral, Nightly  busPIRone, 15 mg, Oral, TID  [START ON 11/2/2022] escitalopram, 20 mg, Oral, Daily  [START ON 11/2/2022] metoprolol tartrate, 75 mg, Oral, BID  senna-docusate sodium, 2 tablet, Oral, BID  sodium chloride, 1,000 mL, Intravenous, Once  sodium chloride, 10 mL, Intravenous, Q12H  topiramate, 50 mg, Oral, BID      Pharmacy to dose vancomycin,   sodium chloride, 75 mL/hr, Last Rate: 75 mL/hr (11/01/22 1804)  sodium chloride, 125 mL/hr        Assessment & Plan       1.  Acute kidney injury  Patient had normal creatinine as baseline but increased to 2.77 Mg/DL.  Seems to be prerenal in etiology.  Patient clinically looks dry.  Electrolytes acceptable    2.  History of hypertension  Patient hypotensive on presentation,Secondary to dehydration or SIRS  Patient received IV fluid bolus on presentation.  Antihypertensives on hold    3.  Dyspnea/cough  Probably due to pneumonia.    4.  Metabolic acidosis  Increased anion gap, secondary to IRENE    Recs:  -Repeat IV fluid bolus and continue IV hydration  -Keep off antihypertensives  -Order urine sodium  -Surveillance labs    I discussed the patients findings and my recommendations with patient, family and nursing staff    Christiano Jose MD  11/01/22  18:27 EDT            Electronically signed by Christiano Jose MD at 11/01/22 2344

## 2022-11-04 NOTE — PLAN OF CARE
Problem: Hypertension Comorbidity  Goal: Blood Pressure in Desired Range  Outcome: Ongoing, Progressing  Intervention: Maintain Blood Pressure Management  Recent Flowsheet Documentation  Taken 11/4/2022 1200 by Daphney Gonzalez RN  Medication Review/Management: medications reviewed     Problem: Breathing Pattern Ineffective  Goal: Effective Breathing Pattern  Outcome: Ongoing, Progressing  Intervention: Promote Improved Breathing Pattern  Recent Flowsheet Documentation  Taken 11/4/2022 0830 by Daphney Gonzalez RN  Airway/Ventilation Management: airway patency maintained     Problem: Infection  Goal: Absence of Infection Signs and Symptoms  Outcome: Ongoing, Progressing  Intervention: Prevent or Manage Infection  Recent Flowsheet Documentation  Taken 11/4/2022 1200 by Daphney Gonzalez RN  Isolation Precautions: precautions maintained     Problem: Adult Inpatient Plan of Care  Goal: Plan of Care Review  Outcome: Ongoing, Progressing  Goal: Patient-Specific Goal (Individualized)  Outcome: Ongoing, Progressing  Goal: Absence of Hospital-Acquired Illness or Injury  Outcome: Ongoing, Progressing  Intervention: Identify and Manage Fall Risk  Recent Flowsheet Documentation  Taken 11/4/2022 1400 by Daphney Gonzalez RN  Safety Promotion/Fall Prevention: safety round/check completed  Taken 11/4/2022 1200 by Daphney Gonzalez RN  Safety Promotion/Fall Prevention:   safety round/check completed   nonskid shoes/slippers when out of bed  Taken 11/4/2022 0830 by Daphney Gonzalez RN  Safety Promotion/Fall Prevention: safety round/check completed  Intervention: Prevent Infection  Recent Flowsheet Documentation  Taken 11/4/2022 1400 by Daphney Gonzalez RN  Infection Prevention: personal protective equipment utilized  Taken 11/4/2022 1200 by Daphney Gonzalez RN  Infection Prevention: personal protective equipment utilized  Goal: Optimal Comfort and Wellbeing  Outcome: Ongoing, Progressing  Goal: Readiness for Transition of  Care  Outcome: Ongoing, Progressing   Goal Outcome Evaluation:

## 2022-11-04 NOTE — CASE MANAGEMENT/SOCIAL WORK
Continued Stay Note  [facility]     Patient Name: Andressa Marks  MRN: [id number]  Today's Date: 11/4/2022    Admit Date: 11/1/2022    Plan: Routine home with daughter.   Discharge Plan     Row Name 11/04/22 1407       Plan    Plan Routine home with daughter.    Patient/Family in Agreement with Plan yes    Plan Comments DC barriers: Pulm following, continuing IV Cefepime; repeat chest x-ray, monitoring fevers.              Phone communication or documentation only - no physical contact with patient or family.      Megan Naegele, RN      Office Phone: [phone]  Office Cell: [phone]

## 2022-11-04 NOTE — PROGRESS NOTES
[facility] Medicine Services Daily Progress Note    Patient Name: Andressa Marks  : 1960  MRN: [id number]  Primary Care Physician:  Provider, No Known  Date of admission: 2022      Subjective      Patient's renal function has trended to normal.  Saturating appropriately on room air.  I have ordered repeat chest x-ray.  Pulmonary consulted yesterday.  Did spike a fever overnight to 100.6.  Currently on broad-spectrum antibiotics.    Objective      Vitals:   Temp:  [98.3 °F (36.8 °C)-100.6 °F (38.1 °C)] 98.8 °F (37.1 °C)  Heart Rate:  [53-93] 58  Resp:  [17-20] 20  BP: (113-153)/(63-88) 113/63    PHYSICAL EXAM:     Constitutional:       Appearance: Appears unwell but not toxic  HENT:      Head: Normocephalic and atraumatic.   Eyes:      Extraocular Movements: Extraocular movements intact.      Pupils: Pupils are equal, round, and reactive to light.   Cardiovascular:      Rate and Rhythm: Normal rate and regular rhythm.      Pulses: Normal pulses.      Heart sounds: Normal heart sounds.   Pulmonary:     Lungs are clear to auscultation bilaterally; no wheezes appreciated  Abdominal:      General: Abdomen is flat. Bowel sounds are normal. There is no distension.   Musculoskeletal:      Cervical back: Normal range of motion and neck supple.      Right lower leg: No edema.      Left lower leg: No edema.   Skin:     General: Skin is warm.   Neurological:      General: No focal deficit present.      Mental Status: He is alert and oriented to person, place, and time.   Psychiatric:         Mood and Affect: Mood normal.         Behavior: Behavior normal.         Assessment & Plan    Pneumonia, multifocal  Human metapneumovirus   - Initial CT chest without contrast showing patchy groundglass opacities within the bilateral lower lobes  - Respiratory viral panel positive for human metapneumovirus  - Repeat CT angiography of the chest showing edema and small bilateral pleural effusions  -  Pulmonary consulted  - Holding IV fluids for now and may benefit from some diuresis and/oral steroids     IRENE, resolved   - Trend BMP  - Was seen by nephrology who subsequently signed off     A-fib, stable   -Continue metoprolol     Hypertension   Hyperlipidemia  Migraines  - Continue  home meds    DVT prophylaxis:  Medical DVT prophylaxis orders are present.    CODE STATUS:    Level Of Support Discussed With: Patient  Code Status (Patient has no pulse and is not breathing): CPR (Attempt to Resuscitate)  Medical Interventions (Patient has pulse or is breathing): Full Support      Expected length of stay: TBD    Electronically signed by Uche Siegel MD, 11/04/22, 13:55 EDT.  [facility] Hospitalist Team

## 2022-11-05 LAB
CRP SERPL-MCNC: 10.9 MG/DL (ref 0–0.5)
MAGNESIUM SERPL-MCNC: 1.8 MG/DL (ref 1.6–2.4)
QT INTERVAL: 441 MS

## 2022-11-05 PROCEDURE — 25010000002 HEPARIN (PORCINE) PER 1000 UNITS: Performed by: HOSPITALIST

## 2022-11-05 PROCEDURE — 83735 ASSAY OF MAGNESIUM: CPT | Performed by: PHYSICIAN ASSISTANT

## 2022-11-05 PROCEDURE — 94664 DEMO&/EVAL PT USE INHALER: CPT

## 2022-11-05 PROCEDURE — 86140 C-REACTIVE PROTEIN: CPT | Performed by: HOSPITALIST

## 2022-11-05 PROCEDURE — 94799 UNLISTED PULMONARY SVC/PX: CPT

## 2022-11-05 PROCEDURE — 25010000002 CEFEPIME PER 500 MG: Performed by: HOSPITALIST

## 2022-11-05 PROCEDURE — 94761 N-INVAS EAR/PLS OXIMETRY MLT: CPT

## 2022-11-05 RX ORDER — BUDESONIDE 0.5 MG/2ML
0.5 INHALANT ORAL
Status: DISCONTINUED | OUTPATIENT
Start: 2022-11-05 | End: 2022-11-08 | Stop reason: HOSPADM

## 2022-11-05 RX ORDER — IPRATROPIUM BROMIDE AND ALBUTEROL SULFATE 2.5; .5 MG/3ML; MG/3ML
3 SOLUTION RESPIRATORY (INHALATION)
Status: DISCONTINUED | OUTPATIENT
Start: 2022-11-05 | End: 2022-11-08 | Stop reason: HOSPADM

## 2022-11-05 RX ADMIN — CEFEPIME 2 G: 2 INJECTION, POWDER, FOR SOLUTION INTRAVENOUS at 21:08

## 2022-11-05 RX ADMIN — TOPIRAMATE 50 MG: 25 TABLET, FILM COATED ORAL at 21:01

## 2022-11-05 RX ADMIN — Medication 10 ML: at 09:12

## 2022-11-05 RX ADMIN — BUDESONIDE 0.5 MG: 0.5 INHALANT RESPIRATORY (INHALATION) at 18:57

## 2022-11-05 RX ADMIN — HYDROCODONE BITARTRATE AND ACETAMINOPHEN 1 TABLET: 5; 325 TABLET ORAL at 09:11

## 2022-11-05 RX ADMIN — TOPIRAMATE 50 MG: 25 TABLET, FILM COATED ORAL at 09:11

## 2022-11-05 RX ADMIN — IPRATROPIUM BROMIDE AND ALBUTEROL SULFATE 3 ML: .5; 3 SOLUTION RESPIRATORY (INHALATION) at 07:36

## 2022-11-05 RX ADMIN — DOXYCYCLINE 100 MG: 100 INJECTION, POWDER, LYOPHILIZED, FOR SOLUTION INTRAVENOUS at 23:40

## 2022-11-05 RX ADMIN — HEPARIN SODIUM 5000 UNITS: 5000 INJECTION INTRAVENOUS; SUBCUTANEOUS at 21:00

## 2022-11-05 RX ADMIN — BUSPIRONE HYDROCHLORIDE 15 MG: 15 TABLET ORAL at 17:15

## 2022-11-05 RX ADMIN — HEPARIN SODIUM 5000 UNITS: 5000 INJECTION INTRAVENOUS; SUBCUTANEOUS at 05:03

## 2022-11-05 RX ADMIN — HYDROCODONE BITARTRATE AND ACETAMINOPHEN 1 TABLET: 5; 325 TABLET ORAL at 03:24

## 2022-11-05 RX ADMIN — BUSPIRONE HYDROCHLORIDE 15 MG: 15 TABLET ORAL at 21:01

## 2022-11-05 RX ADMIN — ESCITALOPRAM OXALATE 20 MG: 10 TABLET ORAL at 09:12

## 2022-11-05 RX ADMIN — HEPARIN SODIUM 5000 UNITS: 5000 INJECTION INTRAVENOUS; SUBCUTANEOUS at 17:15

## 2022-11-05 RX ADMIN — BUSPIRONE HYDROCHLORIDE 15 MG: 15 TABLET ORAL at 09:11

## 2022-11-05 RX ADMIN — DOXYCYCLINE 100 MG: 100 INJECTION, POWDER, LYOPHILIZED, FOR SOLUTION INTRAVENOUS at 10:13

## 2022-11-05 RX ADMIN — METOPROLOL TARTRATE 75 MG: 50 TABLET, FILM COATED ORAL at 21:01

## 2022-11-05 RX ADMIN — HYDROCHLOROTHIAZIDE 25 MG: 25 TABLET ORAL at 09:12

## 2022-11-05 RX ADMIN — SENNOSIDES AND DOCUSATE SODIUM 2 TABLET: 50; 8.6 TABLET ORAL at 09:11

## 2022-11-05 RX ADMIN — CEFEPIME 2 G: 2 INJECTION, POWDER, FOR SOLUTION INTRAVENOUS at 09:11

## 2022-11-05 RX ADMIN — HYDROCODONE BITARTRATE AND ACETAMINOPHEN 1 TABLET: 5; 325 TABLET ORAL at 20:51

## 2022-11-05 RX ADMIN — IPRATROPIUM BROMIDE AND ALBUTEROL SULFATE 3 ML: .5; 3 SOLUTION RESPIRATORY (INHALATION) at 18:54

## 2022-11-05 RX ADMIN — BUDESONIDE 0.5 MG: 0.5 INHALANT RESPIRATORY (INHALATION) at 07:31

## 2022-11-05 RX ADMIN — METOPROLOL TARTRATE 75 MG: 50 TABLET, FILM COATED ORAL at 09:11

## 2022-11-05 RX ADMIN — ATORVASTATIN CALCIUM 40 MG: 40 TABLET, FILM COATED ORAL at 21:01

## 2022-11-05 RX ADMIN — Medication 10 ML: at 21:02

## 2022-11-05 RX ADMIN — IPRATROPIUM BROMIDE AND ALBUTEROL SULFATE 3 ML: .5; 3 SOLUTION RESPIRATORY (INHALATION) at 11:02

## 2022-11-05 NOTE — CONSULTS
PULMONARY CRITICAL CARE CONSULT NOTE      PATIENT IDENTIFICATION:  Name: Andressa Marks  MRN: [id number]  :  1960     Age: 62 y.o.  Sex: female        DATE OF CONSULTATION:  2022  PRIMARY CARE PHYSICIAN    Provider, No Known                  CHIEF COMPLAINT: SOB    History of Present Illness:   Andressa Marks is a 62 y.o. female with a past medical history to include atrial fibrillation rate control, hypertension, depression, migraines who came to the ER with complaints of a 3-day history of shortness of breath and low urine output for 24 hours.  Patient reports she is having a productive cough with brown/bloody sputum for 2 days.  She reports that she is short of breath with coughing.  She denies having any chest pain, nausea, vomiting.  She denies having any abdominal pain, diarrhea or constipation.  She reports decreased urine. In the ER, work-up showed hypotension with BP 71/44 she was given a fluid bolus that improved to 150/99.  She was also given vancomycin in ED. Admitted for further treatment.       Review of Systems:   Constitutional: As above   Eyes: negative   ENT/oropharynx: negative   Cardiovascular: As above   Respiratory: As above   Gastrointestinal: negative   Genitourinary: negative   Neurological: negative   Musculoskeletal: negative   Integument/breast: negative   Endocrine: negative   Allergic/Immunologic: negative     Past Medical History:  Past Medical History:   Diagnosis Date    Atrial fibrillation (HCC)     Atrial fibrillation (HCC)     Depression     Hypertension     Migraine        Past Surgical History:  Past Surgical History:   Procedure Laterality Date    THORACOTOMY Left     for TB        Family History:  Family History   Problem Relation Age of Onset    No Known Problems Mother     No Known Problems Father         Social History:   Social History     Tobacco Use    Smoking status: Never    Smokeless tobacco: Never   Substance Use Topics    Alcohol use: Not on file  "       Allergies:  Allergies   Allergen Reactions    Penicillins Shortness Of Breath       Home Meds:  Medications Prior to Admission   Medication Sig Dispense Refill Last Dose    atorvastatin (LIPITOR) 40 MG tablet Take 1 tablet by mouth Daily.       busPIRone (BUSPAR) 15 MG tablet Take 1 tablet by mouth 3 (Three) Times a Day.       escitalopram (LEXAPRO) 20 MG tablet Take 1 tablet by mouth Daily.       fluticasone (FLONASE) 50 MCG/ACT nasal spray 1 spray into the nostril(s) as directed by provider Daily.       hydroCHLOROthiazide (HYDRODIURIL) 25 MG tablet Take 1 tablet by mouth Daily.       hydrOXYzine (ATARAX) 25 MG tablet Take 1 tablet by mouth 3 (Three) Times a Day As Needed for Itching.       lisinopril (PRINIVIL,ZESTRIL) 20 MG tablet Take 1 tablet by mouth 2 (Two) Times a Day.       metoprolol tartrate (LOPRESSOR) 50 MG tablet Take 1.5 tablets by mouth 2 (Two) Times a Day.       multivitamin with minerals tablet tablet Take 1 tablet by mouth Daily.       SUMAtriptan (IMITREX) 50 MG tablet Take 1 tablet by mouth Every 2 (Two) Hours As Needed for Migraine. Take one tablet at onset of headache. May repeat dose one time in 2 hours if headache not relieved.       topiramate (TOPAMAX) 50 MG tablet Take 1 tablet by mouth 2 (Two) Times a Day.       traZODone (DESYREL) 50 MG tablet Take 1-2 tablets by mouth At Night As Needed for Sleep.          Objective:  tMax 24 hrs: Temp (24hrs), Av.6 °F (37 °C), Min:98.3 °F (36.8 °C), Max:98.8 °F (37.1 °C)      Vitals Ranges:   Temp:  [98.3 °F (36.8 °C)-98.8 °F (37.1 °C)] 98.8 °F (37.1 °C)  Heart Rate:  [53-93] 60  Resp:  [17-20] 19  BP: (113-127)/(63-64) 119/64    Intake and Output Last 3 Shifts:   I/O last 3 completed shifts:  In: 868 [P.O.:868]  Out: -     Exam:  /64 (BP Location: Right arm, Patient Position: Lying)   Pulse 60   Temp 98.8 °F (37.1 °C) (Oral)   Resp 19   Ht 162.6 cm (64\")   Wt 80 kg (176 lb 5.9 oz)   SpO2 92%   BMI 30.27 kg/m²       " 11/02/22  0435 11/03/22  0439   Weight: 79 kg (174 lb 2.6 oz) 80 kg (176 lb 5.9 oz)     General Appearance: Alert     HEENT:  Normocephalic, without obvious abnormality, atraumatic. Conjunctivae/corneas clear.  Normal external ear canals. Nares normal, no drainage.  Neck:  Supple, symmetrical, trachea midline. No JVD.  Lungs /Chest wall:   Bilateral basal rhonchi, respirations unlabored, symmetrical wall movement.     Heart:  Regular rate and rhythm, systolic murmur PMI left sternal border  Abdomen: Soft, nontender, no masses, no organomegaly.    Extremities: Trace edema, no clubbing or cyanosis        Data Review:  All labs (24hrs):   Recent Results (from the past 24 hour(s))   Magnesium    Collection Time: 11/04/22  1:16 AM    Specimen: Blood   Result Value Ref Range    Magnesium 1.7 1.6 - 2.4 mg/dL   Basic Metabolic Panel    Collection Time: 11/04/22  1:16 AM    Specimen: Blood   Result Value Ref Range    Glucose 117 (H) 65 - 99 mg/dL    BUN 18 8 - 23 mg/dL    Creatinine 0.99 0.57 - 1.00 mg/dL    Sodium 140 136 - 145 mmol/L    Potassium 4.4 3.5 - 5.2 mmol/L    Chloride 107 98 - 107 mmol/L    CO2 20.0 (L) 22.0 - 29.0 mmol/L    Calcium 8.6 8.6 - 10.5 mg/dL    BUN/Creatinine Ratio 18.2 7.0 - 25.0    Anion Gap 13.0 5.0 - 15.0 mmol/L    eGFR 63.8 >60.0 mL/min/1.73   Procalcitonin    Collection Time: 11/04/22  1:16 AM    Specimen: Blood   Result Value Ref Range    Procalcitonin 0.08 0.00 - 0.25 ng/mL   Lavender Top    Collection Time: 11/04/22  1:16 AM   Result Value Ref Range    Extra Tube hold for add-on    Phosphorus    Collection Time: 11/04/22  8:18 AM    Specimen: Blood   Result Value Ref Range    Phosphorus 2.4 (L) 2.5 - 4.5 mg/dL   C-reactive Protein    Collection Time: 11/04/22  8:18 AM    Specimen: Blood   Result Value Ref Range    C-Reactive Protein 14.25 (H) 0.00 - 0.50 mg/dL   Lavender Top    Collection Time: 11/04/22  9:13 AM   Result Value Ref Range    Extra Tube hold for add-on         Imaging:  CT  Chest Without Contrast Diagnostic    Result Date: 11/1/2022  EXAM:CT CHEST WO CONTRAST DIAGNOSTIC-  DATE OF EXAM: 11/1/2022 11:27 AM  INDICATION: Cough, hypotension  COMPARISON: None available  TECHNIQUE: Serial and axial CT images of the chest were obtained. Reconstructions in the coronal and sagittal planes were performed. Automated exposure control and iterative reconstruction methods were used.  FINDINGS: The visualized soft tissue structures at the base of the neck including the thyroid appear within normal limits. There is no lower cervical or axillary adenopathy.  The heart size is normal. There is no pericardial effusion. The ascending thoracic aorta is at the upper limit of normal measuring 4.0 cm. The main pulmonary artery appears normal in caliber. There is a right paratracheal lymph node measuring 9 mm in short axis with central calcification. This is likely a benign reactive lymph node. There are bilateral calcified lymph nodes likely related to chronic granulomatous disease. There is no pathologic adenopathy. The esophagus is normal in course and caliber.  The central airways are patent. There are faint bilateral patchy groundglass opacities within bilateral lower lobes likely related to mild infectious or inflammatory bronchiolitis. Mild bronchopneumonia could also have this appearance. There is no pleural effusion or pneumothorax. There is calcified granuloma within the right middle lobe.   Visualized portions of the upper abdomen demonstrates no acute findings. There are degenerative changes of the lower cervical spine.      1. Patchy groundglass opacities within the bilateral lower lobes, likely representing bronchopneumonia and/or bronchiolitis.    Electronically Signed By-Tee Rao MD On:11/1/2022 12:51 PM This report was finalized on [id number] by  Tee Rao MD.    CT Angiogram Chest Pulmonary Embolism    Result Date: 11/3/2022  CT ANGIOGRAM CHEST PULMONARY EMBOLISM-  Date of  Exam: 11/3/2022 12:28 PM  Indication: Pulmonary embolism (PE) suspected, positive D-dimer; N17.9-Acute kidney failure, unspecified; J18.0-Bronchopneumonia, unspecified organism.  Comparison: CT chest without contrast 11/1/2022.  Technique: Serial and axial CT images of the chest were obtained following the uneventful intravenous administration of 100 cc Isovue-370 contrast. Reconstructions in the coronal and sagittal planes were also performed. In addition, a 3 D volume rendered image was obtained after post processing. Automated exposure control and iterative reconstruction methods were used.  FINDINGS:  Some of the images are degraded by respiratory motion.  No pulmonary embolism is seen. Heart size is mildly enlarged but stable with mild to moderate coronary artery calcifications. No thoracic aortic aneurysm or aortic dissection. Mild ectasia of the descending thoracic aorta.  Small bilateral pleural effusions have developed, with partially loculated fluid within the left major fissure. No pericardial effusion is seen. Mild the prominent subcarinal lymph node thought to be benign and reactive, measuring about 12 mm short axis.  Interstitial thickening and hazy groundglass densities have developed within the lower lung zones, favored to represent interstitial edema. Mild right middle lobe, right lower lobe and left lower lobe atelectatic changes are present. There is mild bronchial wall thickening in the lung bases without mucus plugging.  No acute or suspicious osseous abnormalities are identified.  The included portions the upper abdominal organs are within normal limits.        1. No pulmonary embolism. 2. Features of developing interstitial and alveolar edema with small bilateral pleural effusions, new since 11/1/2022. 3. New mild bilateral lower lobe and right middle lobe atelectasis.    Electronically Signed By-Pauline Callahan MD On:11/3/2022 1:10 PM This report was finalized on [id number] by  Pauline  MD Sindy.    XR Chest PA & Lateral    Result Date: 11/4/2022  DATE OF EXAM: 11/4/2022 2:18 PM  PROCEDURE: XR CHEST PA AND LATERAL-  INDICATIONS: edema? PNA?; N17.9-Acute kidney failure, unspecified; J18.0-Bronchopneumonia, unspecified organism  COMPARISON: No Comparisons Available  TECHNIQUE: Two radiologic views of the chest , PA and lateral were obtained.  FINDINGS: Cardiomegaly. Pulmonary vasculature appears within normal limits. Lungs clear other than atelectasis in the bases. Costophrenic angles sharp. Staples in the lingula.      No radiographic findings of pulmonary edema. No acute process demonstrated  Electronically Signed By-Ross Wyatt On:11/4/2022 2:56 PM This report was finalized on [id number] by  Ross Wyatt, .       Current:  atorvastatin, 40 mg, Oral, Nightly  busPIRone, 15 mg, Oral, TID  cefepime, 2 g, Intravenous, BID  escitalopram, 20 mg, Oral, Daily  heparin (porcine), 5,000 Units, Subcutaneous, Q8H  hydroCHLOROthiazide, 25 mg, Oral, Daily  metoprolol tartrate, 75 mg, Oral, BID  senna-docusate sodium, 2 tablet, Oral, BID  sodium chloride, 10 mL, Intravenous, Q12H  topiramate, 50 mg, Oral, BID        Infusion:        PRN:    acetaminophen **OR** acetaminophen **OR** acetaminophen    aluminum-magnesium hydroxide-simethicone    senna-docusate sodium **AND** polyethylene glycol **AND** bisacodyl **AND** bisacodyl    guaiFENesin-dextromethorphan    HYDROcodone-acetaminophen    hydrOXYzine    ipratropium-albuterol    magnesium sulfate **OR** magnesium sulfate in D5W 1g/100mL (PREMIX)    melatonin    nitroglycerin    ondansetron **OR** ondansetron    potassium chloride    potassium chloride    [COMPLETED] Insert peripheral IV **AND** sodium chloride    sodium chloride    SUMAtriptan    ASSESSMENT:  Pneumonia  A-fib  Acute kidney injury   Depression  Hx migraine headaches    PLAN:  Antibiotics  Bronchodilators  Inhaled corticosteroids  Incentive spirometer  Electrolytes/ glycemic  control  DVT and GI prophylaxis    Discussed with Dr Tawana Zarate, GUZMAN  11/4/2022  21:14 EDT      I personally have examined  and interviewed the patient. I have reviewed the history, data, problems, assessment and plan with our NP.  Total Critical care time in direct medical management (   ) minutes, This time specifically excludes time spent performing procedures.    Lay Gilliam MD   11/4/2022  22:36 EDT

## 2022-11-05 NOTE — PLAN OF CARE
Goal Outcome Evaluation:   Pt rested quietly through the night.  Pt c/o  back pain and prn Narco given and pt states pain med effective.  Will continue to monitor pt status.

## 2022-11-05 NOTE — PLAN OF CARE
Goal Outcome Evaluation:  Plan of Care Reviewed With: patient, daughter        Progress: no change  Outcome Evaluation: Pt doing well today, still c/o headache and weakness. Vitals stable. Still receiving IV abx. Will continue to monitor.

## 2022-11-05 NOTE — PROGRESS NOTES
[facility] Medicine Services Daily Progress Note    Patient Name: Andressa Marks  : 1960  MRN: [id number]  Primary Care Physician:  Provider, No Known  Date of admission: 2022      Subjective      CRP is trending down but still remains elevated.  She reports that she does not feel well enough to go home.  Hopeful discharge in the next 24 hours    Objective      Vitals:   Temp:  [98.8 °F (37.1 °C)] 98.8 °F (37.1 °C)  Heart Rate:  [58-78] 66  Resp:  [18-20] 18  BP: (113-169)/(63-84) 155/84    PHYSICAL EXAM:     Constitutional:       Appearance: Appears unwell but not toxic  HENT:      Head: Normocephalic and atraumatic.   Eyes:      Extraocular Movements: Extraocular movements intact.      Pupils: Pupils are equal, round, and reactive to light.   Cardiovascular:      Rate and Rhythm: Normal rate and regular rhythm.      Pulses: Normal pulses.      Heart sounds: Normal heart sounds.   Pulmonary:     Lungs are clear to auscultation bilaterally; no wheezes appreciated  Abdominal:      General: Abdomen is flat. Bowel sounds are normal. There is no distension.   Musculoskeletal:      Cervical back: Normal range of motion and neck supple.      Right lower leg: No edema.      Left lower leg: No edema.   Skin:     General: Skin is warm.   Neurological:      General: No focal deficit present.      Mental Status: He is alert and oriented to person, place, and time.   Psychiatric:         Mood and Affect: Mood normal.         Behavior: Behavior normal.         Assessment & Plan    Pneumonia, multifocal  Human metapneumovirus   - Initial CT chest without contrast showing patchy groundglass opacities within the bilateral lower lobes  - Respiratory viral panel positive for human metapneumovirus  - Repeat CT angiography of the chest showing edema and small bilateral pleural effusions  - Pulmonary consulted  - Holding IV fluids for now and may benefit from some diuresis and/oral steroids     IRENE,  resolved   - Trend BMP  - Was seen by nephrology who subsequently signed off     A-fib, stable   -Continue metoprolol     Hypertension   Hyperlipidemia  Migraines  - Continue  home meds    DVT prophylaxis:  Medical DVT prophylaxis orders are present.    CODE STATUS:    Level Of Support Discussed With: Patient  Code Status (Patient has no pulse and is not breathing): CPR (Attempt to Resuscitate)  Medical Interventions (Patient has pulse or is breathing): Full Support      Expected length of stay: TBD    Electronically signed by Uche Siegel MD, 11/05/22, 10:34 EDT.  [facility] Hospitalist Team

## 2022-11-05 NOTE — PROGRESS NOTES
"PULMONARY CRITICAL CARE Progress  NOTE      PATIENT IDENTIFICATION:  Name: Andressa Marks  MRN: [id number]  :  1960     Age: 62 y.o.  Sex: female    DATE OF Note:  2022   Referring Physician: Blake Walker MD                  Subjective:   Feeling better, still some coughing ,no SOB, no chest or abdominal pain, no bowel or bladder issues reported    Objective:  tMax 24 hrs: Temp (24hrs), Av.7 °F (37.1 °C), Min:98.5 °F (36.9 °C), Max:98.8 °F (37.1 °C)      Vitals Ranges:   Temp:  [98.5 °F (36.9 °C)-98.8 °F (37.1 °C)] 98.5 °F (36.9 °C)  Heart Rate:  [56-78] 56  Resp:  [18-24] 24  BP: (105-169)/(58-84) 105/58    Intake and Output Last 3 Shifts:   I/O last 3 completed shifts:  In: 828 [P.O.:628; IV Piggyback:200]  Out: -     Exam:  /58 (BP Location: Right arm, Patient Position: Lying)   Pulse 56   Temp 98.5 °F (36.9 °C) (Oral)   Resp 24   Ht 162.6 cm (64\")   Wt 80 kg (176 lb 5.9 oz)   SpO2 98%   BMI 30.27 kg/m²     General Appearance: Alert  HEENT:  Normocephalic, without obvious abnormality, Conjunctivae/corneas clear.  Normal external ear canals, nares normal, no drainage     Neck:  Supple, symmetrical, trachea midline. No JVD.  Lungs /Chest wall:   Bilateral basal rhonchi improving, respirations unlabored, symmetrical wall movement.     Heart:  Regular rate and rhythm, systolic murmur PMI left sternal border  Abdomen: Soft, nontender, no masses, no organomegaly.    Extremities: Trace edema, no clubbing or cyanosis        Medications:    Current Facility-Administered Medications:   •  acetaminophen (TYLENOL) tablet 650 mg, 650 mg, Oral, Q4H PRN, 650 mg at 22 0915 **OR** acetaminophen (TYLENOL) 160 MG/5ML solution 650 mg, 650 mg, Oral, Q4H PRN **OR** acetaminophen (TYLENOL) suppository 650 mg, 650 mg, Rectal, Q4H PRN, Kelli Curiel PA-C  •  aluminum-magnesium hydroxide-simethicone (MAALOX MAX) 400-400-40 MG/5ML suspension 15 mL, 15 mL, Oral, Q6H PRN, Kelli Curiel PA-C  • "  atorvastatin (LIPITOR) tablet 40 mg, 40 mg, Oral, Nightly, Kelli Curiel PA-C, 40 mg at 11/04/22 2050  •  sennosides-docusate (PERICOLACE) 8.6-50 MG per tablet 2 tablet, 2 tablet, Oral, BID, 2 tablet at 11/05/22 0911 **AND** polyethylene glycol (MIRALAX) packet 17 g, 17 g, Oral, Daily PRN **AND** bisacodyl (DULCOLAX) EC tablet 5 mg, 5 mg, Oral, Daily PRN **AND** bisacodyl (DULCOLAX) suppository 10 mg, 10 mg, Rectal, Daily PRN, Kelli Curiel PA-C  •  budesonide (PULMICORT) nebulizer solution 0.5 mg, 0.5 mg, Nebulization, BID - RT, Uche Siegel MD, 0.5 mg at 11/05/22 0731  •  busPIRone (BUSPAR) tablet 15 mg, 15 mg, Oral, TID, Kelli Curiel PA-C, 15 mg at 11/05/22 0911  •  cefepime 2 gm IVPB in 100 ml NS (MBP), 2 g, Intravenous, BID, Uche Siegel MD, 2 g at 11/05/22 0911  •  doxycycline (VIBRAMYCIN) 100 mg in sodium chloride 0.9 % 100 mL IVPB, 100 mg, Intravenous, Q12H, Krystle Zarate, APRN, 100 mg at 11/05/22 1013  •  escitalopram (LEXAPRO) tablet 20 mg, 20 mg, Oral, Daily, Kelli Curiel PA-C, 20 mg at 11/05/22 0912  •  guaiFENesin-dextromethorphan (ROBITUSSIN DM) 100-10 MG/5ML syrup 5 mL, 5 mL, Oral, Q4H PRN, Uche Siegel MD, 5 mL at 11/03/22 2152  •  heparin (porcine) 5000 UNIT/ML injection 5,000 Units, 5,000 Units, Subcutaneous, Q8H, Uche Siegel MD, 5,000 Units at 11/05/22 0503  •  hydroCHLOROthiazide (HYDRODIURIL) tablet 25 mg, 25 mg, Oral, Daily, Uche Siegel MD, 25 mg at 11/05/22 0912  •  HYDROcodone-acetaminophen (NORCO) 5-325 MG per tablet 1 tablet, 1 tablet, Oral, Q4H PRN, Blake Walker MD, 1 tablet at 11/05/22 0911  •  hydrOXYzine (ATARAX) tablet 25 mg, 25 mg, Oral, TID PRN, Kelli Curiel PA-C  •  ipratropium-albuterol (DUO-NEB) nebulizer solution 3 mL, 3 mL, Nebulization, Q4H PRN, Kelli Curiel PARAVI, 3 mL at 11/04/22 0723  •  ipratropium-albuterol (DUO-NEB) nebulizer solution 3 mL, 3 mL, Nebulization, Q6H While Awake - RT, Uche Siegel MD, 3 mL at  11/05/22 1102  •  Magnesium Sulfate 2 gram infusion - Mg less than or equal to 1.5 mg/dL, 2 g, Intravenous, PRN **OR** Magnesium Sulfate 1 gram infusion - Mg 1.6-1.9 mg/dL, 1 g, Intravenous, PRN, Kelli Curiel PA-C  •  melatonin tablet 5 mg, 5 mg, Oral, Nightly PRN, Kelli Curiel PA-C  •  metoprolol tartrate (LOPRESSOR) tablet 75 mg, 75 mg, Oral, BID, Kelli Curiel PA-C, 75 mg at 11/05/22 0911  •  nitroglycerin (NITROSTAT) SL tablet 0.4 mg, 0.4 mg, Sublingual, Q5 Min PRN, Kelli Curiel PA-C  •  ondansetron (ZOFRAN) tablet 4 mg, 4 mg, Oral, Q6H PRN **OR** ondansetron (ZOFRAN) injection 4 mg, 4 mg, Intravenous, Q6H PRN, Kelli Curiel PA-C, 4 mg at 11/03/22 2103  •  potassium chloride (K-DUR,KLOR-CON) CR tablet 40 mEq, 40 mEq, Oral, PRN, Kelli Curiel PA-C  •  potassium chloride (KLOR-CON) packet 40 mEq, 40 mEq, Oral, PRN, Kelli Curiel PA-C  •  [COMPLETED] Insert peripheral IV, , , Once **AND** sodium chloride 0.9 % flush 10 mL, 10 mL, Intravenous, PRN, Wilmer Rodrigues MD  •  sodium chloride 0.9 % flush 10 mL, 10 mL, Intravenous, Q12H, Kelli Curiel PA-C, 10 mL at 11/05/22 0912  •  sodium chloride 0.9 % flush 10 mL, 10 mL, Intravenous, PRN, Kelli Curiel PA-C  •  SUMAtriptan (IMITREX) tablet 50 mg, 50 mg, Oral, Q2H PRN, Kelli Curiel PA-C, 50 mg at 11/03/22 1557  •  topiramate (TOPAMAX) tablet 50 mg, 50 mg, Oral, BID, Kelli Curiel PA-C, 50 mg at 11/05/22 0911    Data Review:  All labs (24hrs):   Recent Results (from the past 24 hour(s))   Magnesium    Collection Time: 11/05/22 12:57 AM    Specimen: Blood   Result Value Ref Range    Magnesium 1.8 1.6 - 2.4 mg/dL   C-reactive Protein    Collection Time: 11/05/22 12:57 AM    Specimen: Blood   Result Value Ref Range    C-Reactive Protein 10.90 (H) 0.00 - 0.50 mg/dL        Imaging:  XR Chest PA & Lateral  Narrative: DATE OF EXAM:  11/4/2022 2:18 PM     PROCEDURE:  XR CHEST PA AND LATERAL-     INDICATIONS:  edema? PNA?; N17.9-Acute kidney  failure, unspecified;  J18.0-Bronchopneumonia, unspecified organism     COMPARISON:  No Comparisons Available     TECHNIQUE:   Two radiologic views of the chest , PA and lateral were obtained.     FINDINGS:  Cardiomegaly. Pulmonary vasculature appears within normal limits. Lungs  clear other than atelectasis in the bases. Costophrenic angles sharp.  Staples in the lingula.      Impression: No radiographic findings of pulmonary edema. No acute process  demonstrated     Electronically Signed By-Ross Wyatt On:11/4/2022 2:56 PM  This report was finalized on [id number] by  Ross Wyatt, .       ASSESSMENT:  Pneumonia  A-fib  Acute kidney injury   Depression  Hx migraine headaches       PLAN:  Encourage to be out of bed daily  PT/OT  Antibiotics  Bronchodilator  Inhaled corticosteroids  Incentive spirometer  Electrolytes/ glycemic control  DVT and GI prophylaxis.    Discussed with Dr. Tawana Zarate, APRN   11/5/2022  11:50 EDT     I personally have examined  and interviewed the patient. I have reviewed the history, data, problems, assessment and plan with our NP.  Total Critical care time in direct medical management (   ) minutes, This time specifically excludes time spent performing procedures.    Lay Gilliam MD   11/5/2022  21:18 EDT

## 2022-11-06 LAB
BACTERIA SPEC AEROBE CULT: NORMAL
BACTERIA SPEC AEROBE CULT: NORMAL
CRP SERPL-MCNC: 5.24 MG/DL (ref 0–0.5)
MAGNESIUM SERPL-MCNC: 1.7 MG/DL (ref 1.6–2.4)

## 2022-11-06 PROCEDURE — 83735 ASSAY OF MAGNESIUM: CPT | Performed by: PHYSICIAN ASSISTANT

## 2022-11-06 PROCEDURE — 86140 C-REACTIVE PROTEIN: CPT | Performed by: HOSPITALIST

## 2022-11-06 PROCEDURE — 94799 UNLISTED PULMONARY SVC/PX: CPT

## 2022-11-06 PROCEDURE — 25010000002 CEFEPIME PER 500 MG: Performed by: HOSPITALIST

## 2022-11-06 PROCEDURE — 94664 DEMO&/EVAL PT USE INHALER: CPT

## 2022-11-06 PROCEDURE — 25010000002 HEPARIN (PORCINE) PER 1000 UNITS: Performed by: HOSPITALIST

## 2022-11-06 PROCEDURE — 63710000001 PREDNISONE PER 1 MG: Performed by: HOSPITALIST

## 2022-11-06 PROCEDURE — 94760 N-INVAS EAR/PLS OXIMETRY 1: CPT

## 2022-11-06 PROCEDURE — 80048 BASIC METABOLIC PNL TOTAL CA: CPT | Performed by: STUDENT IN AN ORGANIZED HEALTH CARE EDUCATION/TRAINING PROGRAM

## 2022-11-06 PROCEDURE — 84100 ASSAY OF PHOSPHORUS: CPT | Performed by: STUDENT IN AN ORGANIZED HEALTH CARE EDUCATION/TRAINING PROGRAM

## 2022-11-06 PROCEDURE — 94761 N-INVAS EAR/PLS OXIMETRY MLT: CPT

## 2022-11-06 PROCEDURE — 36415 COLL VENOUS BLD VENIPUNCTURE: CPT | Performed by: HOSPITALIST

## 2022-11-06 RX ORDER — PREDNISONE 20 MG/1
40 TABLET ORAL
Status: DISCONTINUED | OUTPATIENT
Start: 2022-11-06 | End: 2022-11-08 | Stop reason: HOSPADM

## 2022-11-06 RX ADMIN — TOPIRAMATE 50 MG: 25 TABLET, FILM COATED ORAL at 20:46

## 2022-11-06 RX ADMIN — BUSPIRONE HYDROCHLORIDE 15 MG: 15 TABLET ORAL at 17:18

## 2022-11-06 RX ADMIN — Medication 10 ML: at 08:34

## 2022-11-06 RX ADMIN — IPRATROPIUM BROMIDE AND ALBUTEROL SULFATE 3 ML: .5; 3 SOLUTION RESPIRATORY (INHALATION) at 11:22

## 2022-11-06 RX ADMIN — ATORVASTATIN CALCIUM 40 MG: 40 TABLET, FILM COATED ORAL at 20:46

## 2022-11-06 RX ADMIN — IPRATROPIUM BROMIDE AND ALBUTEROL SULFATE 3 ML: .5; 3 SOLUTION RESPIRATORY (INHALATION) at 07:03

## 2022-11-06 RX ADMIN — HYDROCODONE BITARTRATE AND ACETAMINOPHEN 1 TABLET: 5; 325 TABLET ORAL at 05:17

## 2022-11-06 RX ADMIN — BUSPIRONE HYDROCHLORIDE 15 MG: 15 TABLET ORAL at 08:34

## 2022-11-06 RX ADMIN — DOXYCYCLINE 100 MG: 100 INJECTION, POWDER, LYOPHILIZED, FOR SOLUTION INTRAVENOUS at 11:06

## 2022-11-06 RX ADMIN — HEPARIN SODIUM 5000 UNITS: 5000 INJECTION INTRAVENOUS; SUBCUTANEOUS at 22:20

## 2022-11-06 RX ADMIN — SENNOSIDES AND DOCUSATE SODIUM 2 TABLET: 50; 8.6 TABLET ORAL at 20:46

## 2022-11-06 RX ADMIN — Medication 10 ML: at 20:50

## 2022-11-06 RX ADMIN — HYDROCHLOROTHIAZIDE 25 MG: 25 TABLET ORAL at 08:34

## 2022-11-06 RX ADMIN — BUDESONIDE 0.5 MG: 0.5 INHALANT RESPIRATORY (INHALATION) at 20:10

## 2022-11-06 RX ADMIN — HYDROCODONE BITARTRATE AND ACETAMINOPHEN 1 TABLET: 5; 325 TABLET ORAL at 17:18

## 2022-11-06 RX ADMIN — BUSPIRONE HYDROCHLORIDE 15 MG: 15 TABLET ORAL at 20:46

## 2022-11-06 RX ADMIN — TOPIRAMATE 50 MG: 25 TABLET, FILM COATED ORAL at 08:34

## 2022-11-06 RX ADMIN — METOPROLOL TARTRATE 75 MG: 50 TABLET, FILM COATED ORAL at 08:34

## 2022-11-06 RX ADMIN — SENNOSIDES AND DOCUSATE SODIUM 2 TABLET: 50; 8.6 TABLET ORAL at 08:34

## 2022-11-06 RX ADMIN — HEPARIN SODIUM 5000 UNITS: 5000 INJECTION INTRAVENOUS; SUBCUTANEOUS at 05:18

## 2022-11-06 RX ADMIN — CEFEPIME 2 G: 2 INJECTION, POWDER, FOR SOLUTION INTRAVENOUS at 20:50

## 2022-11-06 RX ADMIN — DOXYCYCLINE 100 MG: 100 INJECTION, POWDER, LYOPHILIZED, FOR SOLUTION INTRAVENOUS at 22:20

## 2022-11-06 RX ADMIN — PREDNISONE 40 MG: 20 TABLET ORAL at 14:25

## 2022-11-06 RX ADMIN — CEFEPIME 2 G: 2 INJECTION, POWDER, FOR SOLUTION INTRAVENOUS at 08:33

## 2022-11-06 RX ADMIN — IPRATROPIUM BROMIDE AND ALBUTEROL SULFATE 3 ML: .5; 3 SOLUTION RESPIRATORY (INHALATION) at 20:10

## 2022-11-06 RX ADMIN — HEPARIN SODIUM 5000 UNITS: 5000 INJECTION INTRAVENOUS; SUBCUTANEOUS at 14:25

## 2022-11-06 RX ADMIN — ESCITALOPRAM OXALATE 20 MG: 10 TABLET ORAL at 08:34

## 2022-11-06 RX ADMIN — METOPROLOL TARTRATE 75 MG: 50 TABLET, FILM COATED ORAL at 20:46

## 2022-11-06 RX ADMIN — BUDESONIDE 0.5 MG: 0.5 INHALANT RESPIRATORY (INHALATION) at 07:03

## 2022-11-06 NOTE — PROGRESS NOTES
"PULMONARY CRITICAL CARE Progress  NOTE      PATIENT IDENTIFICATION:  Name: Andressa Marks  MRN: [id number]  :  1960     Age: 62 y.o.  Sex: female    DATE OF Note:  2022   Referring Physician: Blake Walker MD                  Subjective:   Feeling better, sitting on side of bed, still some coughing ,no SOB, no chest or abdominal pain, no bowel or bladder issues reported    Objective:  tMax 24 hrs: Temp (24hrs), Av.9 °F (37.2 °C), Min:98.8 °F (37.1 °C), Max:99 °F (37.2 °C)      Vitals Ranges:   Temp:  [98.8 °F (37.1 °C)-99 °F (37.2 °C)] 99 °F (37.2 °C)  Heart Rate:  [62-79] 64  Resp:  [14-22] 14  BP: (134-166)/(66-80) 149/69    Intake and Output Last 3 Shifts:   I/O last 3 completed shifts:  In: 1020 [P.O.:720; IV Piggyback:300]  Out: -     Exam:  /69 (BP Location: Left arm, Patient Position: Lying)   Pulse 64   Temp 99 °F (37.2 °C) (Oral)   Resp 14   Ht 162.6 cm (64\")   Wt 79.7 kg (175 lb 11.3 oz)   SpO2 97%   BMI 30.16 kg/m²     General Appearance: Alert  HEENT:  Normocephalic, without obvious abnormality, Conjunctivae/corneas clear.  Normal external ear canals, nares normal, no drainage     Neck:  Supple, symmetrical, trachea midline. No JVD.  Lungs /Chest wall:   Bilateral basal rhonchi improving, respirations unlabored, symmetrical wall movement.     Heart:  Regular rate and rhythm, systolic murmur PMI left sternal border  Abdomen: Soft, nontender, no masses, no organomegaly.    Extremities: Trace edema, no clubbing or cyanosis        Medications:    Current Facility-Administered Medications:     acetaminophen (TYLENOL) tablet 650 mg, 650 mg, Oral, Q4H PRN, 650 mg at 22 0915 **OR** acetaminophen (TYLENOL) 160 MG/5ML solution 650 mg, 650 mg, Oral, Q4H PRN **OR** acetaminophen (TYLENOL) suppository 650 mg, 650 mg, Rectal, Q4H PRN, O'Migel, Kelli PERALES, BOB    aluminum-magnesium hydroxide-simethicone (MAALOX MAX) 400-400-40 MG/5ML suspension 15 mL, 15 mL, Oral, Q6H PRN, O'Migel, " Kelli PERALES PA-C    atorvastatin (LIPITOR) tablet 40 mg, 40 mg, Oral, Nightly, JAYLAN'Kelli Lainez PA-C, 40 mg at 11/05/22 2101    sennosides-docusate (PERICOLACE) 8.6-50 MG per tablet 2 tablet, 2 tablet, Oral, BID, 2 tablet at 11/06/22 0834 **AND** polyethylene glycol (MIRALAX) packet 17 g, 17 g, Oral, Daily PRN **AND** bisacodyl (DULCOLAX) EC tablet 5 mg, 5 mg, Oral, Daily PRN **AND** bisacodyl (DULCOLAX) suppository 10 mg, 10 mg, Rectal, Daily PRN, Kelli Curiel PA-C    budesonide (PULMICORT) nebulizer solution 0.5 mg, 0.5 mg, Nebulization, BID - RT, Uche Siegel MD, 0.5 mg at 11/06/22 0703    busPIRone (BUSPAR) tablet 15 mg, 15 mg, Oral, TID, JAYLAN'Kelli Lainez PA-C, 15 mg at 11/06/22 0834    cefepime 2 gm IVPB in 100 ml NS (MBP), 2 g, Intravenous, BID, Uche Siegel MD, 2 g at 11/06/22 0833    doxycycline (VIBRAMYCIN) 100 mg in sodium chloride 0.9 % 100 mL IVPB, 100 mg, Intravenous, Q12H, Krystle Zarate, APRN, 100 mg at 11/06/22 1106    escitalopram (LEXAPRO) tablet 20 mg, 20 mg, Oral, Daily, Kelli Curiel PA-C, 20 mg at 11/06/22 0834    guaiFENesin-dextromethorphan (ROBITUSSIN DM) 100-10 MG/5ML syrup 5 mL, 5 mL, Oral, Q4H PRN, Uche Siegel MD, 5 mL at 11/03/22 2152    heparin (porcine) 5000 UNIT/ML injection 5,000 Units, 5,000 Units, Subcutaneous, Q8H, Uche Siegel MD, 5,000 Units at 11/06/22 0518    hydroCHLOROthiazide (HYDRODIURIL) tablet 25 mg, 25 mg, Oral, Daily, Uche Siegel MD, 25 mg at 11/06/22 0834    HYDROcodone-acetaminophen (NORCO) 5-325 MG per tablet 1 tablet, 1 tablet, Oral, Q4H PRN, Blake Walker MD, 1 tablet at 11/06/22 0517    hydrOXYzine (ATARAX) tablet 25 mg, 25 mg, Oral, TID PRN, Kelli Curiel PA-C    ipratropium-albuterol (DUO-NEB) nebulizer solution 3 mL, 3 mL, Nebulization, Q4H PRN, Kelli Curiel PA-C, 3 mL at 11/04/22 0723    ipratropium-albuterol (DUO-NEB) nebulizer solution 3 mL, 3 mL, Nebulization, Q6H While Awake - RT, Uche Siegel MD, 3 mL at  11/06/22 1122    Magnesium Sulfate 2 gram infusion - Mg less than or equal to 1.5 mg/dL, 2 g, Intravenous, PRN **OR** Magnesium Sulfate 1 gram infusion - Mg 1.6-1.9 mg/dL, 1 g, Intravenous, PRN, O'Kelli Lainez, PA-C    melatonin tablet 5 mg, 5 mg, Oral, Nightly PRN, JAYLAN'Kelli Lainez PA-C    metoprolol tartrate (LOPRESSOR) tablet 75 mg, 75 mg, Oral, BID, O'Migel, Kelli PERALES, PA-C, 75 mg at 11/06/22 0834    nitroglycerin (NITROSTAT) SL tablet 0.4 mg, 0.4 mg, Sublingual, Q5 Min PRN, JAYLAN'Kelli Lainez PA-C    ondansetron (ZOFRAN) tablet 4 mg, 4 mg, Oral, Q6H PRN **OR** ondansetron (ZOFRAN) injection 4 mg, 4 mg, Intravenous, Q6H PRN, JAYLAN'Kelli Lainez PA-C, 4 mg at 11/03/22 2103    potassium chloride (K-DUR,KLOR-CON) CR tablet 40 mEq, 40 mEq, Oral, PRN, O'Kelli Lainez PA-C    potassium chloride (KLOR-CON) packet 40 mEq, 40 mEq, Oral, PRN, O'Kelli Lainez PA-C    [COMPLETED] Insert peripheral IV, , , Once **AND** sodium chloride 0.9 % flush 10 mL, 10 mL, Intravenous, PRN, Wilmer Rodrigues MD    sodium chloride 0.9 % flush 10 mL, 10 mL, Intravenous, Q12H, O'MigelKelli duffy PA-C, 10 mL at 11/06/22 0834    sodium chloride 0.9 % flush 10 mL, 10 mL, Intravenous, PRN, JAYLAN'Kelli Lainez PA-C    SUMAtriptan (IMITREX) tablet 50 mg, 50 mg, Oral, Q2H PRN, O'Kelli Lainez PA-C, 50 mg at 11/03/22 1557    topiramate (TOPAMAX) tablet 50 mg, 50 mg, Oral, BID, Kelli Curile, BOB, 50 mg at 11/06/22 0834    Data Review:  All labs (24hrs):   Recent Results (from the past 24 hour(s))   Magnesium    Collection Time: 11/06/22  1:05 AM    Specimen: Blood   Result Value Ref Range    Magnesium 1.7 1.6 - 2.4 mg/dL   C-reactive Protein    Collection Time: 11/06/22  1:05 AM    Specimen: Blood   Result Value Ref Range    C-Reactive Protein 5.24 (H) 0.00 - 0.50 mg/dL        Imaging:  XR Chest PA & Lateral  Narrative: DATE OF EXAM:  11/4/2022 2:18 PM     PROCEDURE:  XR CHEST PA AND LATERAL-     INDICATIONS:  edema? PNA?; N17.9-Acute kidney failure,  unspecified;  J18.0-Bronchopneumonia, unspecified organism     COMPARISON:  No Comparisons Available     TECHNIQUE:   Two radiologic views of the chest , PA and lateral were obtained.     FINDINGS:  Cardiomegaly. Pulmonary vasculature appears within normal limits. Lungs  clear other than atelectasis in the bases. Costophrenic angles sharp.  Staples in the lingula.      Impression: No radiographic findings of pulmonary edema. No acute process  demonstrated     Electronically Signed By-Ross Wyatt On:11/4/2022 2:56 PM  This report was finalized on [id number] by  Ross Wyatt, .       ASSESSMENT:  Pneumonia  A-fib  Acute kidney injury   Depression  Hx migraine headaches       PLAN:  Encouraged to cough and deep breathe throughout the day and use I-S   PT/OT  Antibiotics  Bronchodilator  Inhaled corticosteroids  Incentive spirometer  Electrolytes/ glycemic control  DVT and GI prophylaxis-Heparin    Discussed with Dr. Tawana Zarate, APRN   11/6/2022  12:51 EST       I personally have examined  and interviewed the patient. I have reviewed the history, data, problems, assessment and plan with our NP.  Total Critical care time in direct medical management (   ) minutes, This time specifically excludes time spent performing procedures.    Lay Gilliam MD   11/6/2022  21:32 EST

## 2022-11-06 NOTE — PLAN OF CARE
Patient stable throughout shift. Plan for US of neck d/t patient complaining of pain and mild swelling on the right side of her neck. She has been on room air throughout the shift. She was started on oral prednisone today as well. Patient is agreeable to the plan of care.           Problem: Hypertension Comorbidity  Goal: Blood Pressure in Desired Range  Outcome: Ongoing, Progressing     Problem: Breathing Pattern Ineffective  Goal: Effective Breathing Pattern  Outcome: Ongoing, Progressing     Problem: Infection  Goal: Absence of Infection Signs and Symptoms  Outcome: Ongoing, Progressing     Problem: Adult Inpatient Plan of Care  Goal: Plan of Care Review  Outcome: Ongoing, Progressing  Goal: Patient-Specific Goal (Individualized)  Outcome: Ongoing, Progressing  Goal: Absence of Hospital-Acquired Illness or Injury  Outcome: Ongoing, Progressing  Intervention: Identify and Manage Fall Risk  Recent Flowsheet Documentation  Taken 11/6/2022 1000 by Lu Vallejo RN  Safety Promotion/Fall Prevention: safety round/check completed  Taken 11/6/2022 0834 by Lu Vallejo RN  Safety Promotion/Fall Prevention: safety round/check completed  Intervention: Prevent Skin Injury  Recent Flowsheet Documentation  Taken 11/6/2022 0834 by Lu Vallejo RN  Body Position: position changed independently  Intervention: Prevent and Manage VTE (Venous Thromboembolism) Risk  Recent Flowsheet Documentation  Taken 11/6/2022 0834 by Lu Vallejo RN  Activity Management: activity adjusted per tolerance  Goal: Optimal Comfort and Wellbeing  Outcome: Ongoing, Progressing  Intervention: Provide Person-Centered Care  Recent Flowsheet Documentation  Taken 11/6/2022 0834 by Lu Vallejo RN  Trust Relationship/Rapport:   care explained   thoughts/feelings acknowledged   reassurance provided   questions encouraged  Goal: Readiness for Transition of Care  Outcome: Ongoing, Progressing     Problem: Fall Injury Risk  Goal:  Absence of Fall and Fall-Related Injury  Outcome: Ongoing, Progressing  Intervention: Promote Injury-Free Environment  Recent Flowsheet Documentation  Taken 11/6/2022 1000 by Lu Vallejo, RN  Safety Promotion/Fall Prevention: safety round/check completed  Taken 11/6/2022 0834 by Lu Vallejo, RN  Safety Promotion/Fall Prevention: safety round/check completed   Goal Outcome Evaluation:

## 2022-11-06 NOTE — PLAN OF CARE
Goal Outcome Evaluation:   Pt rested quietly through the night.  Pt c/o headache and prn Narco given.  Will continue to monitor pt status.

## 2022-11-06 NOTE — PROGRESS NOTES
[facility] Medicine Services Daily Progress Note    Patient Name: Andressa Marks  : 1960  MRN: [id number]  Primary Care Physician:  Provider, No Known  Date of admission: 2022      Subjective      CRP continuing to trend down.  She complains of some swelling in the right side of her neck.  I have ordered an ultrasound as well as some prednisone.    Objective      Vitals:   Temp:  [98.8 °F (37.1 °C)-99 °F (37.2 °C)] 99 °F (37.2 °C)  Heart Rate:  [62-79] 64  Resp:  [14-22] 14  BP: (134-166)/(66-80) 149/69    PHYSICAL EXAM:     Constitutional:       Appearance: Appears unwell but not toxic  HENT:      Head: Normocephalic and atraumatic.   Eyes:      Extraocular Movements: Extraocular movements intact.      Pupils: Pupils are equal, round, and reactive to light.   Cardiovascular:      Rate and Rhythm: Normal rate and regular rhythm.      Pulses: Normal pulses.      Heart sounds: Normal heart sounds.   Pulmonary:     Lungs are clear to auscultation bilaterally; no wheezes appreciated  Abdominal:      General: Abdomen is flat. Bowel sounds are normal. There is no distension.   Musculoskeletal:      Cervical back: Normal range of motion and neck supple.      Right lower leg: No edema.      Left lower leg: No edema.   Skin:     General: Skin is warm.   Neurological:      General: No focal deficit present.      Mental Status: He is alert and oriented to person, place, and time.   Psychiatric:         Mood and Affect: Mood normal.         Behavior: Behavior normal.         Assessment & Plan    Pneumonia, multifocal  Human metapneumovirus   - Initial CT chest without contrast showing patchy groundglass opacities within the bilateral lower lobes  - Respiratory viral panel positive for human metapneumovirus  - Repeat CT angiography of the chest showing edema and small bilateral pleural effusions  - Pulmonary consulted  - Holding IV fluids for now and may benefit from some diuresis and/oral  steroids    Right-sided neck swelling  -Ultrasound ordered  - Prednisone     IRENE, resolved   - Trend BMP  - Was seen by nephrology who subsequently signed off     A-fib, stable   -Continue metoprolol     Hypertension   Hyperlipidemia  Migraines  - Continue  home meds    DVT prophylaxis:  Medical DVT prophylaxis orders are present.    CODE STATUS:    Level Of Support Discussed With: Patient  Code Status (Patient has no pulse and is not breathing): CPR (Attempt to Resuscitate)  Medical Interventions (Patient has pulse or is breathing): Full Support      Expected length of stay: TBD    Electronically signed by Uche Siegel MD, 11/06/22, 12:58 EST.  [facility]ist Team

## 2022-11-07 ENCOUNTER — APPOINTMENT (OUTPATIENT)
Dept: ULTRASOUND IMAGING | Facility: HOSPITAL | Age: 62
End: 2022-11-07

## 2022-11-07 LAB
ANION GAP SERPL CALCULATED.3IONS-SCNC: 14 MMOL/L (ref 5–15)
BUN SERPL-MCNC: 21 MG/DL (ref 8–23)
BUN/CREAT SERPL: 21 (ref 7–25)
CALCIUM SPEC-SCNC: 8.9 MG/DL (ref 8.6–10.5)
CHLORIDE SERPL-SCNC: 107 MMOL/L (ref 98–107)
CO2 SERPL-SCNC: 21 MMOL/L (ref 22–29)
CREAT SERPL-MCNC: 1 MG/DL (ref 0.57–1)
CRP SERPL-MCNC: 2.98 MG/DL (ref 0–0.5)
EGFRCR SERPLBLD CKD-EPI 2021: 63 ML/MIN/1.73
GLUCOSE SERPL-MCNC: 202 MG/DL (ref 65–99)
MAGNESIUM SERPL-MCNC: 1.8 MG/DL (ref 1.6–2.4)
PHOSPHATE SERPL-MCNC: 2.4 MG/DL (ref 2.5–4.5)
POTASSIUM SERPL-SCNC: 4.1 MMOL/L (ref 3.5–5.2)
SODIUM SERPL-SCNC: 142 MMOL/L (ref 136–145)

## 2022-11-07 PROCEDURE — 63710000001 PREDNISONE PER 1 MG: Performed by: HOSPITALIST

## 2022-11-07 PROCEDURE — 94799 UNLISTED PULMONARY SVC/PX: CPT

## 2022-11-07 PROCEDURE — 76536 US EXAM OF HEAD AND NECK: CPT

## 2022-11-07 PROCEDURE — 25010000002 HEPARIN (PORCINE) PER 1000 UNITS: Performed by: HOSPITALIST

## 2022-11-07 RX ORDER — DOXYCYCLINE 100 MG/1
100 TABLET ORAL EVERY 12 HOURS SCHEDULED
Status: DISCONTINUED | OUTPATIENT
Start: 2022-11-07 | End: 2022-11-07

## 2022-11-07 RX ADMIN — HEPARIN SODIUM 5000 UNITS: 5000 INJECTION INTRAVENOUS; SUBCUTANEOUS at 13:47

## 2022-11-07 RX ADMIN — HYDROCHLOROTHIAZIDE 25 MG: 25 TABLET ORAL at 09:52

## 2022-11-07 RX ADMIN — SENNOSIDES AND DOCUSATE SODIUM 2 TABLET: 50; 8.6 TABLET ORAL at 09:52

## 2022-11-07 RX ADMIN — GUAIFENESIN SYRUP AND DEXTROMETHORPHAN 5 ML: 100; 10 SYRUP ORAL at 15:44

## 2022-11-07 RX ADMIN — ESCITALOPRAM OXALATE 20 MG: 10 TABLET ORAL at 09:51

## 2022-11-07 RX ADMIN — BUSPIRONE HYDROCHLORIDE 15 MG: 15 TABLET ORAL at 09:52

## 2022-11-07 RX ADMIN — ATORVASTATIN CALCIUM 40 MG: 40 TABLET, FILM COATED ORAL at 20:25

## 2022-11-07 RX ADMIN — METOPROLOL TARTRATE 75 MG: 50 TABLET, FILM COATED ORAL at 09:52

## 2022-11-07 RX ADMIN — PREDNISONE 40 MG: 20 TABLET ORAL at 09:51

## 2022-11-07 RX ADMIN — HYDROCODONE BITARTRATE AND ACETAMINOPHEN 1 TABLET: 5; 325 TABLET ORAL at 06:29

## 2022-11-07 RX ADMIN — BUSPIRONE HYDROCHLORIDE 15 MG: 15 TABLET ORAL at 15:41

## 2022-11-07 RX ADMIN — TOPIRAMATE 50 MG: 25 TABLET, FILM COATED ORAL at 20:25

## 2022-11-07 RX ADMIN — GUAIFENESIN SYRUP AND DEXTROMETHORPHAN 5 ML: 100; 10 SYRUP ORAL at 21:41

## 2022-11-07 RX ADMIN — DOXYCYCLINE 100 MG: 100 TABLET ORAL at 10:00

## 2022-11-07 RX ADMIN — Medication 10 ML: at 20:25

## 2022-11-07 RX ADMIN — HEPARIN SODIUM 5000 UNITS: 5000 INJECTION INTRAVENOUS; SUBCUTANEOUS at 06:03

## 2022-11-07 RX ADMIN — METOPROLOL TARTRATE 75 MG: 50 TABLET, FILM COATED ORAL at 20:25

## 2022-11-07 RX ADMIN — TOPIRAMATE 50 MG: 25 TABLET, FILM COATED ORAL at 09:51

## 2022-11-07 RX ADMIN — Medication 10 ML: at 09:53

## 2022-11-07 RX ADMIN — HYDROCODONE BITARTRATE AND ACETAMINOPHEN 1 TABLET: 5; 325 TABLET ORAL at 15:44

## 2022-11-07 RX ADMIN — HEPARIN SODIUM 5000 UNITS: 5000 INJECTION INTRAVENOUS; SUBCUTANEOUS at 21:41

## 2022-11-07 RX ADMIN — BUSPIRONE HYDROCHLORIDE 15 MG: 15 TABLET ORAL at 20:25

## 2022-11-07 NOTE — PAYOR COMM NOTE
"RE: [id number]    CLINICAL UPDATE:    ============================    UTILIZATION REVIEW  DARBY CRAIN RN   PH: [phone]  FAX: [phone]    [facility]  NPI# [id number]  TID # [id number]    ============================    11/7- US NECK PDG  LABS: CRP 2.98,   MEDS: PO  NOT SEEN YET TODAY BY ATTENDING        Andressa Marks (62 y.o. Female)     Date of Birth   1960    Social Security Number       Address   [address] [place] [address]    Home Phone   [phone]    MRN   [id number]       [employer]    Marital Status                               Admission Date   11/1/22    Admission Type   Emergency    Admitting Provider   Blake Walker MD    Attending Provider   Dionisio Oneal MD    Department, Room/Bed   [facility] 3C MEDICAL INPATIENT, 361/1       Discharge Date       Discharge Disposition       Discharge Destination                               Attending Provider: Dionisio Oneal MD    Allergies: Penicillins    Isolation: Contact   Infection: Human Metapneumovirus  (11/02/22)   Code Status: CPR    Ht: 162.6 cm (64\")   Wt: 79.3 kg (174 lb 13.2 oz)    Admission Cmt: None   Principal Problem: Pneumonia [J18.9]                 Active Insurance as of 11/1/2022     Primary Coverage     Payor Plan Insurance Group Employer/Plan Group    [facility] BY MCNAIR [facility] BY TABBY [id number]     Payor Plan Address Payor Plan Phone Number Payor Plan Fax Number Effective Dates    [address] [address]   1/1/2021 - None Entered    [facility] [id number]       Subscriber Name Subscriber Birth Date Member ID       ANDRESSA MARKS 1960 [id number]                 Emergency Contacts      (Rel.) Home Phone Work Phone Mobile Phone    ZI LINDSEY (Daughter) -- -- [phone]               Physician Progress Notes (last 48 hours)      Hu Lopez MD at 11/07/22 1020          Daily Progress " Note          Assessment      Pneumonia  A-fib  Acute kidney injury   Depression  Hx migraine headaches        PLAN:  Encouraged to cough and deep breathe throughout the day and use I-S.  Oxygenating well on room air  PT/OT  Antibiotics  Bronchodilator  Inhaled corticosteroids  Incentive spirometer  Prednisone  Electrolytes/ glycemic control  DVT and GI prophylaxis-Heparin       LOS: 4 days     Subjective     Reports improvement in the shortness of breath    Objective     Vital signs for last 24 hours:  Vitals:    11/06/22 2350 11/07/22 0402 11/07/22 0743 11/07/22 0949   BP: 169/72 166/67  172/80   BP Location: Left arm Left arm  Left arm   Patient Position: Lying Lying  Lying   Pulse: 71 74  88   Resp: 18 18     Temp: 98.2 °F (36.8 °C) 97.9 °F (36.6 °C)     TempSrc: Oral Oral     SpO2: 95% 99% 98% 99%   Weight:  79.3 kg (174 lb 13.2 oz)     Height:           Intake/Output last 3 shifts:  I/O last 3 completed shifts:  In: 1180 [P.O.:1080; IV Piggyback:100]  Out: -   Intake/Output this shift:  I/O this shift:  In: 240 [P.O.:240]  Out: -       Radiology  Imaging Results (Last 24 Hours)     Procedure Component Value Units Date/Time    US Head Neck Soft Tissue [[id number]] Resulted: 11/07/22 0913     Updated: 11/07/22 0926          Labs:  Results from last 7 days   Lab Units 11/02/22  0808   WBC 10*3/mm3 5.60   HEMOGLOBIN g/dL 9.1*   HEMATOCRIT % 27.0*   PLATELETS 10*3/mm3 175     Results from last 7 days   Lab Units 11/04/22  0116 11/02/22  0808 11/01/22  1024   SODIUM mmol/L 140   < > 141   POTASSIUM mmol/L 4.4   < > 3.6   CHLORIDE mmol/L 107   < > 103   CO2 mmol/L 20.0*   < > 17.0*   BUN mg/dL 18   < > 50*   CREATININE mg/dL 0.99   < > 2.77*   CALCIUM mg/dL 8.6   < > 9.0   BILIRUBIN mg/dL  --   --  0.4   ALK PHOS U/L  --   --  92   ALT (SGPT) U/L  --   --  12   AST (SGOT) U/L  --   --  21   GLUCOSE mg/dL 117*   < > 116*    < > = values in this interval not displayed.         Results from last 7 days   Lab Units  11/03/22  0050 11/01/22  1024   ALBUMIN g/dL 3.20* 4.10     Results from last 7 days   Lab Units 11/01/22  1024   TROPONIN T ng/mL <0.010         Results from last 7 days   Lab Units 11/06/22  2255   MAGNESIUM mg/dL 1.8                   Meds:   SCHEDULE  atorvastatin, 40 mg, Oral, Nightly  budesonide, 0.5 mg, Nebulization, BID - RT  busPIRone, 15 mg, Oral, TID  doxycycline, 100 mg, Oral, Q12H  escitalopram, 20 mg, Oral, Daily  heparin (porcine), 5,000 Units, Subcutaneous, Q8H  hydroCHLOROthiazide, 25 mg, Oral, Daily  ipratropium-albuterol, 3 mL, Nebulization, Q6H While Awake - RT  metoprolol tartrate, 75 mg, Oral, BID  predniSONE, 40 mg, Oral, Daily With Breakfast  senna-docusate sodium, 2 tablet, Oral, BID  sodium chloride, 10 mL, Intravenous, Q12H  topiramate, 50 mg, Oral, BID      Infusions     PRNs  •  acetaminophen **OR** acetaminophen **OR** acetaminophen  •  aluminum-magnesium hydroxide-simethicone  •  senna-docusate sodium **AND** polyethylene glycol **AND** bisacodyl **AND** bisacodyl  •  guaiFENesin-dextromethorphan  •  HYDROcodone-acetaminophen  •  hydrOXYzine  •  ipratropium-albuterol  •  magnesium sulfate **OR** magnesium sulfate in D5W 1g/100mL (PREMIX)  •  melatonin  •  nitroglycerin  •  ondansetron **OR** ondansetron  •  potassium chloride  •  potassium chloride  •  [COMPLETED] Insert peripheral IV **AND** sodium chloride  •  sodium chloride  •  SUMAtriptan    Physical Exam:  General Appearance:  Alert   HEENT:  Normocephalic, without obvious abnormality, Conjunctiva/corneas clear,.   Nares normal, no drainage     Neck:  Supple, symmetrical, trachea midline.   Lungs /Chest wall: Mild bilateral basal rhonchi, respirations unlabored, symmetrical wall movement.     Heart:  Regular rate and rhythm, S1 S2 normal  Abdomen: Soft, non-tender, no masses, no organomegaly.    Extremities: No edema, no clubbing or cyanosis constitutional: Negative for     ROS  Constitutional: Negative for chills, fever and  malaise/fatigue.   HENT: Negative.    Eyes: Negative.    Cardiovascular: Negative.    Respiratory: Positive for improvement in the cough and shortness of breath.    Skin: Negative.    Musculoskeletal: Negative.    Gastrointestinal: Negative.    Genitourinary: Negative.    Neurological: Negative.    Psychiatric/Behavioral: Negative.      I reviewed the recent clinical results    Much of this encounter note is an electronic transcription/translation of spoken language to printed text using Dragon Software which might include inadvertent errors in transcription.      Electronically signed by Hu Lopez MD at 22 1023     Uche Siegel MD at 22 1258              [facility] Medicine Services Daily Progress Note    Patient Name: Andressa Marks  : 1960  MRN: [id number]  Primary Care Physician:  Provider, No Known  Date of admission: 2022      Subjective       CRP continuing to trend down.  She complains of some swelling in the right side of her neck.  I have ordered an ultrasound as well as some prednisone.    Objective       Vitals:   Temp:  [98.8 °F (37.1 °C)-99 °F (37.2 °C)] 99 °F (37.2 °C)  Heart Rate:  [62-79] 64  Resp:  [14-22] 14  BP: (134-166)/(66-80) 149/69    PHYSICAL EXAM:     Constitutional:       Appearance: Appears unwell but not toxic  HENT:      Head: Normocephalic and atraumatic.   Eyes:      Extraocular Movements: Extraocular movements intact.      Pupils: Pupils are equal, round, and reactive to light.   Cardiovascular:      Rate and Rhythm: Normal rate and regular rhythm.      Pulses: Normal pulses.      Heart sounds: Normal heart sounds.   Pulmonary:     Lungs are clear to auscultation bilaterally; no wheezes appreciated  Abdominal:      General: Abdomen is flat. Bowel sounds are normal. There is no distension.   Musculoskeletal:      Cervical back: Normal range of motion and neck supple.      Right lower leg: No edema.      Left lower leg: No edema.    Skin:     General: Skin is warm.   Neurological:      General: No focal deficit present.      Mental Status: He is alert and oriented to person, place, and time.   Psychiatric:         Mood and Affect: Mood normal.         Behavior: Behavior normal.         Assessment & Plan    Pneumonia, multifocal  Human metapneumovirus   - Initial CT chest without contrast showing patchy groundglass opacities within the bilateral lower lobes  - Respiratory viral panel positive for human metapneumovirus  - Repeat CT angiography of the chest showing edema and small bilateral pleural effusions  - Pulmonary consulted  - Holding IV fluids for now and may benefit from some diuresis and/oral steroids    Right-sided neck swelling  -Ultrasound ordered  - Prednisone     IRENE, resolved   - Trend BMP  - Was seen by nephrology who subsequently signed off     A-fib, stable   -Continue metoprolol     Hypertension   Hyperlipidemia  Migraines  - Continue  home meds    DVT prophylaxis:  Medical DVT prophylaxis orders are present.    CODE STATUS:    Level Of Support Discussed With: Patient  Code Status (Patient has no pulse and is not breathing): CPR (Attempt to Resuscitate)  Medical Interventions (Patient has pulse or is breathing): Full Support      Expected length of stay: TBD    Electronically signed by Uche Siegel MD, 22, 12:58 EST.  [facility] Hospitalist Team               Electronically signed by Uche Siegel MD at 22 1259     Lay Gilliam MD at 22 1251          PULMONARY CRITICAL CARE Progress  NOTE      PATIENT IDENTIFICATION:  Name: Andressa Marks  MRN: [id number]  :  1960     Age: 62 y.o.  Sex: female    DATE OF Note:  2022   Referring Physician: Blake Walker MD                  Subjective:   Feeling better, sitting on side of bed, still some coughing ,no SOB, no chest or abdominal pain, no bowel or bladder issues reported    Objective:  tMax 24 hrs: Temp (24hrs), Av.9 °F (37.2  "°C), Min:98.8 °F (37.1 °C), Max:99 °F (37.2 °C)      Vitals Ranges:   Temp:  [98.8 °F (37.1 °C)-99 °F (37.2 °C)] 99 °F (37.2 °C)  Heart Rate:  [62-79] 64  Resp:  [14-22] 14  BP: (134-166)/(66-80) 149/69    Intake and Output Last 3 Shifts:   I/O last 3 completed shifts:  In: 1020 [P.O.:720; IV Piggyback:300]  Out: -     Exam:  /69 (BP Location: Left arm, Patient Position: Lying)   Pulse 64   Temp 99 °F (37.2 °C) (Oral)   Resp 14   Ht 162.6 cm (64\")   Wt 79.7 kg (175 lb 11.3 oz)   SpO2 97%   BMI 30.16 kg/m²     General Appearance: Alert  HEENT:  Normocephalic, without obvious abnormality, Conjunctivae/corneas clear.  Normal external ear canals, nares normal, no drainage     Neck:  Supple, symmetrical, trachea midline. No JVD.  Lungs /Chest wall:   Bilateral basal rhonchi improving, respirations unlabored, symmetrical wall movement.     Heart:  Regular rate and rhythm, systolic murmur PMI left sternal border  Abdomen: Soft, nontender, no masses, no organomegaly.    Extremities: Trace edema, no clubbing or cyanosis        Medications:    Current Facility-Administered Medications:   •  acetaminophen (TYLENOL) tablet 650 mg, 650 mg, Oral, Q4H PRN, 650 mg at 11/04/22 0915 **OR** acetaminophen (TYLENOL) 160 MG/5ML solution 650 mg, 650 mg, Oral, Q4H PRN **OR** acetaminophen (TYLENOL) suppository 650 mg, 650 mg, Rectal, Q4H PRN, Kelli Curiel PA-C  •  aluminum-magnesium hydroxide-simethicone (MAALOX MAX) 400-400-40 MG/5ML suspension 15 mL, 15 mL, Oral, Q6H PRN, Kelli Curiel PA-C  •  atorvastatin (LIPITOR) tablet 40 mg, 40 mg, Oral, Nightly, Kelli Curiel PA-C, 40 mg at 11/05/22 2101  •  sennosides-docusate (PERICOLACE) 8.6-50 MG per tablet 2 tablet, 2 tablet, Oral, BID, 2 tablet at 11/06/22 0811 **AND** polyethylene glycol (MIRALAX) packet 17 g, 17 g, Oral, Daily PRN **AND** bisacodyl (DULCOLAX) EC tablet 5 mg, 5 mg, Oral, Daily PRN **AND** bisacodyl (DULCOLAX) suppository 10 mg, 10 mg, Rectal, Daily " PRN, Kelli Curiel PA-C  •  budesonide (PULMICORT) nebulizer solution 0.5 mg, 0.5 mg, Nebulization, BID - RT, Uche Siegel MD, 0.5 mg at 11/06/22 0703  •  busPIRone (BUSPAR) tablet 15 mg, 15 mg, Oral, TID, Kelli Curiel PA-C, 15 mg at 11/06/22 0834  •  cefepime 2 gm IVPB in 100 ml NS (MBP), 2 g, Intravenous, BID, Uche Siegel MD, 2 g at 11/06/22 0833  •  doxycycline (VIBRAMYCIN) 100 mg in sodium chloride 0.9 % 100 mL IVPB, 100 mg, Intravenous, Q12H, Krystle Zarate APRN, 100 mg at 11/06/22 1106  •  escitalopram (LEXAPRO) tablet 20 mg, 20 mg, Oral, Daily, Kelli Curiel PA-C, 20 mg at 11/06/22 0834  •  guaiFENesin-dextromethorphan (ROBITUSSIN DM) 100-10 MG/5ML syrup 5 mL, 5 mL, Oral, Q4H PRN, Uche Siegel MD, 5 mL at 11/03/22 2152  •  heparin (porcine) 5000 UNIT/ML injection 5,000 Units, 5,000 Units, Subcutaneous, Q8H, Uche Siegel MD, 5,000 Units at 11/06/22 0518  •  hydroCHLOROthiazide (HYDRODIURIL) tablet 25 mg, 25 mg, Oral, Daily, Uche Siegel MD, 25 mg at 11/06/22 0834  •  HYDROcodone-acetaminophen (NORCO) 5-325 MG per tablet 1 tablet, 1 tablet, Oral, Q4H PRN, Blake Walker MD, 1 tablet at 11/06/22 0517  •  hydrOXYzine (ATARAX) tablet 25 mg, 25 mg, Oral, TID PRN, Kelli Curiel PA-C  •  ipratropium-albuterol (DUO-NEB) nebulizer solution 3 mL, 3 mL, Nebulization, Q4H PRN, Kelli Curiel PA-C, 3 mL at 11/04/22 0723  •  ipratropium-albuterol (DUO-NEB) nebulizer solution 3 mL, 3 mL, Nebulization, Q6H While Awake - RT, Uche Siegel MD, 3 mL at 11/06/22 1122  •  Magnesium Sulfate 2 gram infusion - Mg less than or equal to 1.5 mg/dL, 2 g, Intravenous, PRN **OR** Magnesium Sulfate 1 gram infusion - Mg 1.6-1.9 mg/dL, 1 g, Intravenous, PRN, Kelli Curiel PA-C  •  melatonin tablet 5 mg, 5 mg, Oral, Nightly PRN, Kelli Curiel PA-C  •  metoprolol tartrate (LOPRESSOR) tablet 75 mg, 75 mg, Oral, BID, Kelli Curiel PA-C, 75 mg at 11/06/22 0834  •  nitroglycerin (NITROSTAT)  SL tablet 0.4 mg, 0.4 mg, Sublingual, Q5 Min PRN, Kelli Curiel PA-C  •  ondansetron (ZOFRAN) tablet 4 mg, 4 mg, Oral, Q6H PRN **OR** ondansetron (ZOFRAN) injection 4 mg, 4 mg, Intravenous, Q6H PRN, Kelli Curiel PA-C, 4 mg at 11/03/22 2103  •  potassium chloride (K-DUR,KLOR-CON) CR tablet 40 mEq, 40 mEq, Oral, PRN, Kelli Curiel PA-C  •  potassium chloride (KLOR-CON) packet 40 mEq, 40 mEq, Oral, PRN, Kelli Curiel PA-C  •  [COMPLETED] Insert peripheral IV, , , Once **AND** sodium chloride 0.9 % flush 10 mL, 10 mL, Intravenous, PRN, Wilmer Rodrigues MD  •  sodium chloride 0.9 % flush 10 mL, 10 mL, Intravenous, Q12H, Kelli Curiel PA-C, 10 mL at 11/06/22 0834  •  sodium chloride 0.9 % flush 10 mL, 10 mL, Intravenous, PRN, Kelli Curiel PA-C  •  SUMAtriptan (IMITREX) tablet 50 mg, 50 mg, Oral, Q2H PRN, Kelli Curiel PA-C, 50 mg at 11/03/22 1557  •  topiramate (TOPAMAX) tablet 50 mg, 50 mg, Oral, BID, Kelli Curiel PA-C, 50 mg at 11/06/22 0834    Data Review:  All labs (24hrs):   Recent Results (from the past 24 hour(s))   Magnesium    Collection Time: 11/06/22  1:05 AM    Specimen: Blood   Result Value Ref Range    Magnesium 1.7 1.6 - 2.4 mg/dL   C-reactive Protein    Collection Time: 11/06/22  1:05 AM    Specimen: Blood   Result Value Ref Range    C-Reactive Protein 5.24 (H) 0.00 - 0.50 mg/dL        Imaging:  XR Chest PA & Lateral  Narrative: DATE OF EXAM:  11/4/2022 2:18 PM     PROCEDURE:  XR CHEST PA AND LATERAL-     INDICATIONS:  edema? PNA?; N17.9-Acute kidney failure, unspecified;  J18.0-Bronchopneumonia, unspecified organism     COMPARISON:  No Comparisons Available     TECHNIQUE:   Two radiologic views of the chest , PA and lateral were obtained.     FINDINGS:  Cardiomegaly. Pulmonary vasculature appears within normal limits. Lungs  clear other than atelectasis in the bases. Costophrenic angles sharp.  Staples in the lingula.      Impression: No radiographic findings of pulmonary edema.  "No acute process  demonstrated     Electronically Signed By-Ross Wyatt On:2022 2:56 PM  This report was finalized on [id number] by  Ross Wyatt, .       ASSESSMENT:  Pneumonia  A-fib  Acute kidney injury   Depression  Hx migraine headaches       PLAN:  Encouraged to cough and deep breathe throughout the day and use I-S   PT/OT  Antibiotics  Bronchodilator  Inhaled corticosteroids  Incentive spirometer  Electrolytes/ glycemic control  DVT and GI prophylaxis-Heparin    Discussed with Dr. Tawana Zarate, GUZMAN   2022  12:51 EST       I personally have examined  and interviewed the patient. I have reviewed the history, data, problems, assessment and plan with our NP.  Total Critical care time in direct medical management (   ) minutes, This time specifically excludes time spent performing procedures.    Lay Gilliam MD   2022  21:32 EST         Electronically signed by Lay Gilliam MD at 22 2132     Lay Gilliam MD at 22 1150          PULMONARY CRITICAL CARE Progress  NOTE      PATIENT IDENTIFICATION:  Name: Andressa Marks  MRN: [id number]  :  1960     Age: 62 y.o.  Sex: female    DATE OF Note:  2022   Referring Physician: Blake Walker MD                  Subjective:   Feeling better, still some coughing ,no SOB, no chest or abdominal pain, no bowel or bladder issues reported    Objective:  tMax 24 hrs: Temp (24hrs), Av.7 °F (37.1 °C), Min:98.5 °F (36.9 °C), Max:98.8 °F (37.1 °C)      Vitals Ranges:   Temp:  [98.5 °F (36.9 °C)-98.8 °F (37.1 °C)] 98.5 °F (36.9 °C)  Heart Rate:  [56-78] 56  Resp:  [18-24] 24  BP: (105-169)/(58-84) 105/58    Intake and Output Last 3 Shifts:   I/O last 3 completed shifts:  In: 828 [P.O.:628; IV Piggyback:200]  Out: -     Exam:  /58 (BP Location: Right arm, Patient Position: Lying)   Pulse 56   Temp 98.5 °F (36.9 °C) (Oral)   Resp 24   Ht 162.6 cm (64\")   Wt 80 kg (176 lb 5.9 oz)   SpO2 98%   BMI 30.27 " kg/m²     General Appearance: Alert  HEENT:  Normocephalic, without obvious abnormality, Conjunctivae/corneas clear.  Normal external ear canals, nares normal, no drainage     Neck:  Supple, symmetrical, trachea midline. No JVD.  Lungs /Chest wall:   Bilateral basal rhonchi improving, respirations unlabored, symmetrical wall movement.     Heart:  Regular rate and rhythm, systolic murmur PMI left sternal border  Abdomen: Soft, nontender, no masses, no organomegaly.    Extremities: Trace edema, no clubbing or cyanosis        Medications:    Current Facility-Administered Medications:   •  acetaminophen (TYLENOL) tablet 650 mg, 650 mg, Oral, Q4H PRN, 650 mg at 11/04/22 0915 **OR** acetaminophen (TYLENOL) 160 MG/5ML solution 650 mg, 650 mg, Oral, Q4H PRN **OR** acetaminophen (TYLENOL) suppository 650 mg, 650 mg, Rectal, Q4H PRN, Kelli Curiel PA-C  •  aluminum-magnesium hydroxide-simethicone (MAALOX MAX) 400-400-40 MG/5ML suspension 15 mL, 15 mL, Oral, Q6H PRN, Kelli Curiel PA-C  •  atorvastatin (LIPITOR) tablet 40 mg, 40 mg, Oral, Nightly, Kelli Curiel PA-C, 40 mg at 11/04/22 2050  •  sennosides-docusate (PERICOLACE) 8.6-50 MG per tablet 2 tablet, 2 tablet, Oral, BID, 2 tablet at 11/05/22 0911 **AND** polyethylene glycol (MIRALAX) packet 17 g, 17 g, Oral, Daily PRN **AND** bisacodyl (DULCOLAX) EC tablet 5 mg, 5 mg, Oral, Daily PRN **AND** bisacodyl (DULCOLAX) suppository 10 mg, 10 mg, Rectal, Daily PRN, Kelli Curiel PA-C  •  budesonide (PULMICORT) nebulizer solution 0.5 mg, 0.5 mg, Nebulization, BID - RT, Uche Siegel MD, 0.5 mg at 11/05/22 0731  •  busPIRone (BUSPAR) tablet 15 mg, 15 mg, Oral, TID, Kelli Curiel PA-C, 15 mg at 11/05/22 0911  •  cefepime 2 gm IVPB in 100 ml NS (MBP), 2 g, Intravenous, BID, Uche Siegel MD, 2 g at 11/05/22 0911  •  doxycycline (VIBRAMYCIN) 100 mg in sodium chloride 0.9 % 100 mL IVPB, 100 mg, Intravenous, Q12H, Krystle Zarate APRN, 100 mg at 11/05/22 1013  •   escitalopram (LEXAPRO) tablet 20 mg, 20 mg, Oral, Daily, Kelli Curiel PA-C, 20 mg at 11/05/22 0912  •  guaiFENesin-dextromethorphan (ROBITUSSIN DM) 100-10 MG/5ML syrup 5 mL, 5 mL, Oral, Q4H PRN, Uche Siegel MD, 5 mL at 11/03/22 2152  •  heparin (porcine) 5000 UNIT/ML injection 5,000 Units, 5,000 Units, Subcutaneous, Q8H, Uche Siegel MD, 5,000 Units at 11/05/22 0503  •  hydroCHLOROthiazide (HYDRODIURIL) tablet 25 mg, 25 mg, Oral, Daily, Uche Siegel MD, 25 mg at 11/05/22 0912  •  HYDROcodone-acetaminophen (NORCO) 5-325 MG per tablet 1 tablet, 1 tablet, Oral, Q4H PRN, Blake Walekr MD, 1 tablet at 11/05/22 0911  •  hydrOXYzine (ATARAX) tablet 25 mg, 25 mg, Oral, TID PRN, Kelli Curiel PA-C  •  ipratropium-albuterol (DUO-NEB) nebulizer solution 3 mL, 3 mL, Nebulization, Q4H PRN, Kelli Curiel PA-C, 3 mL at 11/04/22 0723  •  ipratropium-albuterol (DUO-NEB) nebulizer solution 3 mL, 3 mL, Nebulization, Q6H While Awake - RT, Uche Siegel MD, 3 mL at 11/05/22 1102  •  Magnesium Sulfate 2 gram infusion - Mg less than or equal to 1.5 mg/dL, 2 g, Intravenous, PRN **OR** Magnesium Sulfate 1 gram infusion - Mg 1.6-1.9 mg/dL, 1 g, Intravenous, PRN, Kelli Curiel PA-C  •  melatonin tablet 5 mg, 5 mg, Oral, Nightly PRN, Kelli Curiel PA-C  •  metoprolol tartrate (LOPRESSOR) tablet 75 mg, 75 mg, Oral, BID, Kelli Curiel PA-C, 75 mg at 11/05/22 0911  •  nitroglycerin (NITROSTAT) SL tablet 0.4 mg, 0.4 mg, Sublingual, Q5 Min PRN, Kelli Curiel PA-C  •  ondansetron (ZOFRAN) tablet 4 mg, 4 mg, Oral, Q6H PRN **OR** ondansetron (ZOFRAN) injection 4 mg, 4 mg, Intravenous, Q6H PRN, Kelli Curiel PA-C, 4 mg at 11/03/22 2103  •  potassium chloride (K-DUR,KLOR-CON) CR tablet 40 mEq, 40 mEq, Oral, PRN, Kelli Curiel PA-C  •  potassium chloride (KLOR-CON) packet 40 mEq, 40 mEq, Oral, PRN, Kelli Curiel PA-C  •  [COMPLETED] Insert peripheral IV, , , Once **AND** sodium chloride 0.9 % flush 10  mL, 10 mL, Intravenous, PRN, Wilmer Rodrigues MD  •  sodium chloride 0.9 % flush 10 mL, 10 mL, Intravenous, Q12H, Kelli Curiel PA-C, 10 mL at 11/05/22 0912  •  sodium chloride 0.9 % flush 10 mL, 10 mL, Intravenous, PRN, Kelli Curiel PA-C  •  SUMAtriptan (IMITREX) tablet 50 mg, 50 mg, Oral, Q2H PRN, Kelli Curiel PA-C, 50 mg at 11/03/22 1557  •  topiramate (TOPAMAX) tablet 50 mg, 50 mg, Oral, BID, Kelli Curiel PA-C, 50 mg at 11/05/22 0911    Data Review:  All labs (24hrs):   Recent Results (from the past 24 hour(s))   Magnesium    Collection Time: 11/05/22 12:57 AM    Specimen: Blood   Result Value Ref Range    Magnesium 1.8 1.6 - 2.4 mg/dL   C-reactive Protein    Collection Time: 11/05/22 12:57 AM    Specimen: Blood   Result Value Ref Range    C-Reactive Protein 10.90 (H) 0.00 - 0.50 mg/dL        Imaging:  XR Chest PA & Lateral  Narrative: DATE OF EXAM:  11/4/2022 2:18 PM     PROCEDURE:  XR CHEST PA AND LATERAL-     INDICATIONS:  edema? PNA?; N17.9-Acute kidney failure, unspecified;  J18.0-Bronchopneumonia, unspecified organism     COMPARISON:  No Comparisons Available     TECHNIQUE:   Two radiologic views of the chest , PA and lateral were obtained.     FINDINGS:  Cardiomegaly. Pulmonary vasculature appears within normal limits. Lungs  clear other than atelectasis in the bases. Costophrenic angles sharp.  Staples in the lingula.      Impression: No radiographic findings of pulmonary edema. No acute process  demonstrated     Electronically Signed By-Ross Wyatt On:11/4/2022 2:56 PM  This report was finalized on [id number] by  Ross Wyatt, .       ASSESSMENT:  Pneumonia  A-fib  Acute kidney injury   Depression  Hx migraine headaches       PLAN:  Encourage to be out of bed daily  PT/OT  Antibiotics  Bronchodilator  Inhaled corticosteroids  Incentive spirometer  Electrolytes/ glycemic control  DVT and GI prophylaxis.    Discussed with Dr. Tawana Zarate, APRN   11/5/2022  11:50 EDT      I personally have examined  and interviewed the patient. I have reviewed the history, data, problems, assessment and plan with our NP.  Total Critical care time in direct medical management (   ) minutes, This time specifically excludes time spent performing procedures.    Lay Gilliam MD   11/5/2022  21:18 EDT         Electronically signed by Lay Gilliam MD at 11/05/22 2118       Consult Notes (last 24 hours)  Notes from 11/06/22 1133 through 11/07/22 1133   No notes of this type exist for this encounter.

## 2022-11-07 NOTE — PROGRESS NOTES
Daily Progress Note          Assessment      Pneumonia  A-fib  Acute kidney injury   Depression  Hx migraine headaches        PLAN:  Encouraged to cough and deep breathe throughout the day and use I-S.  Oxygenating well on room air  PT/OT  Antibiotics  Bronchodilator  Inhaled corticosteroids  Incentive spirometer  Prednisone  Electrolytes/ glycemic control  DVT and GI prophylaxis-Heparin       LOS: 4 days     Subjective     Reports improvement in the shortness of breath    Objective     Vital signs for last 24 hours:  Vitals:    11/06/22 2350 11/07/22 0402 11/07/22 0743 11/07/22 0949   BP: 169/72 166/67  172/80   BP Location: Left arm Left arm  Left arm   Patient Position: Lying Lying  Lying   Pulse: 71 74  88   Resp: 18 18     Temp: 98.2 °F (36.8 °C) 97.9 °F (36.6 °C)     TempSrc: Oral Oral     SpO2: 95% 99% 98% 99%   Weight:  79.3 kg (174 lb 13.2 oz)     Height:           Intake/Output last 3 shifts:  I/O last 3 completed shifts:  In: 1180 [P.O.:1080; IV Piggyback:100]  Out: -   Intake/Output this shift:  I/O this shift:  In: 240 [P.O.:240]  Out: -       Radiology  Imaging Results (Last 24 Hours)     Procedure Component Value Units Date/Time    US Head Neck Soft Tissue [[id number]] Resulted: 11/07/22 0913     Updated: 11/07/22 0926          Labs:  Results from last 7 days   Lab Units 11/02/22  0808   WBC 10*3/mm3 5.60   HEMOGLOBIN g/dL 9.1*   HEMATOCRIT % 27.0*   PLATELETS 10*3/mm3 175     Results from last 7 days   Lab Units 11/04/22  0116 11/02/22  0808 11/01/22  1024   SODIUM mmol/L 140   < > 141   POTASSIUM mmol/L 4.4   < > 3.6   CHLORIDE mmol/L 107   < > 103   CO2 mmol/L 20.0*   < > 17.0*   BUN mg/dL 18   < > 50*   CREATININE mg/dL 0.99   < > 2.77*   CALCIUM mg/dL 8.6   < > 9.0   BILIRUBIN mg/dL  --   --  0.4   ALK PHOS U/L  --   --  92   ALT (SGPT) U/L  --   --  12   AST (SGOT) U/L  --   --  21   GLUCOSE mg/dL 117*   < > 116*    < > = values in this interval not displayed.         Results from last 7 days    Lab Units 11/03/22  0050 11/01/22  1024   ALBUMIN g/dL 3.20* 4.10     Results from last 7 days   Lab Units 11/01/22  1024   TROPONIN T ng/mL <0.010         Results from last 7 days   Lab Units 11/06/22  2255   MAGNESIUM mg/dL 1.8                   Meds:   SCHEDULE  atorvastatin, 40 mg, Oral, Nightly  budesonide, 0.5 mg, Nebulization, BID - RT  busPIRone, 15 mg, Oral, TID  doxycycline, 100 mg, Oral, Q12H  escitalopram, 20 mg, Oral, Daily  heparin (porcine), 5,000 Units, Subcutaneous, Q8H  hydroCHLOROthiazide, 25 mg, Oral, Daily  ipratropium-albuterol, 3 mL, Nebulization, Q6H While Awake - RT  metoprolol tartrate, 75 mg, Oral, BID  predniSONE, 40 mg, Oral, Daily With Breakfast  senna-docusate sodium, 2 tablet, Oral, BID  sodium chloride, 10 mL, Intravenous, Q12H  topiramate, 50 mg, Oral, BID      Infusions     PRNs  •  acetaminophen **OR** acetaminophen **OR** acetaminophen  •  aluminum-magnesium hydroxide-simethicone  •  senna-docusate sodium **AND** polyethylene glycol **AND** bisacodyl **AND** bisacodyl  •  guaiFENesin-dextromethorphan  •  HYDROcodone-acetaminophen  •  hydrOXYzine  •  ipratropium-albuterol  •  magnesium sulfate **OR** magnesium sulfate in D5W 1g/100mL (PREMIX)  •  melatonin  •  nitroglycerin  •  ondansetron **OR** ondansetron  •  potassium chloride  •  potassium chloride  •  [COMPLETED] Insert peripheral IV **AND** sodium chloride  •  sodium chloride  •  SUMAtriptan    Physical Exam:  General Appearance:  Alert   HEENT:  Normocephalic, without obvious abnormality, Conjunctiva/corneas clear,.   Nares normal, no drainage     Neck:  Supple, symmetrical, trachea midline.   Lungs /Chest wall: Mild bilateral basal rhonchi, respirations unlabored, symmetrical wall movement.     Heart:  Regular rate and rhythm, S1 S2 normal  Abdomen: Soft, non-tender, no masses, no organomegaly.    Extremities: No edema, no clubbing or cyanosis constitutional: Negative for     ROS  Constitutional: Negative for chills,  fever and malaise/fatigue.   HENT: Negative.    Eyes: Negative.    Cardiovascular: Negative.    Respiratory: Positive for improvement in the cough and shortness of breath.    Skin: Negative.    Musculoskeletal: Negative.    Gastrointestinal: Negative.    Genitourinary: Negative.    Neurological: Negative.    Psychiatric/Behavioral: Negative.      I reviewed the recent clinical results    Much of this encounter note is an electronic transcription/translation of spoken language to printed text using Dragon Software which might include inadvertent errors in transcription.

## 2022-11-07 NOTE — PLAN OF CARE
Goal Outcome Evaluation:  Plan of Care Reviewed With: patient        Progress: no change  Outcome Evaluation: Pt VSS, down for ultrasound of the neck. Pt has complaints of cough when moving. Pt given robitussen x's 1 and norco x's 1 for pain. Pt resting comfortably with no needs, will continue to monitor.

## 2022-11-07 NOTE — PLAN OF CARE
Goal Outcome Evaluation:               Patient resting between care. Will continue with current plan of care.

## 2022-11-08 ENCOUNTER — READMISSION MANAGEMENT (OUTPATIENT)
Dept: CALL CENTER | Facility: HOSPITAL | Age: 62
End: 2022-11-08

## 2022-11-08 VITALS
BODY MASS INDEX: 29.85 KG/M2 | SYSTOLIC BLOOD PRESSURE: 173 MMHG | TEMPERATURE: 98.4 F | DIASTOLIC BLOOD PRESSURE: 68 MMHG | RESPIRATION RATE: 18 BRPM | OXYGEN SATURATION: 99 % | HEART RATE: 70 BPM | WEIGHT: 174.82 LBS | HEIGHT: 64 IN

## 2022-11-08 LAB
CRP SERPL-MCNC: 1.31 MG/DL (ref 0–0.5)
MAGNESIUM SERPL-MCNC: 1.8 MG/DL (ref 1.6–2.4)
PHOSPHATE SERPL-MCNC: 3.2 MG/DL (ref 2.5–4.5)

## 2022-11-08 PROCEDURE — 94664 DEMO&/EVAL PT USE INHALER: CPT

## 2022-11-08 PROCEDURE — 63710000001 PREDNISONE PER 1 MG: Performed by: HOSPITALIST

## 2022-11-08 PROCEDURE — 86140 C-REACTIVE PROTEIN: CPT | Performed by: HOSPITALIST

## 2022-11-08 PROCEDURE — 94799 UNLISTED PULMONARY SVC/PX: CPT

## 2022-11-08 PROCEDURE — 84100 ASSAY OF PHOSPHORUS: CPT | Performed by: STUDENT IN AN ORGANIZED HEALTH CARE EDUCATION/TRAINING PROGRAM

## 2022-11-08 PROCEDURE — 94761 N-INVAS EAR/PLS OXIMETRY MLT: CPT

## 2022-11-08 PROCEDURE — 83735 ASSAY OF MAGNESIUM: CPT | Performed by: PHYSICIAN ASSISTANT

## 2022-11-08 PROCEDURE — 25010000002 HEPARIN (PORCINE) PER 1000 UNITS: Performed by: HOSPITALIST

## 2022-11-08 PROCEDURE — 36415 COLL VENOUS BLD VENIPUNCTURE: CPT | Performed by: HOSPITALIST

## 2022-11-08 RX ORDER — METOPROLOL TARTRATE 75 MG/1
75 TABLET, FILM COATED ORAL 2 TIMES DAILY
Qty: 60 TABLET | Refills: 0 | Status: SHIPPED | OUTPATIENT
Start: 2022-11-08 | End: 2022-11-08 | Stop reason: SDUPTHER

## 2022-11-08 RX ORDER — IPRATROPIUM BROMIDE AND ALBUTEROL SULFATE 2.5; .5 MG/3ML; MG/3ML
3 SOLUTION RESPIRATORY (INHALATION) EVERY 4 HOURS PRN
Qty: 270 ML | Refills: 0 | Status: SHIPPED | OUTPATIENT
Start: 2022-11-08 | End: 2023-11-29

## 2022-11-08 RX ORDER — METOPROLOL TARTRATE 75 MG/1
75 TABLET, FILM COATED ORAL 2 TIMES DAILY
Qty: 60 TABLET | Refills: 0 | Status: SHIPPED | OUTPATIENT
Start: 2022-11-08 | End: 2022-12-04 | Stop reason: HOSPADM

## 2022-11-08 RX ORDER — PREDNISONE 20 MG/1
40 TABLET ORAL
Qty: 8 TABLET | Refills: 0 | Status: SHIPPED | OUTPATIENT
Start: 2022-11-09 | End: 2022-11-08 | Stop reason: SDUPTHER

## 2022-11-08 RX ORDER — PREDNISONE 20 MG/1
40 TABLET ORAL
Qty: 8 TABLET | Refills: 0 | Status: SHIPPED | OUTPATIENT
Start: 2022-11-09 | End: 2022-11-13

## 2022-11-08 RX ADMIN — IPRATROPIUM BROMIDE AND ALBUTEROL SULFATE 3 ML: .5; 3 SOLUTION RESPIRATORY (INHALATION) at 07:02

## 2022-11-08 RX ADMIN — GUAIFENESIN SYRUP AND DEXTROMETHORPHAN 5 ML: 100; 10 SYRUP ORAL at 09:32

## 2022-11-08 RX ADMIN — HYDROCODONE BITARTRATE AND ACETAMINOPHEN 1 TABLET: 5; 325 TABLET ORAL at 09:32

## 2022-11-08 RX ADMIN — TOPIRAMATE 50 MG: 25 TABLET, FILM COATED ORAL at 09:32

## 2022-11-08 RX ADMIN — Medication 10 ML: at 09:32

## 2022-11-08 RX ADMIN — GUAIFENESIN SYRUP AND DEXTROMETHORPHAN 5 ML: 100; 10 SYRUP ORAL at 01:52

## 2022-11-08 RX ADMIN — HEPARIN SODIUM 5000 UNITS: 5000 INJECTION INTRAVENOUS; SUBCUTANEOUS at 06:16

## 2022-11-08 RX ADMIN — BUDESONIDE 0.5 MG: 0.5 INHALANT RESPIRATORY (INHALATION) at 07:02

## 2022-11-08 RX ADMIN — IPRATROPIUM BROMIDE AND ALBUTEROL SULFATE 3 ML: .5; 3 SOLUTION RESPIRATORY (INHALATION) at 12:15

## 2022-11-08 RX ADMIN — HYDROCODONE BITARTRATE AND ACETAMINOPHEN 1 TABLET: 5; 325 TABLET ORAL at 01:52

## 2022-11-08 RX ADMIN — ESCITALOPRAM OXALATE 20 MG: 10 TABLET ORAL at 09:32

## 2022-11-08 RX ADMIN — METOPROLOL TARTRATE 75 MG: 50 TABLET, FILM COATED ORAL at 09:32

## 2022-11-08 RX ADMIN — PREDNISONE 40 MG: 20 TABLET ORAL at 09:32

## 2022-11-08 RX ADMIN — HYDROCHLOROTHIAZIDE 25 MG: 25 TABLET ORAL at 09:32

## 2022-11-08 RX ADMIN — BUSPIRONE HYDROCHLORIDE 15 MG: 15 TABLET ORAL at 09:32

## 2022-11-08 NOTE — DISCHARGE SUMMARY
[facility] Medicine Services  DISCHARGE SUMMARY    Patient Name: Andressa Marks  : 1960  MRN: [id number]    Date of Admission: 2022  Discharge Diagnosis: Viral Pneumonia 2/2 human metapneumovirus  Date of Discharge:  2022  Primary Care Physician: Provider, No Known      Presenting Problem:   Bronchopneumonia [J18.0]  Pneumonia [J18.9]  Acute kidney injury (HCC) [N17.9]    Active and Resolved Hospital Problems:  Active Hospital Problems    Diagnosis POA   • **Pneumonia [J18.9] Yes   • Acute kidney injury (HCC) [N17.9] Yes   • Atrial fibrillation (HCC) [I48.91] Unknown      Resolved Hospital Problems   No resolved problems to display.         Hospital Course     Hospital Course:  Andressa Marks is a 62 y.o. female     Pneumonia, multifocal  Human metapneumovirus   - Initial CT chest without contrast showing patchy groundglass opacities within the bilateral lower lobes  - Respiratory viral panel positive for human metapneumovirus  - Repeat CT angiography of the chest showing edema and small bilateral pleural effusions  -c/w supportive treatment at home     Right-sided neck swelling  -Ultrasound of neck recommending short term surveillance, ideally within 1-3 months  -f/u with PCP     IRENE, resolved   - Trend BMP  - Was seen by nephrology who subsequently signed off     A-fib, stable   -Continue metoprolol     Hypertension   Hyperlipidemia  Migraines  - Continue  home meds     DVT prophylaxis:  Medical DVT prophylaxis orders are present.    DISCHARGE Follow Up Recommendations for labs and diagnostics:   -Outpatient PCP follow up      Reasons For Change In Medications and Indications for New Medications:      Day of Discharge     Vital Signs:  Temp:  [98.2 °F (36.8 °C)-98.4 °F (36.9 °C)] 98.4 °F (36.9 °C)  Heart Rate:  [64-81] 76  Resp:  [17-18] 18  BP: (144-154)/(54-74) 153/74    Physical Exam:  Physical Exam  Constitutional:       General: She is not in acute  distress.     Appearance: Normal appearance.   HENT:      Mouth/Throat:      Mouth: Mucous membranes are moist.   Eyes:      Extraocular Movements: Extraocular movements intact.      Pupils: Pupils are equal, round, and reactive to light.   Cardiovascular:      Rate and Rhythm: Normal rate and regular rhythm.   Pulmonary:      Effort: Pulmonary effort is normal. No respiratory distress.      Breath sounds: Normal breath sounds. No wheezing.   Abdominal:      General: Bowel sounds are normal. There is no distension.      Palpations: Abdomen is soft.      Tenderness: There is no abdominal tenderness.   Neurological:      General: No focal deficit present.      Mental Status: She is alert and oriented to person, place, and time.              Pertinent  and/or Most Recent Results     LAB RESULTS:      Lab 11/08/22  0049 11/06/22 2255 11/06/22 0105 11/05/22 0057 11/04/22 0818 11/04/22  0116 11/02/22  0808   WBC  --   --   --   --   --   --  5.60   HEMOGLOBIN  --   --   --   --   --   --  9.1*   HEMATOCRIT  --   --   --   --   --   --  27.0*   PLATELETS  --   --   --   --   --   --  175   NEUTROS ABS  --   --   --   --   --   --  3.30   LYMPHS ABS  --   --   --   --   --   --  1.50   MONOS ABS  --   --   --   --   --   --  0.50   EOS ABS  --   --   --   --   --   --  0.30   MCV  --   --   --   --   --   --  87.0   CRP 1.31* 2.98* 5.24* 10.90* 14.25*  --   --    PROCALCITONIN  --   --   --   --   --  0.08  --          Lab 11/08/22  0049 11/06/22  2255 11/06/22  0105 11/05/22 0057 11/04/22 0818 11/04/22  0116 11/03/22  0050 11/02/22  0808   SODIUM  --  142  --   --   --  140 142 145   POTASSIUM  --  4.1  --   --   --  4.4 4.2 4.5   CHLORIDE  --  107  --   --   --  107 114* 116*   CO2  --  21.0*  --   --   --  20.0* 19.0* 20.0*   ANION GAP  --  14.0  --   --   --  13.0 9.0 9.0   BUN  --  21  --   --   --  18 32* 39*   CREATININE  --  1.00  --   --   --  0.99 1.13* 1.24*   EGFR  --  63.0  --   --   --  63.8 54.4* 48.7*    GLUCOSE  --  202*  --   --   --  117* 105* 111*   CALCIUM  --  8.9  --   --   --  8.6 8.0* 7.9*   MAGNESIUM 1.8 1.8 1.7 1.8  --  1.7 1.7 1.9   PHOSPHORUS 3.2 2.4*  --   --  2.4*  --  2.1*  2.1*  --          Lab 11/03/22  0050   ALBUMIN 3.20*                     Brief Urine Lab Results  (Last result in the past 365 days)      Color   Clarity   Blood   Leuk Est   Nitrite   Protein   CREAT   Urine HCG        11/01/22 1040 Dark Yellow  Comment: Result checked   Cloudy  Comment: Result checked   Negative   Trace   Negative   30 mg/dL (1+)               Microbiology Results (last 10 days)     Procedure Component Value - Date/Time    Respiratory Panel PCR w/COVID-19(SARS-CoV-2) [facility] In-House, NP Swab in UTM/VTM, 3-4 HR TAT - Swab, Nasopharynx [[id number]]  (Abnormal) Collected: 11/02/22 0046    Lab Status: Final result Specimen: Swab from Nasopharynx Updated: 11/02/22 0234     ADENOVIRUS, PCR Not Detected     Coronavirus 229E Not Detected     Coronavirus HKU1 Not Detected     Coronavirus NL63 Not Detected     Coronavirus OC43 Not Detected     COVID19 Not Detected     Human Metapneumovirus Detected     Human Rhinovirus/Enterovirus Not Detected     Influenza A PCR Not Detected     Influenza B PCR Not Detected     Parainfluenza Virus 1 Not Detected     Parainfluenza Virus 2 Not Detected     Parainfluenza Virus 3 Not Detected     Parainfluenza Virus 4 Not Detected     RSV, PCR Not Detected     Bordetella pertussis pcr Not Detected     Bordetella parapertussis PCR Not Detected     Chlamydophila pneumoniae PCR Not Detected     Mycoplasma pneumo by PCR Not Detected    Narrative:      In the setting of a positive respiratory panel with a viral infection PLUS a negative procalcitonin without other underlying concern for bacterial infection, consider observing off antibiotics or discontinuation of antibiotics and continue supportive care. If the respiratory panel is positive for atypical bacterial infection  (Bordetella pertussis, Chlamydophila pneumoniae, or Mycoplasma pneumoniae), consider antibiotic de-escalation to target atypical bacterial infection.    MRSA Screen, PCR (Inpatient) - Swab, Nares [[id number]]  (Normal) Collected: 11/02/22 0026    Lab Status: Final result Specimen: Swab from Nares Updated: 11/02/22 0157     MRSA PCR No MRSA Detected    Narrative:      The negative predictive value of this diagnostic test is high and should only be used to consider de-escalating anti-MRSA therapy. A positive result may indicate colonization with MRSA and must be correlated clinically.    Respiratory Culture - Sputum, Cough [[id number]] Collected: 11/02/22 0025    Lab Status: Final result Specimen: Sputum from Cough Updated: 11/04/22 1151     Respiratory Culture Light growth (2+) Normal respiratory fadi. No S. aureus or Pseudomonas aeruginosa detected. Final report.     Gram Stain Moderate (3+) WBCs per low power field      Rare (1+) Epithelial cells per low power field      Moderate (3+) Mixed bacterial morphotypes seen on Gram Stain    Legionella Antigen, Urine - Urine, Urine, Clean Catch [[id number]]  (Normal) Collected: 11/01/22 1040    Lab Status: Final result Specimen: Urine, Clean Catch Updated: 11/01/22 1529     LEGIONELLA ANTIGEN, URINE Negative    S. Pneumo Ag Urine or CSF - Urine, Urine, Clean Catch [[id number]]  (Normal) Collected: 11/01/22 1040    Lab Status: Final result Specimen: Urine, Clean Catch Updated: 11/01/22 1529     Strep Pneumo Ag Negative    Blood Culture - Blood, Arm, Right [[id number]]  (Normal) Collected: 11/01/22 1024    Lab Status: Final result Specimen: Blood from Arm, Right Updated: 11/06/22 0946     Blood Culture No growth at 5 days    Blood Culture - Blood, Arm, Left [[id number]]  (Normal) Collected: 11/01/22 1024    Lab Status: Final result Specimen: Blood from Arm, Left Updated: 11/06/22 0946     Blood Culture No growth at 5 days          CT Chest Without Contrast Diagnostic    Result  Date: 11/1/2022  Impression: 1. Patchy groundglass opacities within the bilateral lower lobes, likely representing bronchopneumonia and/or bronchiolitis.    Electronically Signed By-Tee Rao MD On:11/1/2022 12:51 PM This report was finalized on [id number] by  Tee Rao MD.    US Head Neck Soft Tissue    Result Date: 11/7/2022  Impression: There is a single mildly prominent indeterminate right level 2 node at the site of patient's area of concern. Consider short-term surveillance ultrasound to document stability or improvement. The other sites of the palpable complaint in the right neck and at the level of the right clavicle are within normal limits.  Electronically Signed By-Pauline Callahan MD On:11/7/2022 1:10 PM This report was finalized on [id number] by  Pauline Callahan MD.    CT Angiogram Chest Pulmonary Embolism    Result Date: 11/3/2022  Impression:  1. No pulmonary embolism. 2. Features of developing interstitial and alveolar edema with small bilateral pleural effusions, new since 11/1/2022. 3. New mild bilateral lower lobe and right middle lobe atelectasis.    Electronically Signed By-Pauline Callahan MD On:11/3/2022 1:10 PM This report was finalized on [id number] by  Pauline Callahan MD.    XR Chest PA & Lateral    Result Date: 11/4/2022  Impression: No radiographic findings of pulmonary edema. No acute process demonstrated  Electronically Signed By-Ross Wyatt On:11/4/2022 2:56 PM This report was finalized on [id number] by  Ross Wyatt, .                  Labs Pending at Discharge:      Procedures Performed           Consults:   Consults     Date and Time Order Name Status Description    11/3/2022 11:29 AM Inpatient Pulmonology Consult Completed     11/1/2022  2:58 PM Inpatient Nephrology Consult Completed             Discharge Details        Discharge Medications      ASK your doctor about these medications      Instructions Start Date   atorvastatin 40 MG tablet  Commonly  known as: LIPITOR   40 mg, Oral, Daily      busPIRone 15 MG tablet  Commonly known as: BUSPAR   15 mg, Oral, 3 Times Daily      escitalopram 20 MG tablet  Commonly known as: LEXAPRO   20 mg, Oral, Daily      fluticasone 50 MCG/ACT nasal spray  Commonly known as: FLONASE   1 spray, Nasal, Daily      hydroCHLOROthiazide 25 MG tablet  Commonly known as: HYDRODIURIL   25 mg, Oral, Daily      hydrOXYzine 25 MG tablet  Commonly known as: ATARAX   25 mg, Oral, 3 Times Daily PRN      lisinopril 20 MG tablet  Commonly known as: PRINIVIL,ZESTRIL   20 mg, Oral, 2 Times Daily      metoprolol tartrate 50 MG tablet  Commonly known as: LOPRESSOR   75 mg, Oral, 2 Times Daily      multivitamin with minerals tablet tablet   1 tablet, Oral, Daily      SUMAtriptan 50 MG tablet  Commonly known as: IMITREX   50 mg, Oral, Every 2 Hours PRN, Take one tablet at onset of headache. May repeat dose one time in 2 hours if headache not relieved.      topiramate 50 MG tablet  Commonly known as: TOPAMAX   50 mg, Oral, 2 Times Daily      traZODone 50 MG tablet  Commonly known as: DESYREL    mg, Oral, Nightly PRN             Allergies   Allergen Reactions   • Penicillins Shortness Of Breath         Discharge Disposition: Home      Diet:  Hospital:  Diet Order   Procedures   • Diet Regular         Discharge Activity:         CODE STATUS:  Code Status and Medical Interventions:   Ordered at: 11/01/22 1458     Level Of Support Discussed With:    Patient     Code Status (Patient has no pulse and is not breathing):    CPR (Attempt to Resuscitate)     Medical Interventions (Patient has pulse or is breathing):    Full Support         No future appointments.        Time spent on Discharge including face to face service:  30 minutes        Signature: Electronically signed by Dionisio Oneal MD, 11/08/22, 10:47 AM EST.

## 2022-11-08 NOTE — PLAN OF CARE
Goal Outcome Evaluation:               Patient resting between care. PRN Robitussin given for cough. Will continue with current plan of care.

## 2022-11-08 NOTE — PROGRESS NOTES
Daily Progress Note          Assessment      Pneumonia  A-fib  Acute kidney injury   Depression  Hx migraine headaches        PLAN:  Pt is stable to be discharged from pulmonary stand point  Encouraged to cough and deep breathe throughout the day and use I-S.  Oxygenating well on room air  PT/OT  Antibiotics  Bronchodilator  Inhaled corticosteroids  Incentive spirometer  Prednisone  Electrolytes/ glycemic control  DVT and GI prophylaxis-Heparin       LOS: 5 days     Subjective     Reports improvement in the shortness of breath    Objective     Vital signs for last 24 hours:  Vitals:    11/08/22 0713 11/08/22 1135 11/08/22 1215 11/08/22 1220   BP:  173/68     BP Location:  Right arm     Patient Position:  Sitting     Pulse: 76 64 67 70   Resp: 18 18 18 18   Temp:  98.4 °F (36.9 °C)     TempSrc:  Oral     SpO2: 98% 98% 99% 99%   Weight:       Height:           Intake/Output last 3 shifts:  I/O last 3 completed shifts:  In: 1080 [P.O.:1080]  Out: -   Intake/Output this shift:  No intake/output data recorded.      Radiology  Imaging Results (Last 24 Hours)     Procedure Component Value Units Date/Time    US Head Neck Soft Tissue [[id number]] Collected: 11/07/22 1255     Updated: 11/07/22 1312    Narrative:      Examination: US HEAD NECK SOFT TISSUE-     Date of Exam: 11/7/2022 9:13 AM     Indication: Right-sided neck swelling; N17.9-Acute kidney failure,  unspecified; J18.0-Bronchopneumonia, unspecified organism     Comparison: None available.     Technique: Grayscale and color Doppler ultrasound evaluation of the neck  lymph nodes was performed.     Findings:  Right side:    Level IA:  No significant lymph nodes.    Level IB:  No significant lymph nodes.    Level II:  1.5 x 0.4 x 1.6 cm. This lymph node has mild cortical  thickening but maintains a fatty hilum.  Level III:  No significant lymph nodes.    Level IV:  1.4 x 0.6 x 0.3 cm. This lymph node has a very thin cortex  and a normal fatty hilum and is thought to be  benign.  Level V:  No significant lymph nodes.    Level VI and VII:  No significant lymph nodes.          Additional imaging and the area of concern in the right clavicle  demonstrates no abnormality.       Impression:      There is a single mildly prominent indeterminate right level  2 node at the site of patient's area of concern. Consider short-term  surveillance ultrasound to document stability or improvement. The other  sites of the palpable complaint in the right neck and at the level of  the right clavicle are within normal limits.     Electronically Signed By-Pauline Callahan MD On:11/7/2022 1:10 PM  This report was finalized on [id number] by  Pauline Callahan MD.          Labs:  Results from last 7 days   Lab Units 11/02/22  0808   WBC 10*3/mm3 5.60   HEMOGLOBIN g/dL 9.1*   HEMATOCRIT % 27.0*   PLATELETS 10*3/mm3 175     Results from last 7 days   Lab Units 11/06/22  2255   SODIUM mmol/L 142   POTASSIUM mmol/L 4.1   CHLORIDE mmol/L 107   CO2 mmol/L 21.0*   BUN mg/dL 21   CREATININE mg/dL 1.00   CALCIUM mg/dL 8.9   GLUCOSE mg/dL 202*         Results from last 7 days   Lab Units 11/03/22  0050   ALBUMIN g/dL 3.20*             Results from last 7 days   Lab Units 11/08/22  0049   MAGNESIUM mg/dL 1.8                   Meds:   SCHEDULE    Infusions  No current facility-administered medications for this encounter.    PRNs      Physical Exam:  General Appearance:  Alert   HEENT:  Normocephalic, without obvious abnormality, Conjunctiva/corneas clear,.   Nares normal, no drainage     Neck:  Supple, symmetrical, trachea midline.   Lungs /Chest wall: Mild bilateral basal rhonchi, respirations unlabored, symmetrical wall movement.     Heart:  Regular rate and rhythm, S1 S2 normal  Abdomen: Soft, non-tender, no masses, no organomegaly.    Extremities: No edema, no clubbing or cyanosis constitutional: Negative for     ROS  Constitutional: Negative for chills, fever and malaise/fatigue.   HENT: Negative.    Eyes:  Negative.    Cardiovascular: Negative.    Respiratory: Positive for improvement in the cough and shortness of breath.    Skin: Negative.    Musculoskeletal: Negative.    Gastrointestinal: Negative.    Genitourinary: Negative.    Neurological: Negative.    Psychiatric/Behavioral: Negative.      I reviewed the recent clinical results    Much of this encounter note is an electronic transcription/translation of spoken language to printed text using Dragon Software which might include inadvertent errors in transcription.

## 2022-11-08 NOTE — CASE MANAGEMENT/SOCIAL WORK
Case Management Discharge Note      Final Note: Routine home.         Selected Continued Care - Discharged on 11/8/2022 Admission date: 11/1/2022 - Discharge disposition: Home or Self Care     Transportation Services  Private: Car    Final Discharge Disposition Code: 01 - home or self-care

## 2022-11-08 NOTE — PROGRESS NOTES
[facility] Medicine Services Daily Progress Note    Patient Name: Andressa Marks  : 1960  MRN: [id number]  Primary Care Physician:  Provider, No Known  Date of admission: 2022      Subjective      Reports further improvement. Says her appetite is still lagging but is improving. PO intake encouraged.     Objective      Vitals:   Temp:  [98.2 °F (36.8 °C)-98.4 °F (36.9 °C)] 98.4 °F (36.9 °C)  Heart Rate:  [64-81] 76  Resp:  [17-18] 18  BP: (144-154)/(54-74) 153/74    PHYSICAL EXAM:     Constitutional:       Appearance: Appears unwell but not toxic  HENT:      Head: Normocephalic and atraumatic.   Eyes:      Extraocular Movements: Extraocular movements intact.      Pupils: Pupils are equal, round, and reactive to light.   Cardiovascular:      Rate and Rhythm: Normal rate and regular rhythm.      Pulses: Normal pulses.      Heart sounds: Normal heart sounds.   Pulmonary:     Lungs are clear to auscultation bilaterally; no wheezes appreciated  Abdominal:      General: Abdomen is flat. Bowel sounds are normal. There is no distension.   Musculoskeletal:      Cervical back: Normal range of motion and neck supple.      Right lower leg: No edema.      Left lower leg: No edema.   Skin:     General: Skin is warm.   Neurological:      General: No focal deficit present.      Mental Status: He is alert and oriented to person, place, and time.   Psychiatric:         Mood and Affect: Mood normal.         Behavior: Behavior normal.         Assessment & Plan    Pneumonia, multifocal  Human metapneumovirus   - Initial CT chest without contrast showing patchy groundglass opacities within the bilateral lower lobes  - Respiratory viral panel positive for human metapneumovirus  - Repeat CT angiography of the chest showing edema and small bilateral pleural effusions  - Pulmonary consulted  - Holding IV fluids for now and may benefit from some diuresis and/oral steroids    Right-sided neck  swelling  -Ultrasound ordered  - Prednisone     IRENE, resolved   - Trend BMP  - Was seen by nephrology who subsequently signed off     A-fib, stable   -Continue metoprolol     Hypertension   Hyperlipidemia  Migraines  - Continue  home meds    DVT prophylaxis:  Medical DVT prophylaxis orders are present.    CODE STATUS:    Level Of Support Discussed With: Patient  Code Status (Patient has no pulse and is not breathing): CPR (Attempt to Resuscitate)  Medical Interventions (Patient has pulse or is breathing): Full Support      Expected length of stay: TBD    Electronically signed by Dionisio Oneal MD, 11/08/22, 10:52 EST.  [facility] Hospitalist Team

## 2022-11-09 NOTE — OUTREACH NOTE
Prep Survey    Flowsheet Row Responses   [facility] facility [facility] discharged from? [facility]   Is LACE score < 7 ? No   Emergency Room discharge w/ pulse ox? No   Eligibility Readm Mgmt   Discharge diagnosis Viral Pneumonia 2/2 human metapneumovirus   Does the patient have one of the following disease processes/diagnoses(primary or secondary)? Pneumonia   Does the patient have Home health ordered? No   Is there a DME ordered? No   Prep survey completed? Yes          SHELIA STEVENS - Registered Nurse

## 2022-11-11 ENCOUNTER — READMISSION MANAGEMENT (OUTPATIENT)
Dept: CALL CENTER | Facility: HOSPITAL | Age: 62
End: 2022-11-11

## 2022-11-11 NOTE — OUTREACH NOTE
COPD/PN Week 1 Survey    Flowsheet Row Responses   [facility] patient discharged from? [facility]   Does the patient have one of the following disease processes/diagnoses(primary or secondary)? Pneumonia   Week 1 attempt successful? Yes   Call start time 1032   Call end time 1039   Discharge diagnosis Viral Pneumonia 2/2 human metapneumovirus   Meds reviewed with patient/caregiver? Yes   Is the patient having any side effects they believe may be caused by any medication additions or changes? No   Does the patient have all medications ordered at discharge? Yes   Is the patient taking all medications as directed (includes completed medication regime)? Yes   Does the patient have a primary care provider?  No   PCP Nursing Intervention Provided number to obtain PCP   Does the patient have an appointment with their PCP or specialist within 7 days of discharge? No   Comments regarding PCP Patient Connection Hub phone # given   What is preventing the patient from scheduling follow up appointments within 7 days of discharge? Unsure of when or with whom   Nursing Interventions Advised patient to make appointment   Has the patient kept scheduled appointments due by today? N/A   Pulse Ox monitoring None   Psychosocial issues? No   Did the patient receive a copy of their discharge instructions? Yes   Nursing interventions Reviewed instructions with patient   What is the patient's perception of their health status since discharge? Same   Nursing Interventions Nurse provided patient education   Is the patient/caregiver able to teach back the hierarchy of who to call/visit for symptoms/problems? PCP, Specialist, Home health nurse, Urgent Care, ED, 911 Yes   Additional teach back comments States she is feeling about the same.  She will make appt with a new PCP and with Dr. Kim   Is the patient/caregiver able to teach back signs and symptoms of worsening condition: Fever/chills, Shortness of breath, Chest pain   Is the  patient/caregiver able to teach back importance of completing antibiotic course of treatment? Yes   Week 1 call completed? Yes   Wrap up additional comments Pt to make appt with new PCP and with Dr. Julio STEVENS - Licensed Nurse

## 2022-11-22 ENCOUNTER — READMISSION MANAGEMENT (OUTPATIENT)
Dept: CALL CENTER | Facility: HOSPITAL | Age: 62
End: 2022-11-22

## 2022-11-22 NOTE — OUTREACH NOTE
COPD/PN Week 2 Survey    Flowsheet Row Responses   [facility] patient discharged from? [facility]   Does the patient have one of the following disease processes/diagnoses(primary or secondary)? Pneumonia   Week 2 attempt successful? Yes   Call start time 1024   Call end time 1026   Discharge diagnosis Viral Pneumonia 2/2 human metapneumovirus   Person spoke with today (if not patient) and relationship Patient   Meds reviewed with patient/caregiver? Yes   Is the patient taking all medications as directed (includes completed medication regime)? Yes   Comments regarding PCP Patient states she is on her way to a dr appt now.   Has the patient kept scheduled appointments due by today? N/A   Has home health visited the patient within 72 hours of discharge? N/A   Pulse Ox monitoring None   Psychosocial issues? No   Did the patient receive a copy of their discharge instructions? Yes   What is the patient's perception of their health status since discharge? Improving   Is the patient able to teach back COPD zones? Yes   Patient reports what zone on this call? Green Zone   Green Zone Reports doing well   Week 2 call completed? Yes   Graduated/Revoked comments Quick call patient states she is doing well. She is on her way to a doctors appt. No concerns.          LAURA YORK - Registered Nurse

## 2022-11-28 ENCOUNTER — APPOINTMENT (OUTPATIENT)
Dept: GENERAL RADIOLOGY | Facility: HOSPITAL | Age: 62
End: 2022-11-28

## 2022-11-28 LAB
ALBUMIN SERPL-MCNC: 4.5 G/DL (ref 3.5–5.2)
ALBUMIN/GLOB SERPL: 1.3 G/DL
ALP SERPL-CCNC: 109 U/L (ref 39–117)
ALT SERPL W P-5'-P-CCNC: 16 U/L (ref 1–33)
ANION GAP SERPL CALCULATED.3IONS-SCNC: 18 MMOL/L (ref 5–15)
APTT PPP: 30.1 SECONDS (ref 24–31)
AST SERPL-CCNC: 26 U/L (ref 1–32)
BASOPHILS # BLD AUTO: 0 10*3/MM3 (ref 0–0.2)
BASOPHILS NFR BLD AUTO: 0.3 % (ref 0–1.5)
BILIRUB SERPL-MCNC: 0.3 MG/DL (ref 0–1.2)
BUN SERPL-MCNC: 38 MG/DL (ref 8–23)
BUN/CREAT SERPL: 21.8 (ref 7–25)
CALCIUM SPEC-SCNC: 9.6 MG/DL (ref 8.6–10.5)
CHLORIDE SERPL-SCNC: 103 MMOL/L (ref 98–107)
CO2 SERPL-SCNC: 18 MMOL/L (ref 22–29)
CREAT SERPL-MCNC: 1.74 MG/DL (ref 0.57–1)
D DIMER PPP FEU-MCNC: 1.28 MG/L (FEU) (ref 0–0.59)
DEPRECATED RDW RBC AUTO: 43.8 FL (ref 37–54)
EGFRCR SERPLBLD CKD-EPI 2021: 32.4 ML/MIN/1.73
EOSINOPHIL # BLD AUTO: 0 10*3/MM3 (ref 0–0.4)
EOSINOPHIL NFR BLD AUTO: 0.6 % (ref 0.3–6.2)
ERYTHROCYTE [DISTWIDTH] IN BLOOD BY AUTOMATED COUNT: 14.7 % (ref 12.3–15.4)
GLOBULIN UR ELPH-MCNC: 3.6 GM/DL
GLUCOSE SERPL-MCNC: 129 MG/DL (ref 65–99)
HCT VFR BLD AUTO: 36.2 % (ref 34–46.6)
HGB BLD-MCNC: 12.1 G/DL (ref 12–15.9)
HOLD SPECIMEN: NORMAL
INR PPP: 1.02 (ref 0.93–1.1)
LYMPHOCYTES # BLD AUTO: 1.3 10*3/MM3 (ref 0.7–3.1)
LYMPHOCYTES NFR BLD AUTO: 19.2 % (ref 19.6–45.3)
MCH RBC QN AUTO: 28.5 PG (ref 26.6–33)
MCHC RBC AUTO-ENTMCNC: 33.3 G/DL (ref 31.5–35.7)
MCV RBC AUTO: 85.6 FL (ref 79–97)
MONOCYTES # BLD AUTO: 0.7 10*3/MM3 (ref 0.1–0.9)
MONOCYTES NFR BLD AUTO: 9.6 % (ref 5–12)
NEUTROPHILS NFR BLD AUTO: 4.8 10*3/MM3 (ref 1.7–7)
NEUTROPHILS NFR BLD AUTO: 70.3 % (ref 42.7–76)
NRBC BLD AUTO-RTO: 0 /100 WBC (ref 0–0.2)
NT-PROBNP SERPL-MCNC: 4059 PG/ML (ref 0–900)
PLATELET # BLD AUTO: 237 10*3/MM3 (ref 140–450)
PMV BLD AUTO: 7.8 FL (ref 6–12)
POTASSIUM SERPL-SCNC: 3.8 MMOL/L (ref 3.5–5.2)
PROT SERPL-MCNC: 8.1 G/DL (ref 6–8.5)
PROTHROMBIN TIME: 10.5 SECONDS (ref 9.6–11.7)
RBC # BLD AUTO: 4.23 10*6/MM3 (ref 3.77–5.28)
SODIUM SERPL-SCNC: 139 MMOL/L (ref 136–145)
TROPONIN T SERPL-MCNC: <0.01 NG/ML (ref 0–0.03)
WBC NRBC COR # BLD: 6.8 10*3/MM3 (ref 3.4–10.8)

## 2022-11-28 PROCEDURE — 83880 ASSAY OF NATRIURETIC PEPTIDE: CPT | Performed by: NURSE PRACTITIONER

## 2022-11-28 PROCEDURE — 85025 COMPLETE CBC W/AUTO DIFF WBC: CPT | Performed by: NURSE PRACTITIONER

## 2022-11-28 PROCEDURE — 87040 BLOOD CULTURE FOR BACTERIA: CPT | Performed by: NURSE PRACTITIONER

## 2022-11-28 PROCEDURE — 85379 FIBRIN DEGRADATION QUANT: CPT | Performed by: NURSE PRACTITIONER

## 2022-11-28 PROCEDURE — 93005 ELECTROCARDIOGRAM TRACING: CPT | Performed by: NURSE PRACTITIONER

## 2022-11-28 PROCEDURE — 80053 COMPREHEN METABOLIC PANEL: CPT | Performed by: NURSE PRACTITIONER

## 2022-11-28 PROCEDURE — 85610 PROTHROMBIN TIME: CPT | Performed by: NURSE PRACTITIONER

## 2022-11-28 PROCEDURE — 93005 ELECTROCARDIOGRAM TRACING: CPT

## 2022-11-28 PROCEDURE — 99285 EMERGENCY DEPT VISIT HI MDM: CPT

## 2022-11-28 PROCEDURE — 84484 ASSAY OF TROPONIN QUANT: CPT | Performed by: NURSE PRACTITIONER

## 2022-11-28 PROCEDURE — 85730 THROMBOPLASTIN TIME PARTIAL: CPT | Performed by: NURSE PRACTITIONER

## 2022-11-28 RX ORDER — SODIUM CHLORIDE 0.9 % (FLUSH) 0.9 %
10 SYRINGE (ML) INJECTION AS NEEDED
Status: DISCONTINUED | OUTPATIENT
Start: 2022-11-28 | End: 2022-12-04 | Stop reason: HOSPADM

## 2022-11-28 RX ADMIN — SODIUM CHLORIDE, POTASSIUM CHLORIDE, SODIUM LACTATE AND CALCIUM CHLORIDE 1000 ML: 600; 310; 30; 20 INJECTION, SOLUTION INTRAVENOUS at 23:57

## 2022-11-29 ENCOUNTER — APPOINTMENT (OUTPATIENT)
Dept: CT IMAGING | Facility: HOSPITAL | Age: 62
End: 2022-11-29

## 2022-11-29 ENCOUNTER — HOSPITAL ENCOUNTER (OUTPATIENT)
Facility: HOSPITAL | Age: 62
Setting detail: OBSERVATION
Discharge: HOME | End: 2022-12-04
Attending: NURSE PRACTITIONER | Admitting: INTERNAL MEDICINE

## 2022-11-29 ENCOUNTER — APPOINTMENT (OUTPATIENT)
Dept: GENERAL RADIOLOGY | Facility: HOSPITAL | Age: 62
End: 2022-11-29

## 2022-11-29 DIAGNOSIS — J10.1 INFLUENZA A: ICD-10-CM

## 2022-11-29 DIAGNOSIS — N17.9 AKI (ACUTE KIDNEY INJURY): Primary | ICD-10-CM

## 2022-11-29 PROBLEM — G45.9 TRANSIENT ISCHEMIC ATTACK: Status: ACTIVE | Noted: 2021-05-17

## 2022-11-29 PROBLEM — E78.5 HYPERLIPIDEMIA: Status: ACTIVE | Noted: 2021-06-14

## 2022-11-29 PROBLEM — F32.A DEPRESSIVE DISORDER: Chronic | Status: ACTIVE | Noted: 2021-06-14

## 2022-11-29 PROBLEM — F41.9 ANXIETY: Chronic | Status: ACTIVE | Noted: 2021-06-14

## 2022-11-29 PROBLEM — G44.009 CLUSTER HEADACHES: Status: ACTIVE | Noted: 2021-06-14

## 2022-11-29 PROBLEM — F32.A DEPRESSIVE DISORDER: Status: ACTIVE | Noted: 2021-06-14

## 2022-11-29 PROBLEM — F41.9 ANXIETY: Status: ACTIVE | Noted: 2021-06-14

## 2022-11-29 PROBLEM — E78.5 HYPERLIPIDEMIA: Chronic | Status: ACTIVE | Noted: 2021-06-14

## 2022-11-29 PROBLEM — R55 SYNCOPE: Status: ACTIVE | Noted: 2021-05-17

## 2022-11-29 LAB
ANION GAP SERPL CALCULATED.3IONS-SCNC: 12 MMOL/L (ref 5–15)
BACTERIA UR QL AUTO: ABNORMAL /HPF
BASOPHILS # BLD AUTO: 0 10*3/MM3 (ref 0–0.2)
BASOPHILS NFR BLD AUTO: 0.2 % (ref 0–1.5)
BILIRUB UR QL STRIP: NEGATIVE
BUN SERPL-MCNC: 36 MG/DL (ref 8–23)
BUN/CREAT SERPL: 26.9 (ref 7–25)
CALCIUM SPEC-SCNC: 9.2 MG/DL (ref 8.6–10.5)
CHLORIDE SERPL-SCNC: 107 MMOL/L (ref 98–107)
CHOLEST SERPL-MCNC: 144 MG/DL (ref 0–200)
CLARITY UR: CLEAR
CO2 SERPL-SCNC: 21 MMOL/L (ref 22–29)
COLOR UR: YELLOW
CREAT SERPL-MCNC: 1.34 MG/DL (ref 0.57–1)
D-LACTATE SERPL-SCNC: 0.7 MMOL/L (ref 0.5–2)
DEPRECATED RDW RBC AUTO: 47.7 FL (ref 37–54)
EGFRCR SERPLBLD CKD-EPI 2021: 44.4 ML/MIN/1.73
EOSINOPHIL # BLD AUTO: 0 10*3/MM3 (ref 0–0.4)
EOSINOPHIL NFR BLD AUTO: 0.7 % (ref 0.3–6.2)
ERYTHROCYTE [DISTWIDTH] IN BLOOD BY AUTOMATED COUNT: 15 % (ref 12.3–15.4)
FLUAV SUBTYP SPEC NAA+PROBE: DETECTED
FLUBV RNA ISLT QL NAA+PROBE: NOT DETECTED
GLUCOSE SERPL-MCNC: 105 MG/DL (ref 65–99)
GLUCOSE UR STRIP-MCNC: NEGATIVE MG/DL
HCT VFR BLD AUTO: 33.2 % (ref 34–46.6)
HDLC SERPL-MCNC: 38 MG/DL (ref 40–60)
HGB BLD-MCNC: 11.2 G/DL (ref 12–15.9)
HGB UR QL STRIP.AUTO: NEGATIVE
HOLD SPECIMEN: NORMAL
HYALINE CASTS UR QL AUTO: ABNORMAL /LPF
KETONES UR QL STRIP: NEGATIVE
LDLC SERPL CALC-MCNC: 79 MG/DL (ref 0–100)
LDLC/HDLC SERPL: 1.96 {RATIO}
LEUKOCYTE ESTERASE UR QL STRIP.AUTO: ABNORMAL
LYMPHOCYTES # BLD AUTO: 1.7 10*3/MM3 (ref 0.7–3.1)
LYMPHOCYTES NFR BLD AUTO: 27.7 % (ref 19.6–45.3)
MCH RBC QN AUTO: 29 PG (ref 26.6–33)
MCHC RBC AUTO-ENTMCNC: 33.7 G/DL (ref 31.5–35.7)
MCV RBC AUTO: 86 FL (ref 79–97)
MONOCYTES # BLD AUTO: 0.8 10*3/MM3 (ref 0.1–0.9)
MONOCYTES NFR BLD AUTO: 13.4 % (ref 5–12)
NEUTROPHILS NFR BLD AUTO: 3.6 10*3/MM3 (ref 1.7–7)
NEUTROPHILS NFR BLD AUTO: 58 % (ref 42.7–76)
NITRITE UR QL STRIP: NEGATIVE
NRBC BLD AUTO-RTO: 0.1 /100 WBC (ref 0–0.2)
PH UR STRIP.AUTO: <=5 [PH] (ref 5–8)
PLATELET # BLD AUTO: 178 10*3/MM3 (ref 140–450)
PMV BLD AUTO: 8.3 FL (ref 6–12)
POTASSIUM SERPL-SCNC: 4.6 MMOL/L (ref 3.5–5.2)
PROCALCITONIN SERPL-MCNC: 0.24 NG/ML (ref 0–0.25)
PROT UR QL STRIP: ABNORMAL
RBC # BLD AUTO: 3.86 10*6/MM3 (ref 3.77–5.28)
RBC # UR STRIP: ABNORMAL /HPF
REF LAB TEST METHOD: ABNORMAL
S PYO AG THROAT QL: NEGATIVE
SODIUM SERPL-SCNC: 140 MMOL/L (ref 136–145)
SODIUM UR-SCNC: 100 MMOL/L
SP GR UR STRIP: 1.03 (ref 1–1.03)
SQUAMOUS #/AREA URNS HPF: ABNORMAL /HPF
TRANS CELLS #/AREA URNS HPF: ABNORMAL /HPF
TRIGL SERPL-MCNC: 157 MG/DL (ref 0–150)
UROBILINOGEN UR QL STRIP: ABNORMAL
VLDLC SERPL-MCNC: 27 MG/DL (ref 5–40)
WBC # UR STRIP: ABNORMAL /HPF
WBC NRBC COR # BLD: 6.2 10*3/MM3 (ref 3.4–10.8)
WHOLE BLOOD HOLD COAG: NORMAL
WHOLE BLOOD HOLD SPECIMEN: NORMAL

## 2022-11-29 PROCEDURE — 25010000002 KETOROLAC TROMETHAMINE PER 15 MG

## 2022-11-29 PROCEDURE — 71275 CT ANGIOGRAPHY CHEST: CPT

## 2022-11-29 PROCEDURE — 96361 HYDRATE IV INFUSION ADD-ON: CPT

## 2022-11-29 PROCEDURE — 80061 LIPID PANEL: CPT | Performed by: PHYSICIAN ASSISTANT

## 2022-11-29 PROCEDURE — 85025 COMPLETE CBC W/AUTO DIFF WBC: CPT | Performed by: PHYSICIAN ASSISTANT

## 2022-11-29 PROCEDURE — G0378 HOSPITAL OBSERVATION PER HR: HCPCS

## 2022-11-29 PROCEDURE — 96374 THER/PROPH/DIAG INJ IV PUSH: CPT

## 2022-11-29 PROCEDURE — 84300 ASSAY OF URINE SODIUM: CPT | Performed by: HOSPITALIST

## 2022-11-29 PROCEDURE — 25010000002 ONDANSETRON PER 1 MG: Performed by: PHYSICIAN ASSISTANT

## 2022-11-29 PROCEDURE — 87651 STREP A DNA AMP PROBE: CPT | Performed by: PHYSICIAN ASSISTANT

## 2022-11-29 PROCEDURE — 80048 BASIC METABOLIC PNL TOTAL CA: CPT | Performed by: PHYSICIAN ASSISTANT

## 2022-11-29 PROCEDURE — 83605 ASSAY OF LACTIC ACID: CPT

## 2022-11-29 PROCEDURE — 0 IOPAMIDOL PER 1 ML: Performed by: EMERGENCY MEDICINE

## 2022-11-29 PROCEDURE — 87502 INFLUENZA DNA AMP PROBE: CPT

## 2022-11-29 PROCEDURE — 81001 URINALYSIS AUTO W/SCOPE: CPT | Performed by: HOSPITALIST

## 2022-11-29 PROCEDURE — 84145 PROCALCITONIN (PCT): CPT | Performed by: PHYSICIAN ASSISTANT

## 2022-11-29 PROCEDURE — 96375 TX/PRO/DX INJ NEW DRUG ADDON: CPT

## 2022-11-29 PROCEDURE — 36415 COLL VENOUS BLD VENIPUNCTURE: CPT | Performed by: PHYSICIAN ASSISTANT

## 2022-11-29 PROCEDURE — 71046 X-RAY EXAM CHEST 2 VIEWS: CPT

## 2022-11-29 RX ORDER — SODIUM CHLORIDE 9 MG/ML
40 INJECTION, SOLUTION INTRAVENOUS AS NEEDED
Status: DISCONTINUED | OUTPATIENT
Start: 2022-11-29 | End: 2022-12-04 | Stop reason: HOSPADM

## 2022-11-29 RX ORDER — MAGNESIUM SULFATE HEPTAHYDRATE 40 MG/ML
4 INJECTION, SOLUTION INTRAVENOUS AS NEEDED
Status: DISCONTINUED | OUTPATIENT
Start: 2022-11-29 | End: 2022-12-04 | Stop reason: HOSPADM

## 2022-11-29 RX ORDER — POTASSIUM CHLORIDE 7.45 MG/ML
10 INJECTION INTRAVENOUS
Status: DISCONTINUED | OUTPATIENT
Start: 2022-11-29 | End: 2022-12-04 | Stop reason: HOSPADM

## 2022-11-29 RX ORDER — BENZONATATE 100 MG/1
200 CAPSULE ORAL 3 TIMES DAILY PRN
Status: DISCONTINUED | OUTPATIENT
Start: 2022-11-29 | End: 2022-12-04 | Stop reason: HOSPADM

## 2022-11-29 RX ORDER — ONDANSETRON 2 MG/ML
4 INJECTION INTRAMUSCULAR; INTRAVENOUS EVERY 6 HOURS PRN
Status: DISCONTINUED | OUTPATIENT
Start: 2022-11-29 | End: 2022-12-04 | Stop reason: HOSPADM

## 2022-11-29 RX ORDER — CHOLECALCIFEROL (VITAMIN D3) 125 MCG
5 CAPSULE ORAL NIGHTLY PRN
Status: DISCONTINUED | OUTPATIENT
Start: 2022-11-29 | End: 2022-12-04 | Stop reason: HOSPADM

## 2022-11-29 RX ORDER — ALUMINA, MAGNESIA, AND SIMETHICONE 2400; 2400; 240 MG/30ML; MG/30ML; MG/30ML
15 SUSPENSION ORAL EVERY 6 HOURS PRN
Status: DISCONTINUED | OUTPATIENT
Start: 2022-11-29 | End: 2022-12-04 | Stop reason: HOSPADM

## 2022-11-29 RX ORDER — ONDANSETRON 2 MG/ML
4 INJECTION INTRAMUSCULAR; INTRAVENOUS ONCE
Status: COMPLETED | OUTPATIENT
Start: 2022-11-29 | End: 2022-11-29

## 2022-11-29 RX ORDER — POTASSIUM CHLORIDE 1.5 G/1.77G
40 POWDER, FOR SOLUTION ORAL AS NEEDED
Status: DISCONTINUED | OUTPATIENT
Start: 2022-11-29 | End: 2022-12-04 | Stop reason: HOSPADM

## 2022-11-29 RX ORDER — SODIUM CHLORIDE 9 MG/ML
100 INJECTION, SOLUTION INTRAVENOUS CONTINUOUS
Status: DISCONTINUED | OUTPATIENT
Start: 2022-11-29 | End: 2022-12-02

## 2022-11-29 RX ORDER — ACETAMINOPHEN 650 MG/1
650 SUPPOSITORY RECTAL EVERY 4 HOURS PRN
Status: DISCONTINUED | OUTPATIENT
Start: 2022-11-29 | End: 2022-12-04 | Stop reason: HOSPADM

## 2022-11-29 RX ORDER — POTASSIUM CHLORIDE 20 MEQ/1
40 TABLET, EXTENDED RELEASE ORAL AS NEEDED
Status: DISCONTINUED | OUTPATIENT
Start: 2022-11-29 | End: 2022-12-04 | Stop reason: HOSPADM

## 2022-11-29 RX ORDER — ACETAMINOPHEN 325 MG/1
650 TABLET ORAL EVERY 4 HOURS PRN
Status: DISCONTINUED | OUTPATIENT
Start: 2022-11-29 | End: 2022-12-04 | Stop reason: HOSPADM

## 2022-11-29 RX ORDER — ONDANSETRON 4 MG/1
4 TABLET, FILM COATED ORAL EVERY 6 HOURS PRN
Status: DISCONTINUED | OUTPATIENT
Start: 2022-11-29 | End: 2022-12-04 | Stop reason: HOSPADM

## 2022-11-29 RX ORDER — SODIUM CHLORIDE 0.9 % (FLUSH) 0.9 %
10 SYRINGE (ML) INJECTION AS NEEDED
Status: DISCONTINUED | OUTPATIENT
Start: 2022-11-29 | End: 2022-12-04 | Stop reason: HOSPADM

## 2022-11-29 RX ORDER — ACETAMINOPHEN 160 MG/5ML
650 SOLUTION ORAL EVERY 4 HOURS PRN
Status: DISCONTINUED | OUTPATIENT
Start: 2022-11-29 | End: 2022-12-04 | Stop reason: HOSPADM

## 2022-11-29 RX ORDER — MAGNESIUM SULFATE HEPTAHYDRATE 40 MG/ML
2 INJECTION, SOLUTION INTRAVENOUS AS NEEDED
Status: DISCONTINUED | OUTPATIENT
Start: 2022-11-29 | End: 2022-12-04 | Stop reason: HOSPADM

## 2022-11-29 RX ORDER — SODIUM CHLORIDE 0.9 % (FLUSH) 0.9 %
10 SYRINGE (ML) INJECTION EVERY 12 HOURS SCHEDULED
Status: DISCONTINUED | OUTPATIENT
Start: 2022-11-29 | End: 2022-12-04 | Stop reason: HOSPADM

## 2022-11-29 RX ORDER — GUAIFENESIN 600 MG/1
1200 TABLET, EXTENDED RELEASE ORAL EVERY 12 HOURS SCHEDULED
Status: DISCONTINUED | OUTPATIENT
Start: 2022-11-29 | End: 2022-12-04 | Stop reason: HOSPADM

## 2022-11-29 RX ORDER — KETOROLAC TROMETHAMINE 15 MG/ML
15 INJECTION, SOLUTION INTRAMUSCULAR; INTRAVENOUS ONCE
Status: COMPLETED | OUTPATIENT
Start: 2022-11-29 | End: 2022-11-29

## 2022-11-29 RX ORDER — HYDROCODONE BITARTRATE AND HOMATROPINE METHYLBROMIDE ORAL SOLUTION 5; 1.5 MG/5ML; MG/5ML
5 LIQUID ORAL EVERY 4 HOURS PRN
Status: DISCONTINUED | OUTPATIENT
Start: 2022-11-29 | End: 2022-12-04 | Stop reason: HOSPADM

## 2022-11-29 RX ORDER — NITROGLYCERIN 0.4 MG/1
0.4 TABLET SUBLINGUAL
Status: DISCONTINUED | OUTPATIENT
Start: 2022-11-29 | End: 2022-12-04 | Stop reason: HOSPADM

## 2022-11-29 RX ADMIN — HYDROCODONE BITARTRATE AND HOMATROPINE METHYLBROMIDE 5 ML: 1.5; 5 SYRUP ORAL at 12:00

## 2022-11-29 RX ADMIN — IOPAMIDOL 70 ML: 755 INJECTION, SOLUTION INTRAVENOUS at 01:22

## 2022-11-29 RX ADMIN — BENZONATATE 200 MG: 100 CAPSULE ORAL at 15:35

## 2022-11-29 RX ADMIN — HYDROCODONE BITARTRATE AND HOMATROPINE METHYLBROMIDE 5 ML: 1.5; 5 SYRUP ORAL at 03:49

## 2022-11-29 RX ADMIN — ONDANSETRON 4 MG: 2 INJECTION INTRAMUSCULAR; INTRAVENOUS at 06:18

## 2022-11-29 RX ADMIN — SODIUM CHLORIDE 100 ML/HR: 9 INJECTION, SOLUTION INTRAVENOUS at 20:44

## 2022-11-29 RX ADMIN — SODIUM CHLORIDE 100 ML/HR: 9 INJECTION, SOLUTION INTRAVENOUS at 12:00

## 2022-11-29 RX ADMIN — PHENOL 1 SPRAY: 1.4 SPRAY ORAL at 15:35

## 2022-11-29 RX ADMIN — HYDROCODONE BITARTRATE AND HOMATROPINE METHYLBROMIDE 5 ML: 1.5; 5 SYRUP ORAL at 07:46

## 2022-11-29 RX ADMIN — HYDROCODONE BITARTRATE AND HOMATROPINE METHYLBROMIDE 5 ML: 1.5; 5 SYRUP ORAL at 20:44

## 2022-11-29 RX ADMIN — Medication 5 MG: at 20:44

## 2022-11-29 RX ADMIN — BENZONATATE 200 MG: 100 CAPSULE ORAL at 06:20

## 2022-11-29 RX ADMIN — ACETAMINOPHEN 650 MG: 650 SOLUTION ORAL at 12:00

## 2022-11-29 RX ADMIN — KETOROLAC TROMETHAMINE 15 MG: 15 INJECTION, SOLUTION INTRAMUSCULAR; INTRAVENOUS at 02:24

## 2022-11-29 RX ADMIN — SODIUM CHLORIDE 100 ML/HR: 9 INJECTION, SOLUTION INTRAVENOUS at 06:20

## 2022-11-29 RX ADMIN — Medication 10 ML: at 20:44

## 2022-11-29 RX ADMIN — ACETAMINOPHEN 650 MG: 325 TABLET, FILM COATED ORAL at 20:44

## 2022-11-30 LAB
ALBUMIN SERPL-MCNC: 3.6 G/DL (ref 3.5–5.2)
ALBUMIN/GLOB SERPL: 1.2 G/DL
ALP SERPL-CCNC: 77 U/L (ref 39–117)
ALT SERPL W P-5'-P-CCNC: 9 U/L (ref 1–33)
ANION GAP SERPL CALCULATED.3IONS-SCNC: 10 MMOL/L (ref 5–15)
AST SERPL-CCNC: 18 U/L (ref 1–32)
BASOPHILS # BLD AUTO: 0 10*3/MM3 (ref 0–0.2)
BASOPHILS NFR BLD AUTO: 0.3 % (ref 0–1.5)
BILIRUB SERPL-MCNC: 0.3 MG/DL (ref 0–1.2)
BUN SERPL-MCNC: 24 MG/DL (ref 8–23)
BUN/CREAT SERPL: 23.5 (ref 7–25)
CALCIUM SPEC-SCNC: 8.6 MG/DL (ref 8.6–10.5)
CHLORIDE SERPL-SCNC: 109 MMOL/L (ref 98–107)
CO2 SERPL-SCNC: 21 MMOL/L (ref 22–29)
CREAT SERPL-MCNC: 1.02 MG/DL (ref 0.57–1)
DEPRECATED RDW RBC AUTO: 44.2 FL (ref 37–54)
EGFRCR SERPLBLD CKD-EPI 2021: 61.6 ML/MIN/1.73
EOSINOPHIL # BLD AUTO: 0.1 10*3/MM3 (ref 0–0.4)
EOSINOPHIL NFR BLD AUTO: 1.9 % (ref 0.3–6.2)
ERYTHROCYTE [DISTWIDTH] IN BLOOD BY AUTOMATED COUNT: 14.5 % (ref 12.3–15.4)
GLOBULIN UR ELPH-MCNC: 3 GM/DL
GLUCOSE SERPL-MCNC: 106 MG/DL (ref 65–99)
HCT VFR BLD AUTO: 31 % (ref 34–46.6)
HGB BLD-MCNC: 10.2 G/DL (ref 12–15.9)
LYMPHOCYTES # BLD AUTO: 1.5 10*3/MM3 (ref 0.7–3.1)
LYMPHOCYTES NFR BLD AUTO: 27 % (ref 19.6–45.3)
MCH RBC QN AUTO: 29 PG (ref 26.6–33)
MCHC RBC AUTO-ENTMCNC: 32.9 G/DL (ref 31.5–35.7)
MCV RBC AUTO: 88.2 FL (ref 79–97)
MONOCYTES # BLD AUTO: 0.8 10*3/MM3 (ref 0.1–0.9)
MONOCYTES NFR BLD AUTO: 14 % (ref 5–12)
NEUTROPHILS NFR BLD AUTO: 3.2 10*3/MM3 (ref 1.7–7)
NEUTROPHILS NFR BLD AUTO: 56.8 % (ref 42.7–76)
NRBC BLD AUTO-RTO: 0 /100 WBC (ref 0–0.2)
PHOSPHATE SERPL-MCNC: 3 MG/DL (ref 2.5–4.5)
PLATELET # BLD AUTO: 166 10*3/MM3 (ref 140–450)
PMV BLD AUTO: 8.3 FL (ref 6–12)
POTASSIUM SERPL-SCNC: 4.3 MMOL/L (ref 3.5–5.2)
PROT SERPL-MCNC: 6.6 G/DL (ref 6–8.5)
RBC # BLD AUTO: 3.52 10*6/MM3 (ref 3.77–5.28)
SODIUM SERPL-SCNC: 140 MMOL/L (ref 136–145)
WBC NRBC COR # BLD: 5.5 10*3/MM3 (ref 3.4–10.8)

## 2022-11-30 PROCEDURE — 96361 HYDRATE IV INFUSION ADD-ON: CPT

## 2022-11-30 PROCEDURE — 80053 COMPREHEN METABOLIC PANEL: CPT | Performed by: INTERNAL MEDICINE

## 2022-11-30 PROCEDURE — 84100 ASSAY OF PHOSPHORUS: CPT | Performed by: INTERNAL MEDICINE

## 2022-11-30 PROCEDURE — G0378 HOSPITAL OBSERVATION PER HR: HCPCS

## 2022-11-30 PROCEDURE — 63710000001 ONDANSETRON PER 8 MG: Performed by: PHYSICIAN ASSISTANT

## 2022-11-30 PROCEDURE — 85025 COMPLETE CBC W/AUTO DIFF WBC: CPT | Performed by: INTERNAL MEDICINE

## 2022-11-30 RX ORDER — HYDROXYZINE HYDROCHLORIDE 25 MG/1
25 TABLET, FILM COATED ORAL 3 TIMES DAILY PRN
Status: DISCONTINUED | OUTPATIENT
Start: 2022-11-30 | End: 2022-12-04 | Stop reason: HOSPADM

## 2022-11-30 RX ORDER — BUSPIRONE HYDROCHLORIDE 15 MG/1
15 TABLET ORAL 3 TIMES DAILY
Status: DISCONTINUED | OUTPATIENT
Start: 2022-11-30 | End: 2022-12-04 | Stop reason: HOSPADM

## 2022-11-30 RX ORDER — IPRATROPIUM BROMIDE AND ALBUTEROL SULFATE 2.5; .5 MG/3ML; MG/3ML
3 SOLUTION RESPIRATORY (INHALATION) EVERY 4 HOURS PRN
Status: DISCONTINUED | OUTPATIENT
Start: 2022-11-30 | End: 2022-12-04 | Stop reason: HOSPADM

## 2022-11-30 RX ORDER — OSELTAMIVIR PHOSPHATE 6 MG/ML
30 FOR SUSPENSION ORAL EVERY 12 HOURS SCHEDULED
Status: DISCONTINUED | OUTPATIENT
Start: 2022-11-30 | End: 2022-12-02

## 2022-11-30 RX ORDER — OSELTAMIVIR PHOSPHATE 75 MG/1
75 CAPSULE ORAL ONCE
Status: COMPLETED | OUTPATIENT
Start: 2022-11-30 | End: 2022-11-30

## 2022-11-30 RX ORDER — TRAZODONE HYDROCHLORIDE 50 MG/1
50 TABLET ORAL NIGHTLY PRN
Status: DISCONTINUED | OUTPATIENT
Start: 2022-11-30 | End: 2022-12-04 | Stop reason: HOSPADM

## 2022-11-30 RX ORDER — TOPIRAMATE 25 MG/1
50 TABLET ORAL 2 TIMES DAILY
Status: DISCONTINUED | OUTPATIENT
Start: 2022-11-30 | End: 2022-12-04 | Stop reason: HOSPADM

## 2022-11-30 RX ORDER — DOXYCYCLINE 100 MG/1
100 TABLET ORAL EVERY 12 HOURS SCHEDULED
Status: DISCONTINUED | OUTPATIENT
Start: 2022-11-30 | End: 2022-12-04 | Stop reason: HOSPADM

## 2022-11-30 RX ORDER — ATORVASTATIN CALCIUM 40 MG/1
40 TABLET, FILM COATED ORAL NIGHTLY
Status: DISCONTINUED | OUTPATIENT
Start: 2022-11-30 | End: 2022-12-04 | Stop reason: HOSPADM

## 2022-11-30 RX ORDER — ESCITALOPRAM OXALATE 10 MG/1
20 TABLET ORAL DAILY
Status: DISCONTINUED | OUTPATIENT
Start: 2022-11-30 | End: 2022-12-04 | Stop reason: HOSPADM

## 2022-11-30 RX ORDER — SUMATRIPTAN 50 MG/1
50 TABLET, FILM COATED ORAL
Status: DISCONTINUED | OUTPATIENT
Start: 2022-11-30 | End: 2022-12-04 | Stop reason: HOSPADM

## 2022-11-30 RX ADMIN — ESCITALOPRAM OXALATE 20 MG: 10 TABLET ORAL at 09:39

## 2022-11-30 RX ADMIN — BUSPIRONE HYDROCHLORIDE 15 MG: 15 TABLET ORAL at 20:07

## 2022-11-30 RX ADMIN — BUSPIRONE HYDROCHLORIDE 15 MG: 15 TABLET ORAL at 17:00

## 2022-11-30 RX ADMIN — DOXYCYCLINE 100 MG: 100 TABLET ORAL at 20:07

## 2022-11-30 RX ADMIN — ONDANSETRON HYDROCHLORIDE 4 MG: 4 TABLET, FILM COATED ORAL at 17:04

## 2022-11-30 RX ADMIN — SODIUM CHLORIDE 100 ML/HR: 9 INJECTION, SOLUTION INTRAVENOUS at 09:39

## 2022-11-30 RX ADMIN — OSELTAMIVIR PHOSPHATE 75 MG: 75 CAPSULE ORAL at 09:40

## 2022-11-30 RX ADMIN — TOPIRAMATE 50 MG: 25 TABLET, FILM COATED ORAL at 20:07

## 2022-11-30 RX ADMIN — ACETAMINOPHEN 650 MG: 325 TABLET, FILM COATED ORAL at 04:07

## 2022-11-30 RX ADMIN — HYDROCODONE BITARTRATE AND HOMATROPINE METHYLBROMIDE 5 ML: 1.5; 5 SYRUP ORAL at 10:25

## 2022-11-30 RX ADMIN — BUSPIRONE HYDROCHLORIDE 15 MG: 15 TABLET ORAL at 09:39

## 2022-11-30 RX ADMIN — Medication 10 ML: at 20:06

## 2022-11-30 RX ADMIN — ATORVASTATIN CALCIUM 40 MG: 40 TABLET, FILM COATED ORAL at 20:07

## 2022-11-30 RX ADMIN — HYDROCODONE BITARTRATE AND HOMATROPINE METHYLBROMIDE 5 ML: 1.5; 5 SYRUP ORAL at 04:10

## 2022-11-30 RX ADMIN — OSELTAMIVIR PHOSPHATE 30 MG: 6 FOR SUSPENSION ORAL at 20:07

## 2022-11-30 RX ADMIN — TOPIRAMATE 50 MG: 25 TABLET, FILM COATED ORAL at 09:40

## 2022-11-30 RX ADMIN — Medication 10 ML: at 09:44

## 2022-11-30 RX ADMIN — DOXYCYCLINE 100 MG: 100 TABLET ORAL at 09:39

## 2022-12-01 LAB
ALBUMIN SERPL-MCNC: 3.3 G/DL (ref 3.5–5.2)
ALBUMIN SERPL-MCNC: 3.4 G/DL (ref 3.5–5.2)
ALBUMIN/GLOB SERPL: 1.2 G/DL
ALBUMIN/GLOB SERPL: 1.3 G/DL
ALP SERPL-CCNC: 70 U/L (ref 39–117)
ALP SERPL-CCNC: 71 U/L (ref 39–117)
ALT SERPL W P-5'-P-CCNC: 11 U/L (ref 1–33)
ALT SERPL W P-5'-P-CCNC: 9 U/L (ref 1–33)
ANION GAP SERPL CALCULATED.3IONS-SCNC: 10 MMOL/L (ref 5–15)
ANION GAP SERPL CALCULATED.3IONS-SCNC: 7 MMOL/L (ref 5–15)
AST SERPL-CCNC: 17 U/L (ref 1–32)
AST SERPL-CCNC: 17 U/L (ref 1–32)
BASOPHILS # BLD AUTO: 0 10*3/MM3 (ref 0–0.2)
BASOPHILS # BLD AUTO: 0 10*3/MM3 (ref 0–0.2)
BASOPHILS NFR BLD AUTO: 0.2 % (ref 0–1.5)
BASOPHILS NFR BLD AUTO: 0.2 % (ref 0–1.5)
BILIRUB SERPL-MCNC: 0.3 MG/DL (ref 0–1.2)
BILIRUB SERPL-MCNC: 0.3 MG/DL (ref 0–1.2)
BUN SERPL-MCNC: 15 MG/DL (ref 8–23)
BUN SERPL-MCNC: 21 MG/DL (ref 8–23)
BUN/CREAT SERPL: 15.6 (ref 7–25)
BUN/CREAT SERPL: 18.3 (ref 7–25)
CALCIUM SPEC-SCNC: 8.3 MG/DL (ref 8.6–10.5)
CALCIUM SPEC-SCNC: 8.4 MG/DL (ref 8.6–10.5)
CHLORIDE SERPL-SCNC: 113 MMOL/L (ref 98–107)
CHLORIDE SERPL-SCNC: 114 MMOL/L (ref 98–107)
CO2 SERPL-SCNC: 21 MMOL/L (ref 22–29)
CO2 SERPL-SCNC: 22 MMOL/L (ref 22–29)
CREAT SERPL-MCNC: 0.96 MG/DL (ref 0.57–1)
CREAT SERPL-MCNC: 1.15 MG/DL (ref 0.57–1)
DEPRECATED RDW RBC AUTO: 45.5 FL (ref 37–54)
DEPRECATED RDW RBC AUTO: 46.4 FL (ref 37–54)
EGFRCR SERPLBLD CKD-EPI 2021: 53.3 ML/MIN/1.73
EGFRCR SERPLBLD CKD-EPI 2021: 66.2 ML/MIN/1.73
EOSINOPHIL # BLD AUTO: 0.2 10*3/MM3 (ref 0–0.4)
EOSINOPHIL # BLD AUTO: 0.2 10*3/MM3 (ref 0–0.4)
EOSINOPHIL NFR BLD AUTO: 3.7 % (ref 0.3–6.2)
EOSINOPHIL NFR BLD AUTO: 4.2 % (ref 0.3–6.2)
ERYTHROCYTE [DISTWIDTH] IN BLOOD BY AUTOMATED COUNT: 14.7 % (ref 12.3–15.4)
ERYTHROCYTE [DISTWIDTH] IN BLOOD BY AUTOMATED COUNT: 14.8 % (ref 12.3–15.4)
GLOBULIN UR ELPH-MCNC: 2.7 GM/DL
GLOBULIN UR ELPH-MCNC: 2.8 GM/DL
GLUCOSE SERPL-MCNC: 103 MG/DL (ref 65–99)
GLUCOSE SERPL-MCNC: 114 MG/DL (ref 65–99)
HCT VFR BLD AUTO: 26.7 % (ref 34–46.6)
HCT VFR BLD AUTO: 28 % (ref 34–46.6)
HGB BLD-MCNC: 8.8 G/DL (ref 12–15.9)
HGB BLD-MCNC: 9.1 G/DL (ref 12–15.9)
LYMPHOCYTES # BLD AUTO: 1.6 10*3/MM3 (ref 0.7–3.1)
LYMPHOCYTES # BLD AUTO: 1.7 10*3/MM3 (ref 0.7–3.1)
LYMPHOCYTES NFR BLD AUTO: 32.2 % (ref 19.6–45.3)
LYMPHOCYTES NFR BLD AUTO: 40.2 % (ref 19.6–45.3)
MCH RBC QN AUTO: 28.3 PG (ref 26.6–33)
MCH RBC QN AUTO: 28.4 PG (ref 26.6–33)
MCHC RBC AUTO-ENTMCNC: 32.5 G/DL (ref 31.5–35.7)
MCHC RBC AUTO-ENTMCNC: 33.1 G/DL (ref 31.5–35.7)
MCV RBC AUTO: 85.6 FL (ref 79–97)
MCV RBC AUTO: 87.5 FL (ref 79–97)
MONOCYTES # BLD AUTO: 0.5 10*3/MM3 (ref 0.1–0.9)
MONOCYTES # BLD AUTO: 0.7 10*3/MM3 (ref 0.1–0.9)
MONOCYTES NFR BLD AUTO: 12.6 % (ref 5–12)
MONOCYTES NFR BLD AUTO: 13.5 % (ref 5–12)
NEUTROPHILS NFR BLD AUTO: 1.9 10*3/MM3 (ref 1.7–7)
NEUTROPHILS NFR BLD AUTO: 2.4 10*3/MM3 (ref 1.7–7)
NEUTROPHILS NFR BLD AUTO: 42.8 % (ref 42.7–76)
NEUTROPHILS NFR BLD AUTO: 50.4 % (ref 42.7–76)
NRBC BLD AUTO-RTO: 0 /100 WBC (ref 0–0.2)
NRBC BLD AUTO-RTO: 0.1 /100 WBC (ref 0–0.2)
PLATELET # BLD AUTO: 151 10*3/MM3 (ref 140–450)
PLATELET # BLD AUTO: 160 10*3/MM3 (ref 140–450)
PMV BLD AUTO: 8.1 FL (ref 6–12)
PMV BLD AUTO: 8.3 FL (ref 6–12)
POTASSIUM SERPL-SCNC: 4.3 MMOL/L (ref 3.5–5.2)
POTASSIUM SERPL-SCNC: 4.3 MMOL/L (ref 3.5–5.2)
PROT SERPL-MCNC: 6.1 G/DL (ref 6–8.5)
PROT SERPL-MCNC: 6.1 G/DL (ref 6–8.5)
RBC # BLD AUTO: 3.11 10*6/MM3 (ref 3.77–5.28)
RBC # BLD AUTO: 3.2 10*6/MM3 (ref 3.77–5.28)
SODIUM SERPL-SCNC: 143 MMOL/L (ref 136–145)
SODIUM SERPL-SCNC: 144 MMOL/L (ref 136–145)
WBC NRBC COR # BLD: 4.3 10*3/MM3 (ref 3.4–10.8)
WBC NRBC COR # BLD: 4.8 10*3/MM3 (ref 3.4–10.8)

## 2022-12-01 PROCEDURE — 25010000002 ONDANSETRON PER 1 MG: Performed by: PHYSICIAN ASSISTANT

## 2022-12-01 PROCEDURE — 96376 TX/PRO/DX INJ SAME DRUG ADON: CPT

## 2022-12-01 PROCEDURE — 80053 COMPREHEN METABOLIC PANEL: CPT | Performed by: INTERNAL MEDICINE

## 2022-12-01 PROCEDURE — G0378 HOSPITAL OBSERVATION PER HR: HCPCS

## 2022-12-01 PROCEDURE — 63710000001 ONDANSETRON PER 8 MG: Performed by: PHYSICIAN ASSISTANT

## 2022-12-01 PROCEDURE — 85025 COMPLETE CBC W/AUTO DIFF WBC: CPT | Performed by: INTERNAL MEDICINE

## 2022-12-01 RX ADMIN — HYDROCODONE BITARTRATE AND HOMATROPINE METHYLBROMIDE 5 ML: 1.5; 5 SYRUP ORAL at 05:50

## 2022-12-01 RX ADMIN — TOPIRAMATE 50 MG: 25 TABLET, FILM COATED ORAL at 10:02

## 2022-12-01 RX ADMIN — METOPROLOL TARTRATE 75 MG: 50 TABLET, FILM COATED ORAL at 21:08

## 2022-12-01 RX ADMIN — TOPIRAMATE 50 MG: 25 TABLET, FILM COATED ORAL at 21:08

## 2022-12-01 RX ADMIN — Medication 10 ML: at 10:05

## 2022-12-01 RX ADMIN — BUSPIRONE HYDROCHLORIDE 15 MG: 15 TABLET ORAL at 15:41

## 2022-12-01 RX ADMIN — DOXYCYCLINE 100 MG: 100 TABLET ORAL at 10:02

## 2022-12-01 RX ADMIN — BENZONATATE 200 MG: 100 CAPSULE ORAL at 02:03

## 2022-12-01 RX ADMIN — BUSPIRONE HYDROCHLORIDE 15 MG: 15 TABLET ORAL at 21:09

## 2022-12-01 RX ADMIN — SODIUM CHLORIDE 100 ML/HR: 9 INJECTION, SOLUTION INTRAVENOUS at 10:04

## 2022-12-01 RX ADMIN — METOPROLOL TARTRATE 75 MG: 50 TABLET, FILM COATED ORAL at 10:02

## 2022-12-01 RX ADMIN — ONDANSETRON 4 MG: 2 INJECTION INTRAMUSCULAR; INTRAVENOUS at 19:24

## 2022-12-01 RX ADMIN — ESCITALOPRAM OXALATE 20 MG: 10 TABLET ORAL at 10:02

## 2022-12-01 RX ADMIN — Medication 10 ML: at 21:12

## 2022-12-01 RX ADMIN — BUSPIRONE HYDROCHLORIDE 15 MG: 15 TABLET ORAL at 10:02

## 2022-12-01 RX ADMIN — ATORVASTATIN CALCIUM 40 MG: 40 TABLET, FILM COATED ORAL at 21:08

## 2022-12-01 RX ADMIN — DOXYCYCLINE 100 MG: 100 TABLET ORAL at 21:08

## 2022-12-01 RX ADMIN — ONDANSETRON HYDROCHLORIDE 4 MG: 4 TABLET, FILM COATED ORAL at 02:05

## 2022-12-01 RX ADMIN — OSELTAMIVIR PHOSPHATE 30 MG: 6 FOR SUSPENSION ORAL at 21:09

## 2022-12-01 NOTE — PROGRESS NOTES
[facility] Medicine Services Daily Progress Note    Patient Name: Andressa Marks  : 1960  MRN: [id number]  Primary Care Physician:  Fadia Beltran  Date of admission: 2022      Subjective      Chief Complaint:  Cough sore throat      Patient Reports       Has persistent cough  Has vomiting no diarrhea  Labs ordered for today              Review of Systems   All other systems reviewed and are negative.           Objective      Vitals:   Temp:  [98.2 °F (36.8 °C)-98.7 °F (37.1 °C)] 98.6 °F (37 °C)  Heart Rate:  [63-98] 66  Resp:  [16-20] 18  BP: ()/(55-71) 125/66    Physical Exam     GENERAL APPEARANCE: Well developed, well nourished, alert and cooperative, and appears to be in no acute distress.  HEAD: normocephalic.  EYES: PERRL, EOMI. vision intact grossly.  EARS: Intact hearing.  No gross abnormalities.  NOSE: No nasal discharge.  THROAT: Clear   NECK: Neck supple, non-tender without lymphadenopathy, masses or thyromegaly.  CARDIAC: Normal S1 and S2. No S3, S4 or murmurs. Rhythm is regular. There is no peripheral edema, cyanosis or pallor. Extremities are warm and well perfused. Capillary refill is less than 2 seconds. No carotid bruits.  LUNGS: Clear to auscultation and percussion without rales, rhonchi, wheezing or diminished breath sounds.  ABDOMEN: Positive bowel sounds. Soft, nondistended, nontender. No guarding or rebound. No masses.  MUSKULOSKELETAL: No deformity or swelling   BACK: No abnormalities noted     EXTREMITIES: No significant deformity or joint abnormality. No edema. Peripheral pulses intact. No varicosities.  LOWER EXTREMITY: No edema or swelling  NEUROLOGICAL: CN II-XII intact. Strength and sensation symmetric and intact throughout. Reflexes 2+ throughout. Cerebellar testing normal.  SKIN: Skin normal color, texture and turgor with no lesions or eruptions.  PSYCHIATRIC: Alert cooperative not suicidal          Result Review    Result Review:  I  have personally reviewed the results from the time of this admission to 12/1/2022 14:16 EST and agree with these findings:  []  Laboratory  []  Microbiology  []  Radiology  []  EKG/Telemetry   []  Cardiology/Vascular   []  Pathology  []  Old records  []  Other:  Most notable findings include:             Assessment & Plan      Brief Patient Summary:  Andressa Marks is a 62 y.o. female who         atorvastatin, 40 mg, Oral, Nightly  busPIRone, 15 mg, Oral, TID  doxycycline, 100 mg, Oral, Q12H  escitalopram, 20 mg, Oral, Daily  guaiFENesin, 1,200 mg, Oral, Q12H  metoprolol tartrate, 75 mg, Oral, BID  oseltamivir, 30 mg, Oral, Q12H  sodium chloride, 10 mL, Intravenous, Q12H  topiramate, 50 mg, Oral, BID       sodium chloride, 100 mL/hr, Last Rate: 100 mL/hr (12/01/22 1004)         Active Hospital Problems:  Active Hospital Problems    Diagnosis    • **IRENE (acute kidney injury) (HCC)    • Influenza A    • Anxiety    • Depressive disorder    • Hyperlipidemia    • Essential hypertension    • Migraine without aura and responsive to treatment      Plan:   History of Present Illness: Andressa Marks is a 62 y.o. female who presented to [facility] on 11/29/2022 complaining of cough and shortness of breath for the past 3 weeks.  Patient states that she was diagnosed with viral pneumonia and acute kidney injury when she was admitted to the hospital.  Patient states her cough is productive with brown sputum and had a temperature as high as 102 at home the last few days.  Patient states her cough got worse over the past 3 to 4 days.  Patient states that she is a never smoker.  Patient states because she has had a worsening sore throat the last few days that she has not wanted to eat or drink much.  Patient also reports some nausea and episode of vomiting yesterday.  Patient states she has had intermittent chest discomfort for the last few weeks as well exertional symptoms along with this.  Patient denies any diarrhea  or swelling in her legs bilaterally     In the ED, initial troponin negative, proBNP elevated 4000 which previously was 1000 about 1 month ago.  Serum creatinine 1.74 and BUN of 38 with previous serum creatinine baseline around 1.1.  Initial lactic normal at 0.7.  D-dimer elevated at 1.28.  Coags unremarkable.  CBC largely unremarkable for acute findings.  Influenza A positive.  Blood cultures x2 pending.  Chest x-ray shows no acute findings.  CT PE protocol shows no evidence of pulmonary embolism.  No evidence of thoracic aortic aneurysm or dissection.  No evidence of pneumonia, pleural or pericardial effusions.  EKG shows sinus rhythm 87 bpm, prolonged QT interval, no ST elevation. Last echo done on 1/21/2021 shows EF of 55% and.  LV diastolic dysfunction.  Mild aortic regurgitation.  Patient is afebrile, pulse in the 80s, on room air oxygen at 99% SPO2 and blood pressure 110s/50s.  Patient was given Toradol 1 L of LR in ED.  Patient was also given hycodan for cough.  Spoke with GUZMAN Ndiaye, and case was discussed and accepted patient behalf of Dr. SANDY Holman for admission to hospitalist team.       Assessment/plan    Shortness of breath and cough  -Likely secondary to influenza A bronchitis  -proBNP elevated 4000 which previously was 1000 about 1 month ago.    - Initial lactic normal at 0.7.   - D-dimer elevated at 1.28.    -CBC largely unremarkable for acute findings. Blood cultures x2 pending.      -Chest x-ray shows no acute findings.    -CT PE protocol shows no evidence of pulmonary embolism or acute finding.  - -Pro-Honorio-0.24-- p.o. doxycycline.  -Tessalon Perles and Mucinex ordered  -Continuous cardiac monitoring  -Heart healthy diet  -Tamiflu      IRENE  -Likely prerenal azotemia   -Serum creatinine 1.74 and BUN of 38 with previous serum creatinine baseline around 1.1.   -Continue 100 mL/h normal saline  -Recheck BMP     Sore throat  -rapid Strep  negative  -Chloraseptic spray ordered     Hyperlipidemia,  chronic  -Check lipid panel  -Continue home statin     Essential hypertension, chronic, controlled  -Continue ryann metoprolol  Hold losartan and HCTZ for IRENE    Anxiety/depression, chronic  -Continue home BuSpar, Lexapro, Atarax, Topamax     History of migraines, chronic  -Continue home Imitrex as needed          DVT prophylaxis:  Mechanical DVT prophylaxis orders are present.    CODE STATUS:    Code Status (Patient has no pulse and is not breathing): CPR (Attempt to Resuscitate)  Medical Interventions (Patient has pulse or is breathing): Full Support      Disposition:  I expect patient to be discharged home.    This patient has been examined wearing appropriate Personal Protective Equipment and discussed with hospital infection control department. 12/01/22      Electronically signed by Xiang Sanabria MD, 12/01/22, 14:16 EST.  [facility] Hospitalist Team

## 2022-12-01 NOTE — PLAN OF CARE
Goal Outcome Evaluation:  Patient admitted on 11/29 for testing positive for the flu. Patient has had no appetite, has a productive cough but refuses Mucinex. Will continue to monitor.

## 2022-12-01 NOTE — PLAN OF CARE
Problem: Adult Inpatient Plan of Care  Goal: Plan of Care Review  Outcome: Ongoing, Progressing  Goal: Patient-Specific Goal (Individualized)  Outcome: Ongoing, Progressing  Goal: Absence of Hospital-Acquired Illness or Injury  Outcome: Ongoing, Progressing  Intervention: Identify and Manage Fall Risk  Recent Flowsheet Documentation  Taken 12/1/2022 0220 by Grace Bolden RN  Safety Promotion/Fall Prevention: safety round/check completed  Taken 12/1/2022 0029 by Grace Boldne RN  Safety Promotion/Fall Prevention: safety round/check completed  Taken 11/30/2022 2237 by Grace Bolden RN  Safety Promotion/Fall Prevention: safety round/check completed  Taken 11/30/2022 2000 by Grace Bolden RN  Safety Promotion/Fall Prevention: safety round/check completed  Intervention: Prevent and Manage VTE (Venous Thromboembolism) Risk  Recent Flowsheet Documentation  Taken 11/30/2022 2000 by Grace Bolden RN  VTE Prevention/Management:   compression stockings off   sequential compression devices off  Range of Motion: active ROM (range of motion) encouraged  Intervention: Prevent Infection  Recent Flowsheet Documentation  Taken 11/30/2022 2000 by Grace Bolden RN  Infection Prevention:   equipment surfaces disinfected   hand hygiene promoted   personal protective equipment utilized  Goal: Optimal Comfort and Wellbeing  Outcome: Ongoing, Progressing  Intervention: Monitor Pain and Promote Comfort  Recent Flowsheet Documentation  Taken 11/30/2022 2000 by Grace Bolden RN  Pain Management Interventions:   see MAR   quiet environment facilitated  Intervention: Provide Person-Centered Care  Recent Flowsheet Documentation  Taken 11/30/2022 2000 by Grace Bolden RN  Trust Relationship/Rapport:   choices provided   care explained   questions answered   questions encouraged   thoughts/feelings acknowledged  Goal: Readiness for Transition of Care  Outcome: Ongoing, Progressing     Problem: Breathing  Pattern Ineffective  Goal: Effective Breathing Pattern  Outcome: Ongoing, Progressing  Intervention: Promote Improved Breathing Pattern  Recent Flowsheet Documentation  Taken 11/30/2022 2000 by Grace Bolden, RN  Breathing Techniques/Airway Clearance: deep/controlled cough encouraged     Problem: Pain Acute  Goal: Acceptable Pain Control and Functional Ability  Outcome: Ongoing, Progressing  Intervention: Prevent or Manage Pain  Recent Flowsheet Documentation  Taken 11/30/2022 2000 by Grace Bolden, RN  Medication Review/Management: medications reviewed  Intervention: Develop Pain Management Plan  Recent Flowsheet Documentation  Taken 11/30/2022 2000 by Grace Bolden, RN  Pain Management Interventions:   see MAR   quiet environment facilitated  Intervention: Optimize Psychosocial Wellbeing  Recent Flowsheet Documentation  Taken 11/30/2022 2000 by Grace Bolden, RN  Diversional Activities:   smartphone   television   Goal Outcome Evaluation:                 Patient given tessalon pearls for cough. Tamiflu and doxy given. Will continue to observe.

## 2022-12-01 NOTE — CASE MANAGEMENT/SOCIAL WORK
Continued Stay Note  [facility]     Patient Name: Andressa Marks  MRN: [id number]  Today's Date: 12/1/2022    Admit Date: 11/29/2022    Plan: D/C plan: Anticipate home   Discharge Plan     Row Name 12/01/22 1641       Plan    Plan D/C plan: Anticipate home    Patient/Family in Agreement with Plan yes    Plan Comments Anticipate home when medically ready.                   Expected Discharge Date and Time     Expected Discharge Date Expected Discharge Time    Dec 2, 2022         Phone communication or documentation only - no physical contact with patient or family.      Antione Short RN

## 2022-12-01 NOTE — PROGRESS NOTES
[facility] Progress Note      Patient Name: Andressa Marks  : 1960  MRN: [id number]  Primary Care Physician:  Fadia Beltran  Date of admission: 2022    Subjective     Interval History:     Overnight no event    Continues to have spells of cough w phlegm production  Decreased appetite  Feeling tired fatigue  Denies abdominal pain, nausea or emesis    Urinary spontaneously    Review of Systems:   As noted above    Objective     Vitals:   Temp:  [98.5 °F (36.9 °C)-98.9 °F (37.2 °C)] 98.5 °F (36.9 °C)  Heart Rate:  [63-98] 63  Resp:  [16-19] 18  BP: ()/(55-71) 123/55    Intake/Output Summary (Last 24 hours) at 2022 0734  Last data filed at 2022  Gross per 24 hour   Intake 720 ml   Output --   Net 720 ml       Physical Exam:    General Appearance: alert, oriented x 3, no acute distress   Skin: warm and dry  HEENT: oral mucosa normal, nonicteric sclera  Neck: supple, no JVD  Lungs: Fine crackles bibasal with expiratory wheezes  Heart: RRR, normal S1 and S2  Abdomen: soft, nontender, nondistended  : no palpable bladder  Extremities: no edema, cyanosis or clubbing  Neuro: normal speech and mental status     Scheduled Meds:     atorvastatin, 40 mg, Oral, Nightly  busPIRone, 15 mg, Oral, TID  doxycycline, 100 mg, Oral, Q12H  escitalopram, 20 mg, Oral, Daily  guaiFENesin, 1,200 mg, Oral, Q12H  metoprolol tartrate, 75 mg, Oral, BID  oseltamivir, 30 mg, Oral, Q12H  sodium chloride, 10 mL, Intravenous, Q12H  topiramate, 50 mg, Oral, BID      IV Meds:   sodium chloride, 100 mL/hr, Last Rate: 100 mL/hr (22 0939)        Results Reviewed:   I have personally reviewed the results from the time of this admission to 2022 07:34 EST     Results from last 7 days   Lab Units 22  2352 22  0818 22  0916 22  2256   SODIUM mmol/L 144 140 140 139   POTASSIUM mmol/L 4.3 4.3 4.6 3.8   CHLORIDE mmol/L 113* 109* 107 103   CO2 mmol/L 21.0*  21.0* 21.0* 18.0*   BUN mg/dL 21 24* 36* 38*   CREATININE mg/dL 1.15* 1.02* 1.34* 1.74*   CALCIUM mg/dL 8.3* 8.6 9.2 9.6   BILIRUBIN mg/dL 0.3 0.3  --  0.3   ALK PHOS U/L 71 77  --  109   ALT (SGPT) U/L 11 9  --  16   AST (SGOT) U/L 17 18  --  26   GLUCOSE mg/dL 114* 106* 105* 129*       Estimated Creatinine Clearance: 49.1 mL/min (A) (by C-G formula based on SCr of 1.15 mg/dL (H)).    Results from last 7 days   Lab Units 11/30/22  0818   PHOSPHORUS mg/dL 3.0             Results from last 7 days   Lab Units 11/30/22  2352 11/30/22  0915 11/29/22  0916 11/28/22  2256   WBC 10*3/mm3 4.80 5.50 6.20 6.80   HEMOGLOBIN g/dL 9.1* 10.2* 11.2* 12.1   PLATELETS 10*3/mm3 160 166 178 237       Results from last 7 days   Lab Units 11/28/22  2256   INR  1.02       Assessment / Plan     ASSESSMENT:    - Non oliguric Acute kidney injury Baseline creatinine was normal creatinine up to 1.7 from 1.0 on 11/6/2022.  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 Creatinine trending down with IV hydration  .A previous CT scan abdomen pelvis in 2021  kidneys unremarkable.  Urinalysis bland.  Creatinine trend 1.7 > 1.3 > 1.1  - Influenza A pneumonia.  On supplemental oxygen, inhalers, followed closely by respiratory therapy and primary team .currently on Tamiflu    PLAN:    -Follow renal function closely, slowly improving.  Volume status and electrolytes stable   -Avoid nephrotoxins  - Will order renal panel   -Adjust medications to GFR       Thank you for involving us in the care of Andressa Marks.  Please feel free to call with any questions.    Marcial Shukla MD  12/01/22  07:34 [facility]    Nephrology [facility]  [phone]    Please note that portions of this note were completed with a voice recognition program.

## 2022-12-02 LAB
ABO GROUP BLD: NORMAL
ALBUMIN SERPL-MCNC: 3 G/DL (ref 3.5–5.2)
ALBUMIN/GLOB SERPL: 1 G/DL
ALP SERPL-CCNC: 76 U/L (ref 39–117)
ALT SERPL W P-5'-P-CCNC: 11 U/L (ref 1–33)
ANION GAP SERPL CALCULATED.3IONS-SCNC: 11 MMOL/L (ref 5–15)
AST SERPL-CCNC: 24 U/L (ref 1–32)
BASOPHILS # BLD AUTO: 0 10*3/MM3 (ref 0–0.2)
BASOPHILS NFR BLD AUTO: 0.3 % (ref 0–1.5)
BILIRUB CONJ SERPL-MCNC: <0.2 MG/DL (ref 0–0.3)
BILIRUB INDIRECT SERPL-MCNC: NORMAL MG/DL
BILIRUB SERPL-MCNC: 0.2 MG/DL (ref 0–1.2)
BILIRUB SERPL-MCNC: 0.3 MG/DL (ref 0–1.2)
BLD GP AB SCN SERPL QL: NEGATIVE
BUN SERPL-MCNC: 14 MG/DL (ref 8–23)
BUN/CREAT SERPL: 16.5 (ref 7–25)
CALCIUM SPEC-SCNC: 8.3 MG/DL (ref 8.6–10.5)
CHLORIDE SERPL-SCNC: 115 MMOL/L (ref 98–107)
CO2 SERPL-SCNC: 18 MMOL/L (ref 22–29)
CREAT SERPL-MCNC: 0.85 MG/DL (ref 0.57–1)
DEPRECATED RDW RBC AUTO: 52.5 FL (ref 37–54)
EGFRCR SERPLBLD CKD-EPI 2021: 76.6 ML/MIN/1.73
EOSINOPHIL # BLD AUTO: 0.2 10*3/MM3 (ref 0–0.4)
EOSINOPHIL NFR BLD AUTO: 4.9 % (ref 0.3–6.2)
ERYTHROCYTE [DISTWIDTH] IN BLOOD BY AUTOMATED COUNT: 15.3 % (ref 12.3–15.4)
FERRITIN SERPL-MCNC: 177.7 NG/ML (ref 13–150)
FOLATE SERPL-MCNC: >20 NG/ML (ref 4.78–24.2)
GLOBULIN UR ELPH-MCNC: 3 GM/DL
GLUCOSE SERPL-MCNC: 100 MG/DL (ref 65–99)
HAPTOGLOB SERPL-MCNC: 277 MG/DL (ref 30–200)
HCT VFR BLD AUTO: 29 % (ref 34–46.6)
HGB BLD-MCNC: 9.1 G/DL (ref 12–15.9)
IRON 24H UR-MRATE: 49 MCG/DL (ref 37–145)
IRON SATN MFR SERPL: 20 % (ref 20–50)
LDH SERPL-CCNC: 200 U/L (ref 135–214)
LYMPHOCYTES # BLD AUTO: 1.8 10*3/MM3 (ref 0.7–3.1)
LYMPHOCYTES NFR BLD AUTO: 36.8 % (ref 19.6–45.3)
MCH RBC QN AUTO: 28.8 PG (ref 26.6–33)
MCHC RBC AUTO-ENTMCNC: 31.4 G/DL (ref 31.5–35.7)
MCV RBC AUTO: 91.7 FL (ref 79–97)
MONOCYTES # BLD AUTO: 0.6 10*3/MM3 (ref 0.1–0.9)
MONOCYTES NFR BLD AUTO: 11.2 % (ref 5–12)
NEUTROPHILS NFR BLD AUTO: 2.3 10*3/MM3 (ref 1.7–7)
NEUTROPHILS NFR BLD AUTO: 46.8 % (ref 42.7–76)
NRBC BLD AUTO-RTO: 0.1 /100 WBC (ref 0–0.2)
PHOSPHATE SERPL-MCNC: 2.2 MG/DL (ref 2.5–4.5)
PLATELET # BLD AUTO: 155 10*3/MM3 (ref 140–450)
PMV BLD AUTO: 8.7 FL (ref 6–12)
POTASSIUM SERPL-SCNC: 4 MMOL/L (ref 3.5–5.2)
PROT SERPL-MCNC: 6 G/DL (ref 6–8.5)
RBC # BLD AUTO: 3.16 10*6/MM3 (ref 3.77–5.28)
RETICS # AUTO: 0.02 10*6/MM3 (ref 0.02–0.13)
RETICS/RBC NFR AUTO: 0.73 % (ref 0.7–1.9)
RH BLD: POSITIVE
SODIUM SERPL-SCNC: 144 MMOL/L (ref 136–145)
T&S EXPIRATION DATE: NORMAL
TIBC SERPL-MCNC: 249 MCG/DL (ref 298–536)
TRANSFERRIN SERPL-MCNC: 167 MG/DL (ref 200–360)
VIT B12 BLD-MCNC: 935 PG/ML (ref 211–946)
WBC NRBC COR # BLD: 5 10*3/MM3 (ref 3.4–10.8)

## 2022-12-02 PROCEDURE — 86901 BLOOD TYPING SEROLOGIC RH(D): CPT | Performed by: INTERNAL MEDICINE

## 2022-12-02 PROCEDURE — 82746 ASSAY OF FOLIC ACID SERUM: CPT | Performed by: INTERNAL MEDICINE

## 2022-12-02 PROCEDURE — G0378 HOSPITAL OBSERVATION PER HR: HCPCS

## 2022-12-02 PROCEDURE — 84100 ASSAY OF PHOSPHORUS: CPT | Performed by: INTERNAL MEDICINE

## 2022-12-02 PROCEDURE — 82247 BILIRUBIN TOTAL: CPT | Performed by: INTERNAL MEDICINE

## 2022-12-02 PROCEDURE — 82784 ASSAY IGA/IGD/IGG/IGM EACH: CPT | Performed by: INTERNAL MEDICINE

## 2022-12-02 PROCEDURE — 86900 BLOOD TYPING SEROLOGIC ABO: CPT | Performed by: INTERNAL MEDICINE

## 2022-12-02 PROCEDURE — 86900 BLOOD TYPING SEROLOGIC ABO: CPT

## 2022-12-02 PROCEDURE — 84466 ASSAY OF TRANSFERRIN: CPT | Performed by: INTERNAL MEDICINE

## 2022-12-02 PROCEDURE — 83540 ASSAY OF IRON: CPT | Performed by: INTERNAL MEDICINE

## 2022-12-02 PROCEDURE — 86850 RBC ANTIBODY SCREEN: CPT | Performed by: INTERNAL MEDICINE

## 2022-12-02 PROCEDURE — 82248 BILIRUBIN DIRECT: CPT | Performed by: INTERNAL MEDICINE

## 2022-12-02 PROCEDURE — 85045 AUTOMATED RETICULOCYTE COUNT: CPT | Performed by: INTERNAL MEDICINE

## 2022-12-02 PROCEDURE — 83010 ASSAY OF HAPTOGLOBIN QUANT: CPT | Performed by: INTERNAL MEDICINE

## 2022-12-02 PROCEDURE — 84165 PROTEIN E-PHORESIS SERUM: CPT | Performed by: INTERNAL MEDICINE

## 2022-12-02 PROCEDURE — 82728 ASSAY OF FERRITIN: CPT | Performed by: INTERNAL MEDICINE

## 2022-12-02 PROCEDURE — 83615 LACTATE (LD) (LDH) ENZYME: CPT | Performed by: INTERNAL MEDICINE

## 2022-12-02 PROCEDURE — 85025 COMPLETE CBC W/AUTO DIFF WBC: CPT | Performed by: INTERNAL MEDICINE

## 2022-12-02 PROCEDURE — 86901 BLOOD TYPING SEROLOGIC RH(D): CPT

## 2022-12-02 PROCEDURE — 80053 COMPREHEN METABOLIC PANEL: CPT | Performed by: INTERNAL MEDICINE

## 2022-12-02 PROCEDURE — 86334 IMMUNOFIX E-PHORESIS SERUM: CPT | Performed by: INTERNAL MEDICINE

## 2022-12-02 PROCEDURE — 82607 VITAMIN B-12: CPT | Performed by: INTERNAL MEDICINE

## 2022-12-02 RX ORDER — OSELTAMIVIR PHOSPHATE 6 MG/ML
30 FOR SUSPENSION ORAL EVERY 12 HOURS SCHEDULED
Qty: 30 ML | Refills: 0 | Status: SHIPPED | OUTPATIENT
Start: 2022-12-02 | End: 2022-12-04 | Stop reason: HOSPADM

## 2022-12-02 RX ORDER — LISINOPRIL 20 MG/1
20 TABLET ORAL EVERY 12 HOURS SCHEDULED
Status: DISCONTINUED | OUTPATIENT
Start: 2022-12-02 | End: 2022-12-04 | Stop reason: HOSPADM

## 2022-12-02 RX ORDER — LISINOPRIL 20 MG/1
20 TABLET ORAL DAILY
Start: 2022-12-02 | End: 2022-12-03 | Stop reason: SDUPTHER

## 2022-12-02 RX ORDER — BENZONATATE 200 MG/1
200 CAPSULE ORAL 3 TIMES DAILY PRN
Qty: 30 CAPSULE | Refills: 0 | Status: SHIPPED | OUTPATIENT
Start: 2022-12-02

## 2022-12-02 RX ORDER — OSELTAMIVIR PHOSPHATE 75 MG/1
75 CAPSULE ORAL EVERY 12 HOURS SCHEDULED
Status: DISCONTINUED | OUTPATIENT
Start: 2022-12-02 | End: 2022-12-04 | Stop reason: HOSPADM

## 2022-12-02 RX ORDER — SODIUM BICARBONATE 650 MG/1
1300 TABLET ORAL 2 TIMES DAILY
Status: DISCONTINUED | OUTPATIENT
Start: 2022-12-02 | End: 2022-12-04 | Stop reason: HOSPADM

## 2022-12-02 RX ORDER — SODIUM BICARBONATE 650 MG/1
1300 TABLET ORAL 2 TIMES DAILY
Qty: 12 TABLET | Refills: 0 | Status: SHIPPED | OUTPATIENT
Start: 2022-12-02 | End: 2022-12-04 | Stop reason: HOSPADM

## 2022-12-02 RX ORDER — DOXYCYCLINE 100 MG/1
100 TABLET ORAL EVERY 12 HOURS SCHEDULED
Qty: 5 TABLET | Refills: 0 | Status: SHIPPED | OUTPATIENT
Start: 2022-12-02 | End: 2022-12-05

## 2022-12-02 RX ADMIN — OSELTAMIVIR PHOSPHATE 30 MG: 6 FOR SUSPENSION ORAL at 08:58

## 2022-12-02 RX ADMIN — HYDROCODONE BITARTRATE AND HOMATROPINE METHYLBROMIDE 5 ML: 1.5; 5 SYRUP ORAL at 05:03

## 2022-12-02 RX ADMIN — Medication 10 ML: at 08:58

## 2022-12-02 RX ADMIN — HYDROCODONE BITARTRATE AND HOMATROPINE METHYLBROMIDE 5 ML: 1.5; 5 SYRUP ORAL at 08:58

## 2022-12-02 RX ADMIN — BUSPIRONE HYDROCHLORIDE 15 MG: 15 TABLET ORAL at 17:30

## 2022-12-02 RX ADMIN — BUSPIRONE HYDROCHLORIDE 15 MG: 15 TABLET ORAL at 23:07

## 2022-12-02 RX ADMIN — LISINOPRIL 20 MG: 20 TABLET ORAL at 14:56

## 2022-12-02 RX ADMIN — BENZONATATE 200 MG: 100 CAPSULE ORAL at 12:24

## 2022-12-02 RX ADMIN — LISINOPRIL 20 MG: 20 TABLET ORAL at 23:17

## 2022-12-02 RX ADMIN — ATORVASTATIN CALCIUM 40 MG: 40 TABLET, FILM COATED ORAL at 23:06

## 2022-12-02 RX ADMIN — POTASSIUM PHOSPHATE, MONOBASIC 500 MG: 500 TABLET, SOLUBLE ORAL at 17:30

## 2022-12-02 RX ADMIN — Medication 5 MG: at 23:07

## 2022-12-02 RX ADMIN — SODIUM BICARBONATE 650 MG TABLET 1300 MG: at 23:17

## 2022-12-02 RX ADMIN — TOPIRAMATE 50 MG: 25 TABLET, FILM COATED ORAL at 08:58

## 2022-12-02 RX ADMIN — TOPIRAMATE 50 MG: 25 TABLET, FILM COATED ORAL at 23:06

## 2022-12-02 RX ADMIN — BUSPIRONE HYDROCHLORIDE 15 MG: 15 TABLET ORAL at 08:58

## 2022-12-02 RX ADMIN — DOXYCYCLINE 100 MG: 100 TABLET ORAL at 08:58

## 2022-12-02 RX ADMIN — BENZONATATE 200 MG: 100 CAPSULE ORAL at 02:27

## 2022-12-02 RX ADMIN — POTASSIUM PHOSPHATE, MONOBASIC 500 MG: 500 TABLET, SOLUBLE ORAL at 23:06

## 2022-12-02 RX ADMIN — ACETAMINOPHEN 650 MG: 325 TABLET, FILM COATED ORAL at 17:30

## 2022-12-02 RX ADMIN — HYDROCODONE BITARTRATE AND HOMATROPINE METHYLBROMIDE 5 ML: 1.5; 5 SYRUP ORAL at 17:30

## 2022-12-02 RX ADMIN — Medication 10 ML: at 23:18

## 2022-12-02 RX ADMIN — DOXYCYCLINE 100 MG: 100 TABLET ORAL at 23:06

## 2022-12-02 RX ADMIN — OSELTAMIVIR PHOSPHATE 75 MG: 75 CAPSULE ORAL at 23:07

## 2022-12-02 RX ADMIN — ESCITALOPRAM OXALATE 20 MG: 10 TABLET ORAL at 08:58

## 2022-12-02 RX ADMIN — METOPROLOL TARTRATE 75 MG: 50 TABLET, FILM COATED ORAL at 08:58

## 2022-12-02 RX ADMIN — SODIUM BICARBONATE 650 MG TABLET 1300 MG: at 13:37

## 2022-12-02 NOTE — PLAN OF CARE
Problem: Adult Inpatient Plan of Care  Goal: Plan of Care Review  Outcome: Ongoing, Progressing  Goal: Patient-Specific Goal (Individualized)  Outcome: Ongoing, Progressing  Goal: Absence of Hospital-Acquired Illness or Injury  Outcome: Ongoing, Progressing  Intervention: Identify and Manage Fall Risk  Recent Flowsheet Documentation  Taken 12/2/2022 0000 by Carlos A Loza RNA  Safety Promotion/Fall Prevention: safety round/check completed  Taken 12/1/2022 2200 by Carlos A Loza RNA  Safety Promotion/Fall Prevention: safety round/check completed  Taken 12/1/2022 1900 by Carlos A Loza RNA  Safety Promotion/Fall Prevention:  • safety round/check completed  • assistive device/personal items within reach  • lighting adjusted  Intervention: Prevent and Manage VTE (Venous Thromboembolism) Risk  Recent Flowsheet Documentation  Taken 12/1/2022 1900 by Carlos A Loza RNA  Activity Management:  • activity adjusted per tolerance  • bedrest  VTE Prevention/Management: sequential compression devices off  Range of Motion: active ROM (range of motion) encouraged  Intervention: Prevent Infection  Recent Flowsheet Documentation  Taken 12/1/2022 1900 by Carlos A Loza RNA  Infection Prevention:  • environmental surveillance performed  • hand hygiene promoted  • personal protective equipment utilized  • rest/sleep promoted  • single patient room provided  Goal: Optimal Comfort and Wellbeing  Outcome: Ongoing, Progressing  Intervention: Monitor Pain and Promote Comfort  Recent Flowsheet Documentation  Taken 12/2/2022 0000 by Carlos A Loza RNA  Pain Management Interventions: see MAR  Taken 12/1/2022 1900 by Carlos A Loza RNA  Pain Management Interventions: see MAR  Intervention: Provide Person-Centered Care  Recent Flowsheet Documentation  Taken 12/1/2022 1900 by Carlos A Loza RNA  Trust Relationship/Rapport:  • care explained  • emotional support provided  • empathic listening provided  • questions answered  •  reassurance provided  Goal: Readiness for Transition of Care  Outcome: Ongoing, Progressing   Goal Outcome Evaluation:      Pleasant 62 years old female, with ongoing care plan. Dx IRENE, reports vomits no visualized from nursing staff, on assessment looks agitated and request medication for nausea, given Zofran IV with rapid improvement upon reevaluation. Education provided. Poor nutrition intake due vomits and nausea. Cooperative and complaint with current care, coughing still frequent now more productive after fluid replacement.

## 2022-12-02 NOTE — PROGRESS NOTES
Exercise Oximetry    Patient Name:Andressa Marks   MRN: [id number]   Date: 12/02/22             ROOM AIR BASELINE   SpO2% 97   Heart Rate 54   Blood Pressure      EXERCISE ON ROOM AIR SpO2% EXERCISE ON O2 @ LPM SpO2%   1 MINUTE 97 1 MINUTE    2 MINUTES 96 2 MINUTES    3 MINUTES 96 3 MINUTES    4 MINUTES 97 4 MINUTES    5 MINUTES 97 5 MINUTES    6 MINUTES 96 6 MINUTES               Distance Walked   Distance Walked   Dyspnea (Bee Scale)   Dyspnea (Bee Scale)   Fatigue (Bee Scale)   Fatigue (Bee Scale)   SpO2% Post Exercise   SpO2% Post Exercise   HR Post Exercise   HR Post Exercise   Time to Recovery   Time to Recovery     Comments: Pt doesn't indicate the need for home oxygen use.

## 2022-12-02 NOTE — PROGRESS NOTES
[facility] Progress Note      Patient Name: Andressa Marks  : 1960  MRN: [id number]  Primary Care Physician:  Fadia Beltran  Date of admission: 2022    Subjective     Interval History:     Overnight no event    Patient continues to have cough  Improved odynophagia  Able to tolerate oral intake better  Denies any chest pain or palpitations  No abdominal pain  Urinary spontaneously    No fevers or chills    Review of Systems:   As noted above    Objective     Vitals:   Temp:  [98 °F (36.7 °C)-99.2 °F (37.3 °C)] 98.4 °F (36.9 °C)  Heart Rate:  [56-72] 56  Resp:  [16-20] 16  BP: (136-169)/(65-76) 146/72    Intake/Output Summary (Last 24 hours) at 2022 1420  Last data filed at 2022 0900  Gross per 24 hour   Intake 240 ml   Output --   Net 240 ml       Physical Exam:    General Appearance: alert, oriented x 3, no acute distress   Skin: warm and dry  HEENT: oral mucosa normal, nonicteric sclera  Neck: supple, no JVD  Lungs: Fine crackles bibasal with expiratory wheezes  Heart: RRR, normal S1 and S2  Abdomen: soft, nontender, nondistended  : no palpable bladder  Extremities: no edema, cyanosis or clubbing  Neuro: normal speech and mental status     Scheduled Meds:     atorvastatin, 40 mg, Oral, Nightly  busPIRone, 15 mg, Oral, TID  doxycycline, 100 mg, Oral, Q12H  escitalopram, 20 mg, Oral, Daily  guaiFENesin, 1,200 mg, Oral, Q12H  lisinopril, 20 mg, Oral, Q12H  metoprolol tartrate, 25 mg, Oral, BID  oseltamivir, 30 mg, Oral, Q12H  potassium phosphate (monobasic), 500 mg, Oral, 4x Daily With Meals & Nightly  sodium bicarbonate, 1,300 mg, Oral, BID  sodium chloride, 10 mL, Intravenous, Q12H  topiramate, 50 mg, Oral, BID      IV Meds:   sodium chloride, 100 mL/hr, Last Rate: 100 mL/hr (22 1004)        Results Reviewed:   I have personally reviewed the results from the time of this admission to 2022 14:20 EST     Results from last 7 days   Lab Units  12/02/22  0026 12/01/22  1517 11/30/22  2352   SODIUM mmol/L 144 143 144   POTASSIUM mmol/L 4.0 4.3 4.3   CHLORIDE mmol/L 115* 114* 113*   CO2 mmol/L 18.0* 22.0 21.0*   BUN mg/dL 14 15 21   CREATININE mg/dL 0.85 0.96 1.15*   CALCIUM mg/dL 8.3* 8.4* 8.3*   BILIRUBIN mg/dL 0.2 0.3 0.3   ALK PHOS U/L 76 70 71   ALT (SGPT) U/L 11 9 11   AST (SGOT) U/L 24 17 17   GLUCOSE mg/dL 100* 103* 114*       Estimated Creatinine Clearance: 66.7 mL/min (by C-G formula based on SCr of 0.85 mg/dL).    Results from last 7 days   Lab Units 12/02/22  0026 11/30/22  0818   PHOSPHORUS mg/dL 2.2* 3.0             Results from last 7 days   Lab Units 12/02/22  0026 12/01/22  1517 11/30/22  2352 11/30/22  0915 11/29/22  0916   WBC 10*3/mm3 5.00 4.30 4.80 5.50 6.20   HEMOGLOBIN g/dL 9.1* 8.8* 9.1* 10.2* 11.2*   PLATELETS 10*3/mm3 155 151 160 166 178       Results from last 7 days   Lab Units 11/28/22  2256   INR  1.02       Assessment / Plan     ASSESSMENT:    - Non oliguric Acute kidney injury Baseline creatinine was normal creatinine up to 1.7 from 1.0 on 11/6/2022.  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 Creatinine trending down with IV hydration  .A previous CT scan abdomen pelvis in 2021  kidneys unremarkable.  Urinalysis bland.  Creatinine trend 1.7 > 1.3 > 1.1  - Influenza A pneumonia.  On supplemental oxygen, inhalers, followed closely by respiratory therapy and primary team .currently on Tamiflu    PLAN:    -We will discontinue IV fluids renal function has normalized  -We will continue to follow closely  -Avoid nephrotoxins  - Will order renal panel   -Adjust medications to GFR       Thank you for involving us in the care of Andressa Marks.  Please feel free to call with any questions.    Marcial Shukla MD  12/02/22  14:20 EST    Nephrology  [facility][1]  [phone]    Please note that portions of this note were completed with a voice recognition program.

## 2022-12-02 NOTE — CASE MANAGEMENT/SOCIAL WORK
Continued Stay Note  [facility]     Patient Name: Andressa Marks  MRN: [id number]  Today's Date: 12/2/2022    Admit Date: 11/29/2022    Plan: Home   Discharge Plan     Row Name 12/02/22 1313       Plan    Plan Home    Plan Comments Spoke with patient at Bedside Declined any needs on Room air. anticipate Discharge today                  Rosalia Alfred RN

## 2022-12-02 NOTE — PROGRESS NOTES
[facility] Medicine Services Daily Progress Note    Patient Name: Andressa Marks  : 1960  MRN: [id number]  Primary Care Physician:  Fadia Beltran  Date of admission: 2022      Subjective      Chief Complaint:  Cough sore throat      Patient Reports      seen and examined  Low bicarb noted  Likely from IV fluids  Hemoglobin level better today.  Nair not feel bwell  She wants toevalute her anemia hewre - labsorderfd  Hold off on dc rtoday      Review of Systems   All other systems reviewed and are negative.           Objective      Vitals:   Temp:  [98 °F (36.7 °C)-99.2 °F (37.3 °C)] 98.4 °F (36.9 °C)  Heart Rate:  [56-72] 56  Resp:  [16-20] 16  BP: (136-169)/(65-76) 146/72    Physical Exam     GENERAL APPEARANCE: Well developed, well nourished, alert and cooperative, and appears to be in no acute distress.  HEAD: normocephalic.  EYES: PERRL, EOMI. vision intact grossly.  EARS: Intact hearing.  No gross abnormalities.  NOSE: No nasal discharge.  THROAT: Clear   NECK: Neck supple, non-tender without lymphadenopathy, masses or thyromegaly.  CARDIAC: Normal S1 and S2. No S3, S4 or murmurs. Rhythm is regular. There is no peripheral edema, cyanosis or pallor. Extremities are warm and well perfused. Capillary refill is less than 2 seconds. No carotid bruits.  LUNGS: Clear to auscultation and percussion without rales, rhonchi, wheezing or diminished breath sounds.  ABDOMEN: Positive bowel sounds. Soft, nondistended, nontender. No guarding or rebound. No masses.  MUSKULOSKELETAL: No deformity or swelling   BACK: No abnormalities noted     EXTREMITIES: No significant deformity or joint abnormality. No edema. Peripheral pulses intact. No varicosities.  LOWER EXTREMITY: No edema or swelling  NEUROLOGICAL: CN II-XII intact. Strength and sensation symmetric and intact throughout. Reflexes 2+ throughout. Cerebellar testing normal.  SKIN: Skin normal color, texture and turgor with no  lesions or eruptions.  PSYCHIATRIC: Alert cooperative not suicidal          Result Review    Result Review:  I have personally reviewed the results from the time of this admission to 12/2/2022 12:40 EST and agree with these findings:  []  Laboratory  []  Microbiology  []  Radiology  []  EKG/Telemetry   []  Cardiology/Vascular   []  Pathology  []  Old records  []  Other:  Most notable findings include:             Assessment & Plan      Brief Patient Summary:  Andressa Marks is a 62 y.o. female who         atorvastatin, 40 mg, Oral, Nightly  busPIRone, 15 mg, Oral, TID  doxycycline, 100 mg, Oral, Q12H  escitalopram, 20 mg, Oral, Daily  guaiFENesin, 1,200 mg, Oral, Q12H  metoprolol tartrate, 75 mg, Oral, BID  oseltamivir, 30 mg, Oral, Q12H  sodium chloride, 10 mL, Intravenous, Q12H  topiramate, 50 mg, Oral, BID       sodium chloride, 100 mL/hr, Last Rate: 100 mL/hr (12/01/22 1004)         Active Hospital Problems:  Active Hospital Problems    Diagnosis    • **IRENE (acute kidney injury) (HCC)    • Influenza A    • Anxiety    • Depressive disorder    • Hyperlipidemia    • Essential hypertension    • Migraine without aura and responsive to treatment      Plan:   History of Present Illness: Andressa Marks is a 62 y.o. female who presented to [facility] on 11/29/2022 complaining of cough and shortness of breath for the past 3 weeks.  Patient states that she was diagnosed with viral pneumonia and acute kidney injury when she was admitted to the hospital.  Patient states her cough is productive with brown sputum and had a temperature as high as 102 at home the last few days.  Patient states her cough got worse over the past 3 to 4 days.  Patient states that she is a never smoker.  Patient states because she has had a worsening sore throat the last few days that she has not wanted to eat or drink much.  Patient also reports some nausea and episode of vomiting yesterday.  Patient states she has had intermittent  chest discomfort for the last few weeks as well exertional symptoms along with this.  Patient denies any diarrhea or swelling in her legs bilaterally     In the ED, initial troponin negative, proBNP elevated 4000 which previously was 1000 about 1 month ago.  Serum creatinine 1.74 and BUN of 38 with previous serum creatinine baseline around 1.1.  Initial lactic normal at 0.7.  D-dimer elevated at 1.28.  Coags unremarkable.  CBC largely unremarkable for acute findings.  Influenza A positive.  Blood cultures x2 pending.  Chest x-ray shows no acute findings.  CT PE protocol shows no evidence of pulmonary embolism.  No evidence of thoracic aortic aneurysm or dissection.  No evidence of pneumonia, pleural or pericardial effusions.  EKG shows sinus rhythm 87 bpm, prolonged QT interval, no ST elevation. Last echo done on 1/21/2021 shows EF of 55% and.  LV diastolic dysfunction.  Mild aortic regurgitation.  Patient is afebrile, pulse in the 80s, on room air oxygen at 99% SPO2 and blood pressure 110s/50s.  Patient was given Toradol 1 L of LR in ED.  Patient was also given hycodan for cough.  Spoke with GUZMAN Ndiaye, and case was discussed and accepted patient behalf of Dr. SANDY Holman for admission to hospitalist team.       Assessment/plan    Shortness of breath and cough  -Likely secondary to influenza A bronchitis  -proBNP elevated 4000 which previously was 1000 about 1 month ago.    - Initial lactic normal at 0.7.   - D-dimer elevated at 1.28.    -CBC largely unremarkable for acute findings. Blood cultures x2 pending.      -Chest x-ray shows no acute findings.    -CT PE protocol shows no evidence of pulmonary embolism or acute finding.  - -Pro-Honorio-0.24-- p.o. doxycycline.  -Tessalon Perles and Mucinex ordered  -Continuous cardiac monitoring  -Heart healthy diet  -Tamiflu      IRENE  -Likely prerenal azotemia   -Serum creatinine 1.74 and BUN of 38 with previous serum creatinine baseline around 1.1.   -Continue 100 mL/h normal  saline  -Recheck BMP  -Resolved    Anemia  rdered  Sore throat  -rapid Strep  negative  -Chloraseptic spray ordered     Hyperlipidemia, chronic  -Check lipid panel  -Continue home statin     Essential hypertension, chronic, controlled  -Continue ryann metoprolol  Hold losartan and HCTZ for IRENE    Anxiety/depression, chronic  -Continue home BuSpar, Lexapro, Atarax, Topamax     History of migraines, chronic  -Continue home Imitrex as needed          DVT prophylaxis:  Mechanical DVT prophylaxis orders are present.    CODE STATUS:    Code Status (Patient has no pulse and is not breathing): CPR (Attempt to Resuscitate)  Medical Interventions (Patient has pulse or is breathing): Full Support      Disposition:  I expect patient to be discharged home.    This patient has been examined wearing appropriate Personal Protective Equipment and discussed with hospital infection control department. 12/02/22      Electronically signed by Xiang Sanabria MD, 12/02/22, 12:40 EST.  [facility]ist Team

## 2022-12-02 NOTE — DISCHARGE SUMMARY
[facility] Medicine Services  DISCHARGE SUMMARY    Patient Name: Andressa Marks  : 1960  MRN: [id number]    Date of Admission: 2022  Discharge Diagnosis: Influenza A acute kidney injury  Date of Discharge: 22  Primary Care Physician: Fadia Beltran      Presenting Problem:   Influenza A [J10.1]  IRENE (acute kidney injury) ([facility]) [N17.9]    Active and Resolved Hospital Problems:  Active Hospital Problems    Diagnosis POA   • **IRENE (acute kidney injury) (HCC) [N17.9] Yes   • Influenza A [J10.1] Yes   • Anxiety [F41.9] Yes   • Depressive disorder [F32.A] Yes   • Hyperlipidemia [E78.5] Yes   • Essential hypertension [I10] Yes   • Migraine without aura and responsive to treatment [G43.009] Yes      Resolved Hospital Problems   No resolved problems to display.         Hospital Course     Hospital Course:  Andressa Marks is a 62 y.o. female       History of Present Illness: Andressa Marks is a 62 y.o. female who presented to [facility] on 2022 complaining of cough and shortness of breath for the past 3 weeks.  Patient states that she was diagnosed with viral pneumonia and acute kidney injury when she was admitted to the hospital.  Patient states her cough is productive with brown sputum and had a temperature as high as 102 at home the last few days.  Patient states her cough got worse over the past 3 to 4 days.  Patient states that she is a never smoker.  Patient states because she has had a worsening sore throat the last few days that she has not wanted to eat or drink much.  Patient also reports some nausea and episode of vomiting yesterday.  Patient states she has had intermittent chest discomfort for the last few weeks as well exertional symptoms along with this.  Patient denies any diarrhea or swelling in her legs bilaterally     In the ED, initial troponin negative, proBNP elevated 4000 which previously was 1000 about 1 month ago.  Serum  creatinine 1.74 and BUN of 38 with previous serum creatinine baseline around 1.1.  Initial lactic normal at 0.7.  D-dimer elevated at 1.28.  Coags unremarkable.  CBC largely unremarkable for acute findings.  Influenza A positive.  Blood cultures x2 pending.  Chest x-ray shows no acute findings.  CT PE protocol shows no evidence of pulmonary embolism.  No evidence of thoracic aortic aneurysm or dissection.  No evidence of pneumonia, pleural or pericardial effusions.  EKG shows sinus rhythm 87 bpm, prolonged QT interval, no ST elevation. Last echo done on 1/21/2021 shows EF of 55% and.  LV diastolic dysfunction.  Mild aortic regurgitation.  Patient is afebrile, pulse in the 80s, on room air oxygen at 99% SPO2 and blood pressure 110s/50s.  Patient was given Toradol 1 L of LR in ED.  Patient was also given hycodan for cough.  Spoke with GUZMAN Ndiaye, and case was discussed and accepted patient behalf of Dr. SANDY Holman for admission to hospitalist team.        Assessment/plan     Shortness of breath and cough  -Likely secondary to influenza A bronchitis  -proBNP elevated 4000 which previously was 1000 about 1 month ago.    - Initial lactic normal at 0.7.   - D-dimer elevated at 1.28.    -CBC largely unremarkable for acute findings. Blood cultures x2 pending.      -Chest x-ray shows no acute findings.    -CT PE protocol shows no evidence of pulmonary embolism or acute finding.  - -Pro-Honorio-0.24-- p.o. doxycycline.  -Tessalon Perles and Mucinex ordered  -Continuous cardiac monitoring  -Heart healthy diet  -Tamiflu        IRENE  -Likely prerenal azotemia   -Serum creatinine 1.74 and BUN of 38 with previous serum creatinine baseline around 1.1.   -Continue 100 mL/h normal saline  -Recheck BMP  -Resolved  - Start small dose lisinopril  #Keep holding thiazide diuretics until follow-up with PCP    Bradycardia  Taken off of beta-blocker  Discussed with patient  Heart rate better  Monitor heart rate and blood pressure at  home    Sore throat  -rapid Strep  negative  -Chloraseptic spray ordered     Hyperlipidemia, chronic  -Check lipid panel  -Continue home statin     Essential hypertension, chronic, controlled  -Continue ryann metoprolol  Restart home dose lisinopril    Metabolic acidosis resolved-received for bicarb  Hypophosphatemia resolved  Received replacement    Diarrhea resolved  Possibly viral    Now has some delay in her bowel movements  Denies vomiting  .      Anxiety/depression, chronic  -Continue home BuSpar, Lexapro, Atarax, Topamax     History of migraines, chronic  -Continue home Imitrex as needed          DISCHARGE Follow Up Recommendations for labs and diagnostics:   Follow-up with PCP soon as possible      Reasons For Change In Medications and Indications for New Medications:      Day of Discharge     Vital Signs:  Temp:  [98 °F (36.7 °C)-99.2 °F (37.3 °C)] 98.4 °F (36.9 °C)  Heart Rate:  [56-72] 56  Resp:  [16-20] 16  BP: (136-169)/(65-76) 146/72    Physical Exam:  Physical Exam   Improved air entry lung exam  No abdominal tenderness or pain      Pertinent  and/or Most Recent Results     LAB RESULTS:      Lab 12/02/22  0026 12/01/22  1517 11/30/22  2352 11/30/22  0915 11/29/22  0916 11/29/22  0003 11/28/22  2256   WBC 5.00 4.30 4.80 5.50 6.20  --  6.80   HEMOGLOBIN 9.1* 8.8* 9.1* 10.2* 11.2*  --  12.1   HEMATOCRIT 29.0* 26.7* 28.0* 31.0* 33.2*  --  36.2   PLATELETS 155 151 160 166 178  --  237   NEUTROS ABS 2.30 1.90 2.40 3.20 3.60  --  4.80   LYMPHS ABS 1.80 1.70 1.60 1.50 1.70  --  1.30   MONOS ABS 0.60 0.50 0.70 0.80 0.80  --  0.70   EOS ABS 0.20 0.20 0.20 0.10 0.00  --  0.00   MCV 91.7 85.6 87.5 88.2 86.0  --  85.6   PROCALCITONIN  --   --   --   --  0.24  --   --    LACTATE  --   --   --   --   --  0.7  --    PROTIME  --   --   --   --   --   --  10.5   APTT  --   --   --   --   --   --  30.1         Lab 12/02/22  0026 12/01/22  1517 11/30/22  2352 11/30/22  0818 11/29/22  0916   SODIUM 144 143 144 140 140    POTASSIUM 4.0 4.3 4.3 4.3 4.6   CHLORIDE 115* 114* 113* 109* 107   CO2 18.0* 22.0 21.0* 21.0* 21.0*   ANION GAP 11.0 7.0 10.0 10.0 12.0   BUN 14 15 21 24* 36*   CREATININE 0.85 0.96 1.15* 1.02* 1.34*   EGFR 76.6 66.2 53.3* 61.6 44.4*   GLUCOSE 100* 103* 114* 106* 105*   CALCIUM 8.3* 8.4* 8.3* 8.6 9.2   PHOSPHORUS 2.2*  --   --  3.0  --          Lab 12/02/22  0026 12/01/22  1517 11/30/22  2352 11/30/22  0818 11/28/22  2256   TOTAL PROTEIN 6.0 6.1 6.1 6.6 8.1   ALBUMIN 3.00* 3.40* 3.30* 3.60 4.50   GLOBULIN 3.0 2.7 2.8 3.0 3.6   ALT (SGPT) 11 9 11 9 16   AST (SGOT) 24 17 17 18 26   BILIRUBIN 0.2 0.3 0.3 0.3 0.3   ALK PHOS 76 70 71 77 109         Lab 11/28/22  2256   PROBNP 4,059.0*   TROPONIN T <0.010   PROTIME 10.5   INR 1.02         Lab 11/29/22  0916   CHOLESTEROL 144   LDL CHOL 79   HDL CHOL 38*   TRIGLYCERIDES 157*             Brief Urine Lab Results  (Last result in the past 365 days)      Color   Clarity   Blood   Leuk Est   Nitrite   Protein   CREAT   Urine HCG        11/29/22 1811 Yellow   Clear   Negative   Moderate (2+)   Negative   30 mg/dL (1+)               Microbiology Results (last 10 days)     Procedure Component Value - Date/Time    Rapid Strep A Screen - Swab, Throat [[id number]]  (Normal) Collected: 11/29/22 0429    Lab Status: Final result Specimen: Swab from Throat Updated: 11/29/22 0449     Strep A Ag Negative    Influenza A & B, ALIDA - Swab, Nasopharynx [[id number]]  (Abnormal) Collected: 11/29/22 0236    Lab Status: Final result Specimen: Swab from Nasopharynx Updated: 11/29/22 0259     Influenza A PCR Detected     Influenza B PCR Not Detected    Blood Culture - Blood, Arm, Right [[id number]]  (Normal) Collected: 11/28/22 2357    Lab Status: Preliminary result Specimen: Blood from Arm, Right Updated: 12/02/22 0015     Blood Culture No growth at 3 days    Blood Culture - Blood, Arm, Left [[id number]]  (Normal) Collected: 11/28/22 2357    Lab Status: Preliminary result Specimen: Blood from Arm,  Left Updated: 12/02/22 0015     Blood Culture No growth at 3 days          XR Chest 2 View    Result Date: 11/29/2022  Impression: No acute cardiopulmonary process seen. Electronically signed by:  Amy Cancino M.D.  11/28/2022 11:35 PM Mountain Time    US Head Neck Soft Tissue    Result Date: 11/7/2022  Impression: There is a single mildly prominent indeterminate right level 2 node at the site of patient's area of concern. Consider short-term surveillance ultrasound to document stability or improvement. The other sites of the palpable complaint in the right neck and at the level of the right clavicle are within normal limits.  Electronically Signed By-Pauline Callahan MD On:11/7/2022 1:10 PM This report was finalized on [id number] by  Pauline Callahan MD.    CT Angiogram Chest Pulmonary Embolism    Result Date: 11/29/2022  Impression: 1. No evidence of pulmonary embolism. 2. No evidence of thoracic aortic aneurysm or dissection. 3. No evidence of pneumonia, pleural or pericardial effusions. Electronically signed by:  Samir Ren D.O.  11/28/2022 11:54 PM Mountain Time    CT Angiogram Chest Pulmonary Embolism    Result Date: 11/3/2022  Impression:  1. No pulmonary embolism. 2. Features of developing interstitial and alveolar edema with small bilateral pleural effusions, new since 11/1/2022. 3. New mild bilateral lower lobe and right middle lobe atelectasis.    Electronically Signed By-Paulnie Callahan MD On:11/3/2022 1:10 PM This report was finalized on [id number] by  Pauline Callahan MD.    XR Chest PA & Lateral    Result Date: 11/4/2022  Impression: No radiographic findings of pulmonary edema. No acute process demonstrated  Electronically Signed By-Ross Wyatt On:11/4/2022 2:56 PM This report was finalized on [id number] by  Ross Wyatt, .                  Labs Pending at Discharge:  Pending Labs     Order Current Status    Blood Culture - Blood, Arm, Left Preliminary result    Blood Culture -  Blood, Arm, Right Preliminary result          Procedures Performed           Consults:   Consults     Date and Time Order Name Status Description    11/29/2022  3:17 AM Hospitalist (on-call MD unless specified)      11/3/2022 11:29 AM Inpatient Pulmonology Consult Completed     11/1/2022  2:58 PM Inpatient Nephrology Consult Completed             Discharge Details        Discharge Medications      New Medications      Instructions Start Date   benzocaine-menthol 15-3.6 MG lozenge lozenge  Commonly known as: CEPACOL   1 each, Mouth/Throat, Every 2 Hours PRN      benzonatate 200 MG capsule  Commonly known as: TESSALON   200 mg, Oral, 3 Times Daily PRN      doxycycline 100 MG tablet  Commonly known as: ADOXA   100 mg, Oral, Every 12 Hours Scheduled      oseltamivir 6 MG/ML suspension  Commonly known as: TAMIFLU   30 mg, Oral, Every 12 Hours Scheduled      sodium bicarbonate 650 MG tablet   1,300 mg, Oral, 2 Times Daily         Changes to Medications      Instructions Start Date   lisinopril 20 MG tablet  Commonly known as: PRINIVIL,ZESTRIL  What changed: when to take this   20 mg, Oral, Daily         Continue These Medications      Instructions Start Date   atorvastatin 40 MG tablet  Commonly known as: LIPITOR   40 mg, Oral, Daily      busPIRone 15 MG tablet  Commonly known as: BUSPAR   15 mg, Oral, 3 Times Daily      escitalopram 20 MG tablet  Commonly known as: LEXAPRO   20 mg, Oral, Daily      fluticasone 50 MCG/ACT nasal spray  Commonly known as: FLONASE   1 spray, Nasal, Daily      hydrOXYzine 25 MG tablet  Commonly known as: ATARAX   25 mg, Oral, 3 Times Daily PRN      ipratropium-albuterol 0.5-2.5 mg/3 ml nebulizer  Commonly known as: DUO-NEB   3 mL, Nebulization, Every 4 Hours PRN      Metoprolol Tartrate 75 MG tablet   Take 75 mg (1 tablet) by mouth 2 (Two) Times a Day for 30 days.      multivitamin with minerals tablet tablet   1 tablet, Oral, Daily      SUMAtriptan 50 MG tablet  Commonly known as: IMITREX    50 mg, Oral, Every 2 Hours PRN, Take one tablet at onset of headache. May repeat dose one time in 2 hours if headache not relieved.      topiramate 50 MG tablet  Commonly known as: TOPAMAX   50 mg, Oral, 2 Times Daily      traZODone 50 MG tablet  Commonly known as: DESYREL   50 mg, Oral, Nightly PRN         Stop These Medications    hydroCHLOROthiazide 25 MG tablet  Commonly known as: HYDRODIURIL            Allergies   Allergen Reactions   • Penicillins Shortness Of Breath         Discharge Disposition:     Home or Self Care    Diet:  Hospital:  Diet Order   Procedures   • Diet: Cardiac Diets; Healthy Heart (2-3 Na+); Texture: Regular Texture (IDDSI 7); Fluid Consistency: Thin (IDDSI 0)         Discharge Activity:         CODE STATUS:  Code Status and Medical Interventions:   Ordered at: 11/29/22 0351     Code Status (Patient has no pulse and is not breathing):    CPR (Attempt to Resuscitate)     Medical Interventions (Patient has pulse or is breathing):    Full Support         No future appointments.        Time spent on Discharge including face to face service:  25 minutes    This patient has been examined wearing appropriate Personal Protective Equipment and discussed with hospital infection control department.   12/4/2022    Signature:Electronically signed by Xiang Sanabria MD, 12/04/22, 2:18 PM EST.  [facility] Hospitalist Team

## 2022-12-03 LAB
ANION GAP SERPL CALCULATED.3IONS-SCNC: 9 MMOL/L (ref 5–15)
BASOPHILS # BLD AUTO: 0 10*3/MM3 (ref 0–0.2)
BASOPHILS NFR BLD AUTO: 0.3 % (ref 0–1.5)
BUN SERPL-MCNC: 15 MG/DL (ref 8–23)
BUN/CREAT SERPL: 12.4 (ref 7–25)
CALCIUM SPEC-SCNC: 8.5 MG/DL (ref 8.6–10.5)
CHLORIDE SERPL-SCNC: 112 MMOL/L (ref 98–107)
CO2 SERPL-SCNC: 24 MMOL/L (ref 22–29)
CREAT SERPL-MCNC: 1.21 MG/DL (ref 0.57–1)
DEPRECATED RDW RBC AUTO: 44.6 FL (ref 37–54)
EGFRCR SERPLBLD CKD-EPI 2021: 50.2 ML/MIN/1.73
EOSINOPHIL # BLD AUTO: 0.2 10*3/MM3 (ref 0–0.4)
EOSINOPHIL NFR BLD AUTO: 3.8 % (ref 0.3–6.2)
ERYTHROCYTE [DISTWIDTH] IN BLOOD BY AUTOMATED COUNT: 14.4 % (ref 12.3–15.4)
GLUCOSE SERPL-MCNC: 107 MG/DL (ref 65–99)
HCT VFR BLD AUTO: 26.5 % (ref 34–46.6)
HGB BLD-MCNC: 9 G/DL (ref 12–15.9)
LYMPHOCYTES # BLD AUTO: 1.8 10*3/MM3 (ref 0.7–3.1)
LYMPHOCYTES NFR BLD AUTO: 29.3 % (ref 19.6–45.3)
MCH RBC QN AUTO: 28.8 PG (ref 26.6–33)
MCHC RBC AUTO-ENTMCNC: 33.8 G/DL (ref 31.5–35.7)
MCV RBC AUTO: 85.3 FL (ref 79–97)
MONOCYTES # BLD AUTO: 0.7 10*3/MM3 (ref 0.1–0.9)
MONOCYTES NFR BLD AUTO: 11.4 % (ref 5–12)
NEUTROPHILS NFR BLD AUTO: 3.4 10*3/MM3 (ref 1.7–7)
NEUTROPHILS NFR BLD AUTO: 55.2 % (ref 42.7–76)
NRBC BLD AUTO-RTO: 0 /100 WBC (ref 0–0.2)
PHOSPHATE SERPL-MCNC: 3 MG/DL (ref 2.5–4.5)
PLATELET # BLD AUTO: 210 10*3/MM3 (ref 140–450)
PMV BLD AUTO: 8.4 FL (ref 6–12)
POTASSIUM SERPL-SCNC: 4.5 MMOL/L (ref 3.5–5.2)
RBC # BLD AUTO: 3.11 10*6/MM3 (ref 3.77–5.28)
SODIUM SERPL-SCNC: 145 MMOL/L (ref 136–145)
WBC NRBC COR # BLD: 6.1 10*3/MM3 (ref 3.4–10.8)

## 2022-12-03 PROCEDURE — 85025 COMPLETE CBC W/AUTO DIFF WBC: CPT | Performed by: INTERNAL MEDICINE

## 2022-12-03 PROCEDURE — 99204 OFFICE O/P NEW MOD 45 MIN: CPT | Performed by: INTERNAL MEDICINE

## 2022-12-03 PROCEDURE — G0378 HOSPITAL OBSERVATION PER HR: HCPCS

## 2022-12-03 PROCEDURE — 80048 BASIC METABOLIC PNL TOTAL CA: CPT | Performed by: INTERNAL MEDICINE

## 2022-12-03 PROCEDURE — 84100 ASSAY OF PHOSPHORUS: CPT | Performed by: INTERNAL MEDICINE

## 2022-12-03 RX ADMIN — ACETAMINOPHEN 650 MG: 325 TABLET, FILM COATED ORAL at 09:31

## 2022-12-03 RX ADMIN — BENZONATATE 200 MG: 100 CAPSULE ORAL at 17:33

## 2022-12-03 RX ADMIN — LISINOPRIL 20 MG: 20 TABLET ORAL at 09:31

## 2022-12-03 RX ADMIN — Medication 10 ML: at 09:32

## 2022-12-03 RX ADMIN — DOXYCYCLINE 100 MG: 100 TABLET ORAL at 09:31

## 2022-12-03 RX ADMIN — BUSPIRONE HYDROCHLORIDE 15 MG: 15 TABLET ORAL at 22:46

## 2022-12-03 RX ADMIN — HYDROCODONE BITARTRATE AND HOMATROPINE METHYLBROMIDE 5 ML: 1.5; 5 SYRUP ORAL at 09:31

## 2022-12-03 RX ADMIN — ATORVASTATIN CALCIUM 40 MG: 40 TABLET, FILM COATED ORAL at 22:45

## 2022-12-03 RX ADMIN — SODIUM BICARBONATE 650 MG TABLET 1300 MG: at 22:56

## 2022-12-03 RX ADMIN — LISINOPRIL 20 MG: 20 TABLET ORAL at 22:45

## 2022-12-03 RX ADMIN — OSELTAMIVIR PHOSPHATE 75 MG: 75 CAPSULE ORAL at 22:46

## 2022-12-03 RX ADMIN — POTASSIUM PHOSPHATE, MONOBASIC 500 MG: 500 TABLET, SOLUBLE ORAL at 17:33

## 2022-12-03 RX ADMIN — BUSPIRONE HYDROCHLORIDE 15 MG: 15 TABLET ORAL at 09:31

## 2022-12-03 RX ADMIN — POTASSIUM PHOSPHATE, MONOBASIC 500 MG: 500 TABLET, SOLUBLE ORAL at 22:46

## 2022-12-03 RX ADMIN — METOPROLOL TARTRATE 25 MG: 25 TABLET, FILM COATED ORAL at 09:32

## 2022-12-03 RX ADMIN — OSELTAMIVIR PHOSPHATE 75 MG: 75 CAPSULE ORAL at 09:31

## 2022-12-03 RX ADMIN — TOPIRAMATE 50 MG: 25 TABLET, FILM COATED ORAL at 09:31

## 2022-12-03 RX ADMIN — SODIUM BICARBONATE 650 MG TABLET 1300 MG: at 09:31

## 2022-12-03 RX ADMIN — Medication 5 MG: at 22:56

## 2022-12-03 RX ADMIN — HYDROCODONE BITARTRATE AND HOMATROPINE METHYLBROMIDE 5 ML: 1.5; 5 SYRUP ORAL at 22:56

## 2022-12-03 RX ADMIN — BUSPIRONE HYDROCHLORIDE 15 MG: 15 TABLET ORAL at 17:33

## 2022-12-03 RX ADMIN — POTASSIUM PHOSPHATE, MONOBASIC 500 MG: 500 TABLET, SOLUBLE ORAL at 12:17

## 2022-12-03 RX ADMIN — Medication 10 ML: at 22:48

## 2022-12-03 RX ADMIN — TOPIRAMATE 50 MG: 25 TABLET, FILM COATED ORAL at 22:46

## 2022-12-03 RX ADMIN — DOXYCYCLINE 100 MG: 100 TABLET ORAL at 22:46

## 2022-12-03 RX ADMIN — ESCITALOPRAM OXALATE 20 MG: 10 TABLET ORAL at 09:32

## 2022-12-03 RX ADMIN — POTASSIUM PHOSPHATE, MONOBASIC 500 MG: 500 TABLET, SOLUBLE ORAL at 09:31

## 2022-12-03 NOTE — PROGRESS NOTES
[facility] Progress Note      Patient Name: Andressa Marks  : 1960  MRN: [id number]  Primary Care Physician:  Fadia Beltran  Date of admission: 2022    Subjective     Interval History:     Overnight no event  No issues noted today.  Continues to be weak and fatigued.  No labs today  No fevers or chills    Review of Systems:   As noted above    Objective     Vitals:   Temp:  [98.2 °F (36.8 °C)-99 °F (37.2 °C)] 98.4 °F (36.9 °C)  Heart Rate:  [47-65] 65  Resp:  [14-18] 18  BP: (121-142)/(68-72) 137/68    Intake/Output Summary (Last 24 hours) at 12/3/2022 1301  Last data filed at 12/3/2022 0903  Gross per 24 hour   Intake 480 ml   Output --   Net 480 ml       Physical Exam:    General Appearance: alert, oriented x 3, no acute distress   Skin: warm and dry  HEENT: oral mucosa normal, nonicteric sclera  Neck: supple, no JVD  Lungs: Fine crackles bibasal with expiratory wheezes  Heart: RRR, normal S1 and S2  Abdomen: soft, nontender, nondistended  : no palpable bladder  Extremities: no edema, cyanosis or clubbing  Neuro: normal speech and mental status     Scheduled Meds:     atorvastatin, 40 mg, Oral, Nightly  busPIRone, 15 mg, Oral, TID  doxycycline, 100 mg, Oral, Q12H  escitalopram, 20 mg, Oral, Daily  guaiFENesin, 1,200 mg, Oral, Q12H  lisinopril, 20 mg, Oral, Q12H  metoprolol tartrate, 25 mg, Oral, BID  oseltamivir, 75 mg, Oral, Q12H  potassium phosphate (monobasic), 500 mg, Oral, 4x Daily With Meals & Nightly  sodium bicarbonate, 1,300 mg, Oral, BID  sodium chloride, 10 mL, Intravenous, Q12H  topiramate, 50 mg, Oral, BID      IV Meds:        Results Reviewed:   I have personally reviewed the results from the time of this admission to 12/3/2022 13:01 EST     Results from last 7 days   Lab Units 22  1518 22  0026 22  1517 22  2692   SODIUM mmol/L  --  144 143 144   POTASSIUM mmol/L  --  4.0 4.3 4.3   CHLORIDE mmol/L  --  115* 114* 113*   CO2  mmol/L  --  18.0* 22.0 21.0*   BUN mg/dL  --  14 15 21   CREATININE mg/dL  --  0.85 0.96 1.15*   CALCIUM mg/dL  --  8.3* 8.4* 8.3*   BILIRUBIN mg/dL 0.3 0.2 0.3 0.3   ALK PHOS U/L  --  76 70 71   ALT (SGPT) U/L  --  11 9 11   AST (SGOT) U/L  --  24 17 17   GLUCOSE mg/dL  --  100* 103* 114*       Estimated Creatinine Clearance: 66.7 mL/min (by C-G formula based on SCr of 0.85 mg/dL).    Results from last 7 days   Lab Units 12/02/22  0026 11/30/22  0818   PHOSPHORUS mg/dL 2.2* 3.0             Results from last 7 days   Lab Units 12/02/22  0026 12/01/22  1517 11/30/22  2352 11/30/22  0915 11/29/22  0916   WBC 10*3/mm3 5.00 4.30 4.80 5.50 6.20   HEMOGLOBIN g/dL 9.1* 8.8* 9.1* 10.2* 11.2*   PLATELETS 10*3/mm3 155 151 160 166 178       Results from last 7 days   Lab Units 11/28/22  2256   INR  1.02       Assessment / Plan     ASSESSMENT:    - Non oliguric Acute kidney injury Baseline creatinine was normal creatinine up to 1.7 from 1.0 on 11/6/2022.  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 Creatinine trending down with IV hydration  .A previous CT scan abdomen pelvis in 2021  kidneys unremarkable.  Urinalysis bland.  Creatinine trend 1.7 > 1.3 > 1.1> 0.85 as of yesterday.  - Influenza A pneumonia.  On supplemental oxygen, inhalers, followed closely by respiratory therapy and primary team .currently on Tamiflu    PLAN:    We will continue current therapy for the time being.  No labs today we will recheck.  Continue surveillance labs from      Thank you for involving us in the care of Andressa Marks.  Please feel free to call with any questions.    La Heard MD  12/03/22  13:01 [facility]    [facility]  [phone]    Please note that portions of this note were completed with a voice recognition program.

## 2022-12-03 NOTE — CONSULTS
Hematology/Oncology Inpatient Consultation    Patient name: Andressa Marks  : 1960  MRN: [id number]  Referring Provider: Xiang Sanabria MD  Reason for Consultation: Unexplained anemia    Chief complaint: Cough    History of present illness:    Andressa Marks is a 62 y.o. female who presented to [facility] on 2022 with complaints of productive cough, fever and shortness of breath for the past 3 weeks with history of viral pneumonia diagnosis and acute kidney injury.  Patient was found to be influenza A+ and have IRENE.  She was admitted for further management    22  Hematology/Oncology was consulted for unexplained anemia with a drop in hemoglobin from 12-9.  Patient was found to have good iron stores at 49 and saturation at 20.  Hemolysis labs were negative.  SPEP is pending.  Oncology will await SPEP to rule out underlying condition, and occult stool to rule out GI bleed while transfusing as needed for Hgb<7.0.  If all labs are negative oncology may consider bone marrow biopsy.    She  has a past medical history of Atrial fibrillation (HCC), Atrial fibrillation (HCC), Depression, Hypertension, and Migraine.    PCP: Fadia Beltran    History:  Past Medical History:   Diagnosis Date   • Atrial fibrillation (HCC)    • Atrial fibrillation (HCC)    • Depression    • Hypertension    • Migraine    ,   Past Surgical History:   Procedure Laterality Date   • THORACOTOMY Left     for TB   ,   Family History   Problem Relation Age of Onset   • No Known Problems Mother    • No Known Problems Father    ,   Social History     Tobacco Use   • Smoking status: Never   • Smokeless tobacco: Never   Vaping Use   • Vaping Use: Never used   ,   Medications Prior to Admission   Medication Sig Dispense Refill Last Dose   • atorvastatin (LIPITOR) 40 MG tablet Take 1 tablet by mouth Daily.      • busPIRone (BUSPAR) 15 MG tablet Take 1 tablet by mouth 3 (Three) Times a Day.      •  escitalopram (LEXAPRO) 20 MG tablet Take 1 tablet by mouth Daily.      • fluticasone (FLONASE) 50 MCG/ACT nasal spray 1 spray into the nostril(s) as directed by provider Daily.      • hydroCHLOROthiazide (HYDRODIURIL) 25 MG tablet Take 1 tablet by mouth Daily.      • hydrOXYzine (ATARAX) 25 MG tablet Take 25 mg by mouth 3 (Three) Times a Day As Needed for Anxiety.      • ipratropium-albuterol (DUO-NEB) 0.5-2.5 mg/3 ml nebulizer Take 3 mL by nebulization Every 4 (Four) Hours As Needed for Wheezing or Shortness of Air for up to 15 days. 270 mL 0    • Metoprolol Tartrate 75 MG tablet Take 75 mg (1 tablet) by mouth 2 (Two) Times a Day for 30 days. 60 tablet 0    • multivitamin with minerals tablet tablet Take 1 tablet by mouth Daily.      • SUMAtriptan (IMITREX) 50 MG tablet Take 1 tablet by mouth Every 2 (Two) Hours As Needed for Migraine. Take one tablet at onset of headache. May repeat dose one time in 2 hours if headache not relieved.      • topiramate (TOPAMAX) 50 MG tablet Take 1 tablet by mouth 2 (Two) Times a Day.      • traZODone (DESYREL) 50 MG tablet Take 50 mg by mouth At Night As Needed for Sleep.      • [DISCONTINUED] lisinopril (PRINIVIL,ZESTRIL) 20 MG tablet Take 1 tablet by mouth 2 (Two) Times a Day.      , Scheduled Meds:  atorvastatin, 40 mg, Oral, Nightly  busPIRone, 15 mg, Oral, TID  doxycycline, 100 mg, Oral, Q12H  escitalopram, 20 mg, Oral, Daily  guaiFENesin, 1,200 mg, Oral, Q12H  lisinopril, 20 mg, Oral, Q12H  metoprolol tartrate, 25 mg, Oral, BID  oseltamivir, 75 mg, Oral, Q12H  potassium phosphate (monobasic), 500 mg, Oral, 4x Daily With Meals & Nightly  sodium bicarbonate, 1,300 mg, Oral, BID  sodium chloride, 10 mL, Intravenous, Q12H  topiramate, 50 mg, Oral, BID    , Continuous Infusions:   , PRN Meds:  •  acetaminophen **OR** acetaminophen **OR** acetaminophen  •  aluminum-magnesium hydroxide-simethicone  •  benzocaine-menthol  •  benzonatate  •  HYDROcodone Edwin MBr  •   "hydrOXYzine  •  ipratropium-albuterol  •  magnesium sulfate **OR** magnesium sulfate **OR** magnesium sulfate  •  melatonin  •  nitroglycerin  •  ondansetron **OR** ondansetron  •  phenol  •  potassium chloride **OR** potassium chloride **OR** potassium chloride  •  sodium chloride  •  sodium chloride  •  sodium chloride  •  SUMAtriptan  •  traZODone   Allergies:  Penicillins    Subjective     ROS:  Review of Systems   Constitutional: Positive for fatigue and unexpected weight change. Negative for fever.   HENT: Negative for congestion and nosebleeds.    Eyes: Negative for pain.   Respiratory: Positive for cough and shortness of breath.    Cardiovascular: Negative for chest pain.   Gastrointestinal: Negative for abdominal pain, blood in stool, diarrhea, nausea and vomiting.   Endocrine: Negative for cold intolerance and heat intolerance.   Genitourinary: Negative for difficulty urinating.   Musculoskeletal: Negative for arthralgias.   Skin: Negative for rash.   Neurological: Negative for dizziness and headaches.   Hematological: Does not bruise/bleed easily.   Psychiatric/Behavioral: Negative for behavioral problems.        Objective   Vital Signs:   /68 (BP Location: Right arm, Patient Position: Lying)   Pulse 65   Temp 98.4 °F (36.9 °C) (Oral)   Resp 18   Ht 162.6 cm (64\")   Wt 76 kg (167 lb 8.8 oz)   SpO2 96%   BMI 28.76 kg/m²     Physical Exam: (performed by MD)  Physical Exam  Vitals and nursing note reviewed.   HENT:      Head: Normocephalic.      Mouth/Throat:      Pharynx: Oropharynx is clear.   Eyes:      Pupils: Pupils are equal, round, and reactive to light.   Cardiovascular:      Rate and Rhythm: Normal rate and regular rhythm.      Pulses: Normal pulses.   Pulmonary:      Effort: Pulmonary effort is normal.   Abdominal:      General: Bowel sounds are normal.   Musculoskeletal:         General: Normal range of motion.      Cervical back: Normal range of motion.   Skin:     General: Skin is " warm.   Neurological:      Mental Status: She is alert and oriented to person, place, and time.   Psychiatric:         Behavior: Behavior normal.         Results Review:  Lab Results (last 48 hours)     Procedure Component Value Units Date/Time    Blood Culture - Blood, Arm, Right [[id number]]  (Normal) Collected: 11/28/22 2357    Specimen: Blood from Arm, Right Updated: 12/03/22 0015     Blood Culture No growth at 4 days    Blood Culture - Blood, Arm, Left [[id number]]  (Normal) Collected: 11/28/22 2357    Specimen: Blood from Arm, Left Updated: 12/03/22 0015     Blood Culture No growth at 4 days    Folate [[id number]]  (Normal) Collected: 12/02/22 1518    Specimen: Blood from Arm, Right Updated: 12/02/22 2018     Folate >20.00 ng/mL     Narrative:      Results may be falsely increased if patient taking Biotin.      Vitamin B12 [[id number]]  (Normal) Collected: 12/02/22 1518    Specimen: Blood from Arm, Right Updated: 12/02/22 2018     Vitamin B-12 935 pg/mL     Narrative:      Results may be falsely increased if patient taking Biotin.      Haptoglobin [[id number]]  (Abnormal) Collected: 12/02/22 1518    Specimen: Blood from Arm, Right Updated: 12/02/22 2004     Haptoglobin 277 mg/dL     Ferritin [[id number]]  (Abnormal) Collected: 12/02/22 1518    Specimen: Blood from Arm, Right Updated: 12/02/22 1701     Ferritin 177.70 ng/mL     Narrative:      Results may be falsely decreased if patient taking Biotin.      Iron Profile [[id number]]  (Abnormal) Collected: 12/02/22 1518    Specimen: Blood from Arm, Right Updated: 12/02/22 1700     Iron 49 mcg/dL      Iron Saturation 20 %      Transferrin 167 mg/dL      TIBC 249 mcg/dL     Lactate Dehydrogenase [[id number]]  (Normal) Collected: 12/02/22 1518    Specimen: Blood from Arm, Right Updated: 12/02/22 1700      U/L     Bilirubin, Total & Direct [[id number]] Collected: 12/02/22 1518    Specimen: Blood from Arm, Right Updated: 12/02/22 1700     Total Bilirubin 0.3 mg/dL       Bilirubin, Direct <0.2 mg/dL      Bilirubin, Indirect --     Comment: Unable to calculate       Reticulocytes [[id number]]  (Normal) Collected: 12/02/22 1518    Specimen: Blood from Arm, Right Updated: 12/02/22 1633     Reticulocyte % 0.73 %      Reticulocyte Absolute 0.0230 10*6/mm3     MIR + PE [[id number]] Collected: 12/02/22 1518    Specimen: Blood from Arm, Right Updated: 12/02/22 1623    Phosphorus [[id number]]  (Abnormal) Collected: 12/02/22 0026    Specimen: Blood Updated: 12/02/22 0212     Phosphorus 2.2 mg/dL     Comprehensive Metabolic Panel [[id number]]  (Abnormal) Collected: 12/02/22 0026    Specimen: Blood Updated: 12/02/22 0209     Glucose 100 mg/dL      BUN 14 mg/dL      Creatinine 0.85 mg/dL      Sodium 144 mmol/L      Potassium 4.0 mmol/L      Comment: Slight hemolysis detected by analyzer. Results may be affected.        Chloride 115 mmol/L      CO2 18.0 mmol/L      Calcium 8.3 mg/dL      Total Protein 6.0 g/dL      Albumin 3.00 g/dL      ALT (SGPT) 11 U/L      AST (SGOT) 24 U/L      Alkaline Phosphatase 76 U/L      Total Bilirubin 0.2 mg/dL      Globulin 3.0 gm/dL      A/G Ratio 1.0 g/dL      BUN/Creatinine Ratio 16.5     Anion Gap 11.0 mmol/L      eGFR 76.6 mL/min/1.73      Comment: National Kidney Foundation and American Society of Nephrology (ASN) Task Force recommended calculation based on the Chronic Kidney Disease Epidemiology Collaboration (CKD-EPI) equation refit without adjustment for race.       Narrative:      GFR Normal >60  Chronic Kidney Disease <60  Kidney Failure <15      CBC & Differential [[id number]]  (Abnormal) Collected: 12/02/22 0026    Specimen: Blood Updated: 12/02/22 0205    Narrative:      The following orders were created for panel order CBC & Differential.  Procedure                               Abnormality         Status                     ---------                               -----------         ------                     CBC Auto Differential[[id number]]         Abnormal            Final result                 Please view results for these tests on the individual orders.    CBC Auto Differential [[id number]]  (Abnormal) Collected: 12/02/22 0026    Specimen: Blood Updated: 12/02/22 0205     WBC 5.00 10*3/mm3      RBC 3.16 10*6/mm3      Hemoglobin 9.1 g/dL      Hematocrit 29.0 %      MCV 91.7 fL      MCH 28.8 pg      MCHC 31.4 g/dL      RDW 15.3 %      RDW-SD 52.5 fl      MPV 8.7 fL      Platelets 155 10*3/mm3      Neutrophil % 46.8 %      Lymphocyte % 36.8 %      Monocyte % 11.2 %      Eosinophil % 4.9 %      Basophil % 0.3 %      Neutrophils, Absolute 2.30 10*3/mm3      Lymphocytes, Absolute 1.80 10*3/mm3      Monocytes, Absolute 0.60 10*3/mm3      Eosinophils, Absolute 0.20 10*3/mm3      Basophils, Absolute 0.00 10*3/mm3      nRBC 0.1 /100 WBC     Comprehensive Metabolic Panel [[id number]]  (Abnormal) Collected: 12/01/22 1517    Specimen: Blood from Arm, Right Updated: 12/01/22 1555     Glucose 103 mg/dL      BUN 15 mg/dL      Creatinine 0.96 mg/dL      Sodium 143 mmol/L      Potassium 4.3 mmol/L      Chloride 114 mmol/L      CO2 22.0 mmol/L      Calcium 8.4 mg/dL      Total Protein 6.1 g/dL      Albumin 3.40 g/dL      ALT (SGPT) 9 U/L      AST (SGOT) 17 U/L      Alkaline Phosphatase 70 U/L      Total Bilirubin 0.3 mg/dL      Globulin 2.7 gm/dL      A/G Ratio 1.3 g/dL      BUN/Creatinine Ratio 15.6     Anion Gap 7.0 mmol/L      eGFR 66.2 mL/min/1.73      Comment: National Kidney Foundation and American Society of Nephrology (ASN) Task Force recommended calculation based on the Chronic Kidney Disease Epidemiology Collaboration (CKD-EPI) equation refit without adjustment for race.       Narrative:      GFR Normal >60  Chronic Kidney Disease <60  Kidney Failure <15      CBC & Differential [[id number]]  (Abnormal) Collected: 12/01/22 1517    Specimen: Blood from Arm, Right Updated: 12/01/22 1543    Narrative:      The following orders were created for panel order CBC &  Differential.  Procedure                               Abnormality         Status                     ---------                               -----------         ------                     CBC Auto Differential[[id number]]        Abnormal            Final result                 Please view results for these tests on the individual orders.    CBC Auto Differential [[id number]]  (Abnormal) Collected: 12/01/22 1517    Specimen: Blood from Arm, Right Updated: 12/01/22 1543     WBC 4.30 10*3/mm3      RBC 3.11 10*6/mm3      Hemoglobin 8.8 g/dL      Hematocrit 26.7 %      MCV 85.6 fL      MCH 28.3 pg      MCHC 33.1 g/dL      RDW 14.7 %      RDW-SD 46.4 fl      MPV 8.1 fL      Platelets 151 10*3/mm3      Neutrophil % 42.8 %      Lymphocyte % 40.2 %      Monocyte % 12.6 %      Eosinophil % 4.2 %      Basophil % 0.2 %      Neutrophils, Absolute 1.90 10*3/mm3      Lymphocytes, Absolute 1.70 10*3/mm3      Monocytes, Absolute 0.50 10*3/mm3      Eosinophils, Absolute 0.20 10*3/mm3      Basophils, Absolute 0.00 10*3/mm3      nRBC 0.1 /100 WBC            Pending Results:     Imaging Reviewed:   XR Chest 2 View    Result Date: 11/29/2022  No acute cardiopulmonary process seen. Electronically signed by:  Amy Cancino M.D.  11/28/2022 11:35 PM Mountain [place]    CT Angiogram Chest Pulmonary Embolism    Result Date: 11/29/2022  1. No evidence of pulmonary embolism. 2. No evidence of thoracic aortic aneurysm or dissection. 3. No evidence of pneumonia, pleural or pericardial effusions. Electronically signed by:  Samir Ren D.O.  11/28/2022 11:54 PM Mountain [place]           Assessment & Plan   ASSESSMENT  This is a 62-year-old female presenting with shortness of breath, productive cough, fever over 3 to 4 days found to have influenza A, now with decreasing hemoglobin since admission.  Oncology was consulted for decreasing hemoglobin level.  The patient iron studies are adequate and walk test is normal as well as no seen hemolysis on  lab work.  Oncology will await SPEP and occult stool results for evaluation of underlying disease process or GI blood loss.  If if above tests are negative, oncology will consider bone marrow biopsy to evaluate further.    Anemia: Hgb 9.1  Steady decrease from 12-9 since admission  Iron studies within normal limits  No hemolysis on labs  6-minute walk test shows normal saturation  Await SPEP and occult stool  Consider bone marrow biopsy for possible myeloid disorder if all labs are negative for etiology of anemia    Influenza A+/bronchitis/shortness of breath/cough  proBNP elevated to 4000  CT PE protocol- no evidence of PE  Tamiflu    IRENE likely prerenal azotemia  Creatinine 0.85 yesterday    PLAN  1. Await SPEP  2. Await occult stool  3. Monitor CBC daily  4. Await CBC today  5. Monitor for bleeding  6. Transfuse 1 unit PRBCs for Hgb<7.0    Electronically signed by GUZMAN Marshall, 12/03/22, 1:27 PM EST.    Patient seen and examined agree with assessment and plan    62-year-old female admitted with influenza.  She has worsening anemia normocytic.  Unclear etiology of anemia.  Iron studies borderline normal.  No other dietary causes identified  Occult blood for stool still pending.  SPEP has been sent pending hemolysis unlikely normal haptoglobin. retic is not elevated to the degree of anemia.  This does indicate blunting of bone marrow response.  Hence possibly underlying marrow disorder.  We can consider doing a bone marrow biopsy if no improvement seen.    Thank you for this consult. We will be happy to follow along with you.       Consulting MD below provided more than 50% of the total visit time for this encounter    Electronically signed by Jovon Goodrich MD, 12/03/22, 9:09 PM EST.

## 2022-12-03 NOTE — PROGRESS NOTES
[facility] Medicine Services Daily Progress Note    Patient Name: Andressa Marks  : 1960  MRN: [id number]  Primary Care Physician:  Fadia Beltran  Date of admission: 2022      Subjective      Chief Complaint:  Cough sore throat      Patient Reports     12/3  Patient  Not want to go home yesterday because of concerns of anemia and being weak  Patient had 6-minute walk test that shows normal saturation  Anemia work-up is not revealing at this moment but awaiting further labs  Will consult with hematologist reassess hemoglobin today  We will also check her drug BMP given low bicarb level from yesterday notes reviewed    Review of Systems   All other systems reviewed and are negative.           Objective      Vitals:   Temp:  [98.2 °F (36.8 °C)-99 °F (37.2 °C)] 98.2 °F (36.8 °C)  Heart Rate:  [54-65] 63  Resp:  [14-16] 16  BP: (121-146)/(68-72) 134/71    Physical Exam     GENERAL APPEARANCE: Well developed, well nourished, alert and cooperative, and appears to be in no acute distress.  HEAD: normocephalic.  EYES: PERRL, EOMI. vision intact grossly.  EARS: Intact hearing.  No gross abnormalities.  NOSE: No nasal discharge.  THROAT: Clear   NECK: Neck supple, non-tender without lymphadenopathy, masses or thyromegaly.  CARDIAC: Normal S1 and S2. No S3, S4 or murmurs. Rhythm is regular.  Bradycardia noted there is no peripheral edema, cyanosis or pallor. Extremities are warm and well perfused. Capillary refill is less than 2 seconds. No carotid bruits.  LUNGS: Clear to auscultation and percussion without rales, rhonchi, wheezing or diminished breath sounds.  ABDOMEN: Positive bowel sounds. Soft, nondistended, nontender. No guarding or rebound. No masses.  MUSKULOSKELETAL: No deformity or swelling   BACK: No abnormalities noted     EXTREMITIES: No significant deformity or joint abnormality. No edema. Peripheral pulses intact. No varicosities.  LOWER EXTREMITY: No edema or  swelling  NEUROLOGICAL: CN II-XII intact. Strength and sensation symmetric and intact throughout. Reflexes 2+ throughout. Cerebellar testing normal.  SKIN: Skin normal color, texture and turgor with no lesions or eruptions.  PSYCHIATRIC: Alert cooperative not suicidal          Result Review    Result Review:  I have personally reviewed the results from the time of this admission to 12/3/2022 11:14 EST and agree with these findings:  []  Laboratory  []  Microbiology  []  Radiology  []  EKG/Telemetry   []  Cardiology/Vascular   []  Pathology  []  Old records  []  Other:  Most notable findings include:             Assessment & Plan      Brief Patient Summary:  Andressa Marks is a 62 y.o. female who         atorvastatin, 40 mg, Oral, Nightly  busPIRone, 15 mg, Oral, TID  doxycycline, 100 mg, Oral, Q12H  escitalopram, 20 mg, Oral, Daily  guaiFENesin, 1,200 mg, Oral, Q12H  lisinopril, 20 mg, Oral, Q12H  metoprolol tartrate, 25 mg, Oral, BID  oseltamivir, 75 mg, Oral, Q12H  potassium phosphate (monobasic), 500 mg, Oral, 4x Daily With Meals & Nightly  sodium bicarbonate, 1,300 mg, Oral, BID  sodium chloride, 10 mL, Intravenous, Q12H  topiramate, 50 mg, Oral, BID             Active Hospital Problems:  Active Hospital Problems    Diagnosis    • **IRENE (acute kidney injury) (HCC)    • Influenza A    • Anxiety    • Depressive disorder    • Hyperlipidemia    • Essential hypertension    • Migraine without aura and responsive to treatment      Plan:   History of Present Illness: Andressa Marks is a 62 y.o. female who presented to [facility] on 11/29/2022 complaining of cough and shortness of breath for the past 3 weeks.  Patient states that she was diagnosed with viral pneumonia and acute kidney injury when she was admitted to the hospital.  Patient states her cough is productive with brown sputum and had a temperature as high as 102 at home the last few days.  Patient states her cough got worse over the past 3 to 4  days.  Patient states that she is a never smoker.  Patient states because she has had a worsening sore throat the last few days that she has not wanted to eat or drink much.  Patient also reports some nausea and episode of vomiting yesterday.  Patient states she has had intermittent chest discomfort for the last few weeks as well exertional symptoms along with this.  Patient denies any diarrhea or swelling in her legs bilaterally     In the ED, initial troponin negative, proBNP elevated 4000 which previously was 1000 about 1 month ago.  Serum creatinine 1.74 and BUN of 38 with previous serum creatinine baseline around 1.1.  Initial lactic normal at 0.7.  D-dimer elevated at 1.28.  Coags unremarkable.  CBC largely unremarkable for acute findings.  Influenza A positive.  Blood cultures x2 pending.  Chest x-ray shows no acute findings.  CT PE protocol shows no evidence of pulmonary embolism.  No evidence of thoracic aortic aneurysm or dissection.  No evidence of pneumonia, pleural or pericardial effusions.  EKG shows sinus rhythm 87 bpm, prolonged QT interval, no ST elevation. Last echo done on 1/21/2021 shows EF of 55% and.  LV diastolic dysfunction.  Mild aortic regurgitation.  Patient is afebrile, pulse in the 80s, on room air oxygen at 99% SPO2 and blood pressure 110s/50s.  Patient was given Toradol 1 L of LR in ED.  Patient was also given hycodan for cough.  Spoke with GUZMAN Ndiaye, and case was discussed and accepted patient behalf of Dr. SANDY Holman for admission to hospitalist team.       Assessment/plan    Shortness of breath and cough  -Likely secondary to influenza A bronchitis  -proBNP elevated 4000 which previously was 1000 about 1 month ago.    - Initial lactic normal at 0.7.   - D-dimer elevated at 1.28.    -CBC largely unremarkable for acute findings. Blood cultures x2 pending.      -Chest x-ray shows no acute findings.    -CT PE protocol shows no evidence of pulmonary embolism or acute finding.  -  -Pro-[place]-0.24-- p.o. doxycycline.  -Tessalon Perles and Mucinex ordered  -Continuous cardiac monitoring  -Heart healthy diet  -Tamiflu      IRENE  -Likely prerenal azotemia   -Serum creatinine 1.74 and BUN of 38 with previous serum creatinine baseline around 1.1.   -Continue 100 mL/h normal saline  -Recheck BMP  -Resolved    Anemia  rdered Work-up has been ordered  Reason not clear based on the current lab results  Hematologist consulted    Bradycardia 12/2  Decrease metoprolol to 25 mg yesterday twice daily but still having episodes of bradycardia I will hold off altogether on beta-blocker and watch overnight    Sore throat  -rapid Strep  negative  -Chloraseptic spray ordered     Hyperlipidemia, chronic  -Check lipid panel  -Continue home statin     Essential hypertension, chronic, controlled  -Continue ryann metoprolol  Hold losartan and HCTZ for IRENE    Anxiety/depression, chronic  -Continue home BuSpar, Lexapro, Atarax, Topamax     History of migraines, chronic  -Continue home Imitrex as needed      Metabolic acidosis likely due to normal saline  Bicarb level today pending  On oral bicarb replacement  IV fluids discontinued      DVT prophylaxis:  Mechanical DVT prophylaxis orders are present.    CODE STATUS:    Code Status (Patient has no pulse and is not breathing): CPR (Attempt to Resuscitate)  Medical Interventions (Patient has pulse or is breathing): Full Support      Disposition:  I expect patient to be discharged home.    This patient has been examined wearing appropriate Personal Protective Equipment and discussed with hospital infection control department. 12/03/22      Electronically signed by Xiang Sanabria MD, 12/03/22, 11:14 EST.  [facility] Hospitalist Team

## 2022-12-03 NOTE — PLAN OF CARE
Goal Outcome Evaluation:           Progress: no change  Outcome Evaluation: Patient in bed with call light in reach, able to make needs known.

## 2022-12-04 VITALS
DIASTOLIC BLOOD PRESSURE: 70 MMHG | SYSTOLIC BLOOD PRESSURE: 118 MMHG | HEIGHT: 64 IN | RESPIRATION RATE: 20 BRPM | BODY MASS INDEX: 28.6 KG/M2 | TEMPERATURE: 98.8 F | OXYGEN SATURATION: 95 % | WEIGHT: 167.55 LBS | HEART RATE: 64 BPM

## 2022-12-04 LAB
ALBUMIN SERPL-MCNC: 3.3 G/DL (ref 3.5–5.2)
ANION GAP SERPL CALCULATED.3IONS-SCNC: 9 MMOL/L (ref 5–15)
BACTERIA SPEC AEROBE CULT: NORMAL
BACTERIA SPEC AEROBE CULT: NORMAL
BUN SERPL-MCNC: 14 MG/DL (ref 8–23)
BUN/CREAT SERPL: 14.7 (ref 7–25)
CALCIUM SPEC-SCNC: 8.6 MG/DL (ref 8.6–10.5)
CHLORIDE SERPL-SCNC: 111 MMOL/L (ref 98–107)
CO2 SERPL-SCNC: 23 MMOL/L (ref 22–29)
CREAT SERPL-MCNC: 0.95 MG/DL (ref 0.57–1)
DEPRECATED RDW RBC AUTO: 44.6 FL (ref 37–54)
EGFRCR SERPLBLD CKD-EPI 2021: 67 ML/MIN/1.73
EOSINOPHIL # BLD MANUAL: 0.18 10*3/MM3 (ref 0–0.4)
EOSINOPHIL NFR BLD MANUAL: 3 % (ref 0.3–6.2)
ERYTHROCYTE [DISTWIDTH] IN BLOOD BY AUTOMATED COUNT: 14.6 % (ref 12.3–15.4)
GLUCOSE SERPL-MCNC: 110 MG/DL (ref 65–99)
HCT VFR BLD AUTO: 28.8 % (ref 34–46.6)
HGB BLD-MCNC: 9.5 G/DL (ref 12–15.9)
LARGE PLATELETS: ABNORMAL
LYMPHOCYTES # BLD MANUAL: 0.85 10*3/MM3 (ref 0.7–3.1)
LYMPHOCYTES NFR BLD MANUAL: 10 % (ref 5–12)
MCH RBC QN AUTO: 28.5 PG (ref 26.6–33)
MCHC RBC AUTO-ENTMCNC: 32.8 G/DL (ref 31.5–35.7)
MCV RBC AUTO: 86.8 FL (ref 79–97)
MONOCYTES # BLD: 0.61 10*3/MM3 (ref 0.1–0.9)
MYELOCYTES NFR BLD MANUAL: 2 % (ref 0–0)
NEUTROPHILS # BLD AUTO: 4.33 10*3/MM3 (ref 1.7–7)
NEUTROPHILS NFR BLD MANUAL: 64 % (ref 42.7–76)
NEUTS BAND NFR BLD MANUAL: 7 % (ref 0–5)
PHOSPHATE SERPL-MCNC: 3.2 MG/DL (ref 2.5–4.5)
PLATELET # BLD AUTO: 241 10*3/MM3 (ref 140–450)
PMV BLD AUTO: 8.1 FL (ref 6–12)
POIKILOCYTOSIS BLD QL SMEAR: ABNORMAL
POTASSIUM SERPL-SCNC: 4.2 MMOL/L (ref 3.5–5.2)
RBC # BLD AUTO: 3.32 10*6/MM3 (ref 3.77–5.28)
SCAN SLIDE: NORMAL
SODIUM SERPL-SCNC: 143 MMOL/L (ref 136–145)
TOXIC GRANULATION: ABNORMAL
VARIANT LYMPHS NFR BLD MANUAL: 14 % (ref 19.6–45.3)
WBC NRBC COR # BLD: 6.1 10*3/MM3 (ref 3.4–10.8)

## 2022-12-04 PROCEDURE — G0378 HOSPITAL OBSERVATION PER HR: HCPCS

## 2022-12-04 PROCEDURE — 85025 COMPLETE CBC W/AUTO DIFF WBC: CPT | Performed by: NURSE PRACTITIONER

## 2022-12-04 PROCEDURE — 99214 OFFICE O/P EST MOD 30 MIN: CPT | Performed by: INTERNAL MEDICINE

## 2022-12-04 PROCEDURE — 80069 RENAL FUNCTION PANEL: CPT | Performed by: HOSPITALIST

## 2022-12-04 PROCEDURE — 85007 BL SMEAR W/DIFF WBC COUNT: CPT | Performed by: NURSE PRACTITIONER

## 2022-12-04 RX ORDER — LISINOPRIL 20 MG/1
20 TABLET ORAL DAILY
Start: 2022-12-04

## 2022-12-04 RX ORDER — OSELTAMIVIR PHOSPHATE 75 MG/1
75 CAPSULE ORAL EVERY 12 HOURS SCHEDULED
Qty: 2 CAPSULE | Refills: 0 | Status: SHIPPED | OUTPATIENT
Start: 2022-12-04 | End: 2022-12-05

## 2022-12-04 RX ADMIN — ESCITALOPRAM OXALATE 20 MG: 10 TABLET ORAL at 09:08

## 2022-12-04 RX ADMIN — BUSPIRONE HYDROCHLORIDE 15 MG: 15 TABLET ORAL at 09:08

## 2022-12-04 RX ADMIN — POTASSIUM PHOSPHATE, MONOBASIC 500 MG: 500 TABLET, SOLUBLE ORAL at 09:08

## 2022-12-04 RX ADMIN — TOPIRAMATE 50 MG: 25 TABLET, FILM COATED ORAL at 09:08

## 2022-12-04 RX ADMIN — ACETAMINOPHEN 650 MG: 325 TABLET, FILM COATED ORAL at 11:08

## 2022-12-04 RX ADMIN — POTASSIUM PHOSPHATE, MONOBASIC 500 MG: 500 TABLET, SOLUBLE ORAL at 11:08

## 2022-12-04 RX ADMIN — SODIUM BICARBONATE 650 MG TABLET 1300 MG: at 09:08

## 2022-12-04 RX ADMIN — Medication 10 ML: at 09:09

## 2022-12-04 RX ADMIN — LISINOPRIL 20 MG: 20 TABLET ORAL at 09:08

## 2022-12-04 RX ADMIN — BENZONATATE 200 MG: 100 CAPSULE ORAL at 11:08

## 2022-12-04 RX ADMIN — DOXYCYCLINE 100 MG: 100 TABLET ORAL at 09:08

## 2022-12-04 RX ADMIN — OSELTAMIVIR PHOSPHATE 75 MG: 75 CAPSULE ORAL at 09:08

## 2022-12-04 NOTE — PROGRESS NOTES
[facility] Medicine Services Daily Progress Note    Patient Name: Andressa Marks  : 1960  MRN: [id number]  Primary Care Physician:  Fadia Beltran  Date of admission: 2022      Subjective      Chief Complaint:  Cough sore throat      Patient Reports         Doing much better  She feels that she is ready to go  Her bicarb level improved  Her hemoglobin stable  Seen by hematologist  Heart rate is better off of beta-blockers  I discussed medication management with her  Refer to discharge summary for details 2 days ago  Follow-up on SPEP and other hematologic work-up with PCP and hematologist            Review of Systems   All other systems reviewed and are negative.           Objective      Vitals:   Temp:  [98.1 °F (36.7 °C)-98.8 °F (37.1 °C)] 98.8 °F (37.1 °C)  Heart Rate:  [47-71] 64  Resp:  [18-20] 20  BP: (118-160)/(64-73) 118/70    Physical Exam     GENERAL APPEARANCE: Well developed, well nourished, alert and cooperative, and appears to be in no acute distress.  HEAD: normocephalic.  EYES: PERRL, EOMI. vision intact grossly.  EARS: Intact hearing.  No gross abnormalities.  NOSE: No nasal discharge.  THROAT: Clear   NECK: Neck supple, non-tender without lymphadenopathy, masses or thyromegaly.  CARDIAC: Normal S1 and S2. No S3, S4 or murmurs. Rhythm is regular.  Bradycardia noted there is no peripheral edema, cyanosis or pallor. Extremities are warm and well perfused. Capillary refill is less than 2 seconds. No carotid bruits.  LUNGS: Clear to auscultation and percussion without rales, rhonchi, wheezing or diminished breath sounds.  ABDOMEN: Positive bowel sounds. Soft, nondistended, nontender. No guarding or rebound. No masses.  MUSKULOSKELETAL: No deformity or swelling   BACK: No abnormalities noted     EXTREMITIES: No significant deformity or joint abnormality. No edema. Peripheral pulses intact. No varicosities.  LOWER EXTREMITY: No edema or swelling  NEUROLOGICAL:  CN II-XII intact. Strength and sensation symmetric and intact throughout. Reflexes 2+ throughout. Cerebellar testing normal.  SKIN: Skin normal color, texture and turgor with no lesions or eruptions.  PSYCHIATRIC: Alert cooperative not suicidal          Result Review    Result Review:  I have personally reviewed the results from the time of this admission to 12/4/2022 09:27 EST and agree with these findings:  []  Laboratory  []  Microbiology  []  Radiology  []  EKG/Telemetry   []  Cardiology/Vascular   []  Pathology  []  Old records  []  Other:  Most notable findings include:             Assessment & Plan      Brief Patient Summary:  Andressa Marks is a 62 y.o. female who         atorvastatin, 40 mg, Oral, Nightly  busPIRone, 15 mg, Oral, TID  doxycycline, 100 mg, Oral, Q12H  escitalopram, 20 mg, Oral, Daily  guaiFENesin, 1,200 mg, Oral, Q12H  lisinopril, 20 mg, Oral, Q12H  oseltamivir, 75 mg, Oral, Q12H  potassium phosphate (monobasic), 500 mg, Oral, 4x Daily With Meals & Nightly  sodium bicarbonate, 1,300 mg, Oral, BID  sodium chloride, 10 mL, Intravenous, Q12H  topiramate, 50 mg, Oral, BID             Active Hospital Problems:  Active Hospital Problems    Diagnosis    • **IRENE (acute kidney injury) (HCC)    • Influenza A    • Anxiety    • Depressive disorder    • Hyperlipidemia    • Essential hypertension    • Migraine without aura and responsive to treatment      Plan:   History of Present Illness: Andressa Marks is a 62 y.o. female who presented to [facility] on 11/29/2022 complaining of cough and shortness of breath for the past 3 weeks.  Patient states that she was diagnosed with viral pneumonia and acute kidney injury when she was admitted to the hospital.  Patient states her cough is productive with brown sputum and had a temperature as high as 102 at home the last few days.  Patient states her cough got worse over the past 3 to 4 days.  Patient states that she is a never smoker.  Patient  states because she has had a worsening sore throat the last few days that she has not wanted to eat or drink much.  Patient also reports some nausea and episode of vomiting yesterday.  Patient states she has had intermittent chest discomfort for the last few weeks as well exertional symptoms along with this.  Patient denies any diarrhea or swelling in her legs bilaterally     In the ED, initial troponin negative, proBNP elevated 4000 which previously was 1000 about 1 month ago.  Serum creatinine 1.74 and BUN of 38 with previous serum creatinine baseline around 1.1.  Initial lactic normal at 0.7.  D-dimer elevated at 1.28.  Coags unremarkable.  CBC largely unremarkable for acute findings.  Influenza A positive.  Blood cultures x2 pending.  Chest x-ray shows no acute findings.  CT PE protocol shows no evidence of pulmonary embolism.  No evidence of thoracic aortic aneurysm or dissection.  No evidence of pneumonia, pleural or pericardial effusions.  EKG shows sinus rhythm 87 bpm, prolonged QT interval, no ST elevation. Last echo done on 1/21/2021 shows EF of 55% and.  LV diastolic dysfunction.  Mild aortic regurgitation.  Patient is afebrile, pulse in the 80s, on room air oxygen at 99% SPO2 and blood pressure 110s/50s.  Patient was given Toradol 1 L of LR in ED.  Patient was also given hycodan for cough.  Spoke with GUZMAN Ndiaye, and case was discussed and accepted patient behalf of Dr. SANDY Holman for admission to hospitalist team.       Assessment/plan    Shortness of breath and cough  -Likely secondary to influenza A bronchitis  -proBNP elevated 4000 which previously was 1000 about 1 month ago.    - Initial lactic normal at 0.7.   - D-dimer elevated at 1.28.    -CBC largely unremarkable for acute findings. Blood cultures x2 pending.      -Chest x-ray shows no acute findings.    -CT PE protocol shows no evidence of pulmonary embolism or acute finding.  - -Pro-Honorio-0.24-- p.o. doxycycline.  -Tessalon Perles and Mucinex  ordered  -Continuous cardiac monitoring  -Heart healthy diet  -Tamiflu      IRENE  -Likely prerenal azotemia   -Serum creatinine 1.74 and BUN of 38 with previous serum creatinine baseline around 1.1.   -Continue 100 mL/h normal saline  -Recheck BMP  -Resolved    Anemia  rdered Work-up has been ordered  Reason not clear based on the current lab results  Hematologist consulted    Bradycardia 12/2  Decrease metoprolol to 25 mg yesterday twice daily but still having episodes of bradycardia I will hold off altogether on beta-blocker and watch overnight    Sore throat  -rapid Strep  negative  -Chloraseptic spray ordered     Hyperlipidemia, chronic  -Check lipid panel  -Continue home statin     Essential hypertension, chronic, controlled  -Continue ryann metoprolol  Hold losartan and HCTZ for IRENE    Anxiety/depression, chronic  -Continue home BuSpar, Lexapro, Atarax, Topamax     History of migraines, chronic  -Continue home Imitrex as needed      Metabolic acidosis  Improved  Discontinue oral bicarb  May discharge today        DVT prophylaxis:  Mechanical DVT prophylaxis orders are present.    CODE STATUS:    Code Status (Patient has no pulse and is not breathing): CPR (Attempt to Resuscitate)  Medical Interventions (Patient has pulse or is breathing): Full Support      Disposition:  I expect patient to be discharged home.    This patient has been examined wearing appropriate Personal Protective Equipment and discussed with hospital infection control department. 12/04/22      Electronically signed by Xiang Sanabria MD, 12/04/22, 09:27 EST.  [facility] Hospitalist Team

## 2022-12-04 NOTE — PROGRESS NOTES
Hematology/Oncology Inpatient Progress Note    PATIENT NAME: Andressa Marks  : 1960  MRN: [id number]    CHIEF COMPLAINT: Cough    HISTORY OF PRESENT ILLNESS:    Andressa Marks is a 62 y.o. female who presented to [facility] on 2022 with complaints of productive cough, fever and shortness of breath for the past 3 weeks with history of viral pneumonia diagnosis and acute kidney injury.  Patient was found to be influenza A+ and have IRENE.  She was admitted for further management    CT chest 2022- no evidence of pulmonary embolism, no pneumonia     22  Hematology/Oncology was consulted for unexplained anemia with a drop in hemoglobin from 12-9.  Patient was found to have good iron stores at 49 and saturation at 20.  Hemolysis labs were negative.  SPEP is pending.  Oncology awating SPEP to rule out underlying condition, and occult stool to rule out GI bleed. Will transfuse as needed for Hgb<7.0.  If all labs are negative oncology may consider bone marrow biopsy.     She  has a past medical history of Atrial fibrillation (HCC), Atrial fibrillation (HCC), Depression, Hypertension, and Migraine.     PCP: Fadia Beltran    History of present illness was reviewed and is unchanged from the previous visit. 22    Subjective     ROS:  Review of Systems   Constitutional: Positive for fatigue and unexpected weight change. Negative for fever.   HENT: Negative for congestion and nosebleeds.    Eyes: Negative for pain.   Respiratory: Positive for cough and shortness of breath.    Cardiovascular: Negative for chest pain.   Gastrointestinal: Negative for abdominal pain, blood in stool, diarrhea, nausea and vomiting.   Endocrine: Negative for cold intolerance and heat intolerance.   Genitourinary: Negative for difficulty urinating.   Musculoskeletal: Negative for arthralgias.   Skin: Negative for rash.   Neurological: Negative for dizziness and headaches.   Hematological: Does not  "bruise/bleed easily.   Psychiatric/Behavioral: Negative for behavioral problems.        MEDICATIONS:    Scheduled Meds:  atorvastatin, 40 mg, Oral, Nightly  busPIRone, 15 mg, Oral, TID  doxycycline, 100 mg, Oral, Q12H  escitalopram, 20 mg, Oral, Daily  guaiFENesin, 1,200 mg, Oral, Q12H  lisinopril, 20 mg, Oral, Q12H  oseltamivir, 75 mg, Oral, Q12H  potassium phosphate (monobasic), 500 mg, Oral, 4x Daily With Meals & Nightly  sodium bicarbonate, 1,300 mg, Oral, BID  sodium chloride, 10 mL, Intravenous, Q12H  topiramate, 50 mg, Oral, BID       Continuous Infusions:      PRN Meds:  •  acetaminophen **OR** acetaminophen **OR** acetaminophen  •  aluminum-magnesium hydroxide-simethicone  •  benzocaine-menthol  •  benzonatate  •  HYDROcodone Bit-Homatrop MBr  •  hydrOXYzine  •  ipratropium-albuterol  •  magnesium sulfate **OR** magnesium sulfate **OR** magnesium sulfate  •  melatonin  •  nitroglycerin  •  ondansetron **OR** ondansetron  •  phenol  •  potassium chloride **OR** potassium chloride **OR** potassium chloride  •  sodium chloride  •  sodium chloride  •  sodium chloride  •  SUMAtriptan  •  traZODone     ALLERGIES:    Allergies   Allergen Reactions   • Penicillins Shortness Of Breath       Objective    VITALS:   /70 (BP Location: Right arm, Patient Position: Lying)   Pulse 64   Temp 98.8 °F (37.1 °C) (Oral)   Resp 20   Ht 162.6 cm (64\")   Wt 76 kg (167 lb 8.8 oz)   SpO2 95%   BMI 28.76 kg/m²     PHYSICAL EXAM: (performed by MD)  Physical Exam  Constitutional:       Appearance: Normal appearance.   HENT:      Head: Normocephalic and atraumatic.   Eyes:      Pupils: Pupils are equal, round, and reactive to light.   Cardiovascular:      Rate and Rhythm: Normal rate and regular rhythm.      Pulses: Normal pulses.      Heart sounds: No murmur heard.  Pulmonary:      Effort: Pulmonary effort is normal.      Breath sounds: Normal breath sounds.   Abdominal:      General: There is no distension.      " Palpations: Abdomen is soft. There is no mass.      Tenderness: There is no abdominal tenderness.   Musculoskeletal:         General: Normal range of motion.      Cervical back: Normal range of motion.   Skin:     General: Skin is warm.   Neurological:      General: No focal deficit present.      Mental Status: She is alert.   Psychiatric:         Mood and Affect: Mood normal.           RECENT LABS:  Lab Results (last 24 hours)     Procedure Component Value Units Date/Time    Manual Differential [[id number]]  (Abnormal) Collected: 12/04/22 0839    Specimen: Blood Updated: 12/04/22 0931     Neutrophil % 64.0 %      Lymphocyte % 14.0 %      Monocyte % 10.0 %      Eosinophil % 3.0 %      Bands %  7.0 %      Myelocyte % 2.0 %      Neutrophils Absolute 4.33 10*3/mm3      Lymphocytes Absolute 0.85 10*3/mm3      Monocytes Absolute 0.61 10*3/mm3      Eosinophils Absolute 0.18 10*3/mm3      Poikilocytes Slight/1+     Toxic Granulation Slight/1+     Large Platelets Slight/1+    CBC & Differential [[id number]]  (Abnormal) Collected: 12/04/22 0839    Specimen: Blood Updated: 12/04/22 0931    Narrative:      The following orders were created for panel order CBC & Differential.  Procedure                               Abnormality         Status                     ---------                               -----------         ------                     CBC Auto Differential[[id number]]        Abnormal            Final result               Scan Slide[[id number]]                                       Final result                 Please view results for these tests on the individual orders.    Scan Slide [[id number]] Collected: 12/04/22 0839    Specimen: Blood Updated: 12/04/22 0931     Scan Slide --     Comment: See Manual Differential Results       CBC Auto Differential [[id number]]  (Abnormal) Collected: 12/04/22 0839    Specimen: Blood Updated: 12/04/22 0931     WBC 6.10 10*3/mm3      RBC 3.32 10*6/mm3      Hemoglobin 9.5 g/dL       Hematocrit 28.8 %      MCV 86.8 fL      MCH 28.5 pg      MCHC 32.8 g/dL      RDW 14.6 %      RDW-SD 44.6 fl      MPV 8.1 fL      Platelets 241 10*3/mm3     Narrative:      Modified report. Previous result was Hemogram on 12/4/2022 at 0859 EST.  The previously reported component NRBC is no longer being reported. Previous result was 0.2 /100 WBC (Reference Range: 0.0-0.2 /100 WBC) on 12/4/2022 at 0859 EST.    Renal Function Panel [[id number]]  (Abnormal) Collected: 12/04/22 0820    Specimen: Blood Updated: 12/04/22 0915     Glucose 110 mg/dL      BUN 14 mg/dL      Creatinine 0.95 mg/dL      Sodium 143 mmol/L      Potassium 4.2 mmol/L      Chloride 111 mmol/L      CO2 23.0 mmol/L      Calcium 8.6 mg/dL      Albumin 3.30 g/dL      Phosphorus 3.2 mg/dL      Anion Gap 9.0 mmol/L      BUN/Creatinine Ratio 14.7     eGFR 67.0 mL/min/1.73      Comment: National Kidney Foundation and American Society of Nephrology (ASN) Task Force recommended calculation based on the Chronic Kidney Disease Epidemiology Collaboration (CKD-EPI) equation refit without adjustment for race.       Narrative:      GFR Normal >60  Chronic Kidney Disease <60  Kidney Failure <15      Blood Culture - Blood, Arm, Right [[id number]]  (Normal) Collected: 11/28/22 2357    Specimen: Blood from Arm, Right Updated: 12/04/22 0015     Blood Culture No growth at 5 days    Blood Culture - Blood, Arm, Left [[id number]]  (Normal) Collected: 11/28/22 2357    Specimen: Blood from Arm, Left Updated: 12/04/22 0015     Blood Culture No growth at 5 days    Phosphorus [[id number]]  (Normal) Collected: 12/03/22 1414    Specimen: Blood from Arm, Right Updated: 12/03/22 1508     Phosphorus 3.0 mg/dL      Comment: Result checked         Basic Metabolic Panel [[id number]]  (Abnormal) Collected: 12/03/22 1414    Specimen: Blood from Arm, Right Updated: 12/03/22 1458     Glucose 107 mg/dL      BUN 15 mg/dL      Creatinine 1.21 mg/dL      Sodium 145 mmol/L      Potassium 4.5 mmol/L       Chloride 112 mmol/L      CO2 24.0 mmol/L      Calcium 8.5 mg/dL      BUN/Creatinine Ratio 12.4     Anion Gap 9.0 mmol/L      eGFR 50.2 mL/min/1.73      Comment: National Kidney Foundation and American Society of Nephrology (ASN) Task Force recommended calculation based on the Chronic Kidney Disease Epidemiology Collaboration (CKD-EPI) equation refit without adjustment for race.       Narrative:      GFR Normal >60  Chronic Kidney Disease <60  Kidney Failure <15      CBC & Differential [[id number]]  (Abnormal) Collected: 12/03/22 1414    Specimen: Blood Updated: 12/03/22 1438    Narrative:      The following orders were created for panel order CBC & Differential.  Procedure                               Abnormality         Status                     ---------                               -----------         ------                     CBC Auto Differential[[id number]]        Abnormal            Final result                 Please view results for these tests on the individual orders.    CBC Auto Differential [[id number]]  (Abnormal) Collected: 12/03/22 1414    Specimen: Blood Updated: 12/03/22 1438     WBC 6.10 10*3/mm3      RBC 3.11 10*6/mm3      Hemoglobin 9.0 g/dL      Hematocrit 26.5 %      MCV 85.3 fL      Comment: Result checked          MCH 28.8 pg      MCHC 33.8 g/dL      RDW 14.4 %      RDW-SD 44.6 fl      MPV 8.4 fL      Platelets 210 10*3/mm3      Neutrophil % 55.2 %      Lymphocyte % 29.3 %      Monocyte % 11.4 %      Eosinophil % 3.8 %      Basophil % 0.3 %      Neutrophils, Absolute 3.40 10*3/mm3      Lymphocytes, Absolute 1.80 10*3/mm3      Monocytes, Absolute 0.70 10*3/mm3      Eosinophils, Absolute 0.20 10*3/mm3      Basophils, Absolute 0.00 10*3/mm3      nRBC 0.0 /100 WBC           PENDING RESULTS:     IMAGING REVIEWED:  No radiology results for the last day    Assessment & Plan   ASSESSMENT:  A 62-year-old female admitted with influenza and worsening anemia, normocytic.  The etiology of anemia is  unclear, and iron studies borderline normal, and no other dietary causes identified.  The occult stool is still pending as well as SPEP.  Hemolysis is unlikely with normal haptoglobin but retake is not elevated to the degree of anemia which is concerning for blunting of bone marrow response.  She may possibly have underlying bone marrow disorder.  We will consider bone marrow biopsy if no improvement is seen.    Anemia: Hgb 9.5  Patient had steady decrease from Hgb 12-9 since her admission  Iron studies within normal limits  No hemolysis on labs  6-minute walk test shows normal saturation  Reticulocyte site count is normal which is concerning for blunting of bone marrow  Possibly consider bone marrow biopsy if no improvement  Await SPEP, occult stool    Influenza A positive/bronchitis/shortness of breath/cough  ProBNP elevated to 4000 on admission  CT PE protocol-no evidence of PE  Receiving Tamiflu    IRENE-likely prerenal azotemia  Creatinine 0.95 today      PLAN:  1. Await SPEP, occult stool  2. Monitor CBC daily  3. Monitor for bleeding  4. Transfuse 1 unit PRBCs for Hgb<7.0      Electronically signed by GUZMAN Marshall, 12/04/22, 10:50 AM EST.    Patient seen and examined agree with assessment plan    62-year-old female with acute on chronic anemia.  Patient admitted with influenza.  Hemoglobin has been dropping during hospital admission.  Today however stable to improved at 9.5  Iron studies are normal, occult stool pending.  SPEP was done which is pending as well  Reticulocyte count not elevated based on anemia indicating blunted marrow response.  This could be due to primary marrow disorder.  I will repeat CBC in 2 weeks on follow-up.  Consider bone marrow biopsy if no provement       Consulting MD below provided more than 50% of the total visit time for this encounter    Electronically signed by Jovon Goodrich MD, 12/04/22, 9:22 PM EST.

## 2022-12-05 LAB
ALBUMIN SERPL ELPH-MCNC: 2.8 G/DL (ref 2.9–4.4)
ALBUMIN/GLOB SERPL: 1 {RATIO} (ref 0.7–1.7)
ALPHA1 GLOB SERPL ELPH-MCNC: 0.3 G/DL (ref 0–0.4)
ALPHA2 GLOB SERPL ELPH-MCNC: 0.8 G/DL (ref 0.4–1)
B-GLOBULIN SERPL ELPH-MCNC: 0.8 G/DL (ref 0.7–1.3)
GAMMA GLOB SERPL ELPH-MCNC: 1 G/DL (ref 0.4–1.8)
GLOBULIN SER-MCNC: 2.9 G/DL (ref 2.2–3.9)
IGA SERPL-MCNC: 258 MG/DL (ref 87–352)
IGG SERPL-MCNC: 951 MG/DL (ref 586–1602)
IGM SERPL-MCNC: 87 MG/DL (ref 26–217)
INTERPRETATION SERPL IEP-IMP: ABNORMAL
LABORATORY COMMENT REPORT: ABNORMAL
M PROTEIN SERPL ELPH-MCNC: ABNORMAL G/DL
PROT SERPL-MCNC: 5.7 G/DL (ref 6–8.5)
QT INTERVAL: 434 MS

## 2022-12-05 NOTE — CASE MANAGEMENT/SOCIAL WORK
Case Management Discharge Note      Final Note: Home    Provided Post Acute Provider List?: N/A  N/A Provider List Comment: Anticipate home    Selected Continued Care - Discharged on 12/4/2022 Admission date: 11/29/2022 - Discharge disposition: Home or Self Care            Transportation Services  Private: Car    Final Discharge Disposition Code: 01 - home or self-care